# Patient Record
Sex: FEMALE | Race: WHITE | Employment: PART TIME | URBAN - NONMETROPOLITAN AREA
[De-identification: names, ages, dates, MRNs, and addresses within clinical notes are randomized per-mention and may not be internally consistent; named-entity substitution may affect disease eponyms.]

---

## 2017-01-24 DIAGNOSIS — J31.0 CHRONIC RHINITIS: ICD-10-CM

## 2017-01-24 DIAGNOSIS — J45.20 INTERMITTENT ASTHMA, UNCOMPLICATED: Primary | ICD-10-CM

## 2017-01-24 NOTE — TELEPHONE ENCOUNTER
Ventolin Inhaler       Last Written Prescription Date: 8/31/2016  Last Fill Quantity: 3 Inhaler, # refills: 0    Last Office Visit with G, P or [facility] prescribing provider:  10/26/2016   Future Office Visit:       Date of Last Asthma Action Plan Letter:   Asthma Action Plan Q1 Year    Topic Date Due     Asthma Action Plan - yearly  09/03/2016      Asthma Control Test:   ACT Total Scores 11/4/2015   ACT TOTAL SCORE -   ASTHMA ER VISITS -   ASTHMA HOSPITALIZATIONS -   ACT TOTAL SCORE (Goal Greater than or Equal to 20) 20   In the past 12 months, how many times did you visit the emergency room for your asthma without being admitted to the hospital? 0   In the past 12 months, how many times were you hospitalized overnight because of your asthma? 0       Date of Last Spirometry Test:   No results found for this or any previous visit.

## 2017-01-25 RX ORDER — ALBUTEROL SULFATE 90 UG/1
2 AEROSOL, METERED RESPIRATORY (INHALATION) EVERY 6 HOURS PRN
Qty: 3 INHALER | Refills: 3 | Status: SHIPPED | OUTPATIENT
Start: 2017-01-25 | End: 2017-09-13

## 2017-01-25 NOTE — TELEPHONE ENCOUNTER
Routing refill request to provider for review/approval because:  ACT last done on 11/4/2015    Thank you  Fabi DOUGLAS RN

## 2017-02-15 DIAGNOSIS — Z87.39 HISTORY OF GOUT: ICD-10-CM

## 2017-02-15 NOTE — TELEPHONE ENCOUNTER
colchicine (COLCRYS) 0.6 MG tablet       Last Written Prescription Date: 12/20/16  Last Fill Quantity: 30, # refills: 0  Last Office Visit with G, P or [facility] prescribing provider:  10/26/16        Uric Acid   Date Value Ref Range Status   12/29/2015 8.2 (H) 2.6 - 6.0 mg/dL Final   ]  Creatinine   Date Value Ref Range Status   11/04/2015 0.89 0.52 - 1.04 mg/dL Final   ]  Lab Results   Component Value Date    WBC 4.7 06/15/2016     Lab Results   Component Value Date    RBC 4.12 06/15/2016     Lab Results   Component Value Date    HGB 14.6 06/15/2016     Lab Results   Component Value Date    HCT 43.4 06/15/2016     No components found for: MCT  Lab Results   Component Value Date     06/15/2016     Lab Results   Component Value Date    MCH 35.4 06/15/2016     Lab Results   Component Value Date    MCHC 33.6 06/15/2016     Lab Results   Component Value Date    RDW 11.6 06/15/2016     Lab Results   Component Value Date     06/15/2016     Lab Results   Component Value Date    AST 30 12/08/2012     Lab Results   Component Value Date    ALT 36 12/08/2012

## 2017-02-16 DIAGNOSIS — J45.20 INTERMITTENT ASTHMA: Primary | ICD-10-CM

## 2017-02-16 RX ORDER — COLCHICINE 0.6 MG/1
TABLET ORAL
Qty: 30 TABLET | Refills: 0 | Status: SHIPPED
Start: 2017-02-16 | End: 2018-02-16

## 2017-02-16 RX ORDER — FLUTICASONE PROPIONATE 220 UG/1
2 AEROSOL, METERED RESPIRATORY (INHALATION) 2 TIMES DAILY
Qty: 1 INHALER | Refills: 1 | Status: SHIPPED | OUTPATIENT
Start: 2017-02-16 | End: 2018-02-02

## 2017-02-16 NOTE — TELEPHONE ENCOUNTER
Flovent       Last Written Prescription Date: 4/22/2016  Last Fill Quantity: 1, # refills: 1    Last Office Visit with G, [facility] or [facility] prescribing provider:  10/17/2016   Future Office Visit:       Date of Last Asthma Action Plan Letter:   Asthma Action Plan Q1 Year    Topic Date Due     Asthma Action Plan - yearly  09/03/2016      Asthma Control Test:   ACT Total Scores 11/4/2015   ACT TOTAL SCORE -   ASTHMA ER VISITS -   ASTHMA HOSPITALIZATIONS -   ACT TOTAL SCORE (Goal Greater than or Equal to 20) 20   In the past 12 months, how many times did you visit the emergency room for your asthma without being admitted to the hospital? 0   In the past 12 months, how many times were you hospitalized overnight because of your asthma? 0       Date of Last Spirometry Test:   No results found for this or any previous visit.

## 2017-03-24 DIAGNOSIS — J45.20 INTERMITTENT ASTHMA, UNCOMPLICATED: ICD-10-CM

## 2017-03-24 DIAGNOSIS — M1A.09X0 CHRONIC GOUT OF MULTIPLE SITES, UNSPECIFIED CAUSE: ICD-10-CM

## 2017-03-24 DIAGNOSIS — J31.0 CHRONIC RHINITIS: ICD-10-CM

## 2017-03-24 RX ORDER — MONTELUKAST SODIUM 10 MG/1
10 TABLET ORAL AT BEDTIME
Qty: 90 TABLET | Refills: 1 | Status: SHIPPED | OUTPATIENT
Start: 2017-03-24 | End: 2017-09-20

## 2017-03-24 RX ORDER — ALLOPURINOL 100 MG/1
100 TABLET ORAL DAILY
Qty: 90 TABLET | Refills: 0 | Status: SHIPPED | OUTPATIENT
Start: 2017-03-24 | End: 2017-06-02

## 2017-03-24 NOTE — TELEPHONE ENCOUNTER
Allopurinol 100mg  Last Written Prescription Date: 12.20.2016  Last Fill Quantity: 90, # refills: 0  Last Office Visit with [facility] [place], P or [facility] prescribing provider:  10.26.2016        Uric Acid   Date Value Ref Range Status   12/29/2015 8.2 (H) 2.6 - 6.0 mg/dL Final   ]  Creatinine   Date Value Ref Range Status   11/04/2015 0.89 0.52 - 1.04 mg/dL Final   ]  Lab Results   Component Value Date    WBC 4.7 06/15/2016     Lab Results   Component Value Date    RBC 4.12 06/15/2016     Lab Results   Component Value Date    HGB 14.6 06/15/2016     Lab Results   Component Value Date    HCT 43.4 06/15/2016     No components found for: MCT  Lab Results   Component Value Date     06/15/2016     Lab Results   Component Value Date    MCH 35.4 06/15/2016     Lab Results   Component Value Date    MCHC 33.6 06/15/2016     Lab Results   Component Value Date    RDW 11.6 06/15/2016     Lab Results   Component Value Date     06/15/2016     Lab Results   Component Value Date    AST 30 12/08/2012     Lab Results   Component Value Date    ALT 36 12/08/2012

## 2017-03-24 NOTE — TELEPHONE ENCOUNTER
Singulair 10mg  Last Written Prescription Date: 12.20.2016  Last Fill Quantity: 90,  # refills: 0   Last Office Visit with G, P or [facility] prescribing provider: 10.26.2016    Lisa Biggs CSS

## 2017-05-05 ENCOUNTER — OFFICE VISIT (OUTPATIENT)
Dept: FAMILY MEDICINE | Facility: CLINIC | Age: 62
End: 2017-05-05
Payer: COMMERCIAL

## 2017-05-05 VITALS
BODY MASS INDEX: 22.59 KG/M2 | SYSTOLIC BLOOD PRESSURE: 100 MMHG | TEMPERATURE: 98 F | DIASTOLIC BLOOD PRESSURE: 70 MMHG | RESPIRATION RATE: 20 BRPM | WEIGHT: 131.6 LBS | OXYGEN SATURATION: 98 % | HEART RATE: 79 BPM

## 2017-05-05 DIAGNOSIS — Z71.89 CARDIAC RISK COUNSELING: ICD-10-CM

## 2017-05-05 DIAGNOSIS — H69.92 ETD (EUSTACHIAN TUBE DYSFUNCTION), LEFT: Primary | ICD-10-CM

## 2017-05-05 DIAGNOSIS — F43.9 SITUATIONAL STRESS: ICD-10-CM

## 2017-05-05 DIAGNOSIS — Z71.6 ENCOUNTER FOR SMOKING CESSATION COUNSELING: ICD-10-CM

## 2017-05-05 PROCEDURE — 99214 OFFICE O/P EST MOD 30 MIN: CPT | Performed by: NURSE PRACTITIONER

## 2017-05-05 RX ORDER — NICOTINE 21 MG/24HR
1 PATCH, TRANSDERMAL 24 HOURS TRANSDERMAL EVERY 24 HOURS
Qty: 30 PATCH | Refills: 0 | Status: SHIPPED | OUTPATIENT
Start: 2017-05-05 | End: 2020-08-31

## 2017-05-05 RX ORDER — BUPROPION HYDROCHLORIDE 150 MG/1
TABLET, EXTENDED RELEASE ORAL
Qty: 60 TABLET | Refills: 2 | Status: SHIPPED | OUTPATIENT
Start: 2017-05-05 | End: 2017-11-03

## 2017-05-05 ASSESSMENT — PATIENT HEALTH QUESTIONNAIRE - PHQ9: 5. POOR APPETITE OR OVEREATING: NOT AT ALL

## 2017-05-05 ASSESSMENT — ANXIETY QUESTIONNAIRES
5. BEING SO RESTLESS THAT IT IS HARD TO SIT STILL: NOT AT ALL
7. FEELING AFRAID AS IF SOMETHING AWFUL MIGHT HAPPEN: SEVERAL DAYS
6. BECOMING EASILY ANNOYED OR IRRITABLE: NOT AT ALL
GAD7 TOTAL SCORE: 4
2. NOT BEING ABLE TO STOP OR CONTROL WORRYING: SEVERAL DAYS
IF YOU CHECKED OFF ANY PROBLEMS ON THIS QUESTIONNAIRE, HOW DIFFICULT HAVE THESE PROBLEMS MADE IT FOR YOU TO DO YOUR WORK, TAKE CARE OF THINGS AT HOME, OR GET ALONG WITH OTHER PEOPLE: SOMEWHAT DIFFICULT
1. FEELING NERVOUS, ANXIOUS, OR ON EDGE: SEVERAL DAYS
3. WORRYING TOO MUCH ABOUT DIFFERENT THINGS: SEVERAL DAYS

## 2017-05-05 NOTE — PROGRESS NOTES
SUBJECTIVE:                                                    Fabi Rizzo is a 62 year old female who presents to clinic today for the following health issues:      Ear Problem      Duration: Few weeks, worsening last night    Description (location/character/radiation): Left Ear    Intensity:  moderate    Accompanying signs and symptoms: Crackling    History (similar episodes/previous evaluation): yes has had impacted ears before, thinks its the same    Precipitating or alleviating factors: None    Therapies tried and outcome: peroxide        She also questions if she should be doing anything for screening for risk of heart attack  No chest pain  Some mild fatigue and shortness of breath    The 10-year ASCVD risk score (Ann Arborhernandez RODRÍGUEZ Jr, et al., 2013) is: 4.8%    Values used to calculate the score:      Age: 62 years      Sex: Female      Is Non- : No      Diabetic: No      Tobacco smoker: Yes      Systolic Blood Pressure: 100 mmHg      Is BP treated: Yes      HDL Cholesterol: 78 mg/dL      Total Cholesterol: 193 mg/dL    Discussed with her that smoking increase her risk significantly  She is very interested in quitting but not sure how  She has done chantix in the past but she did get severe nightmares from   Has never tried zyban   When mentioned that this also helps depression she states that it may help her mood  She has been struggling as her daughter had a fetal demise at 7 months   She is having a hard time with watching her daughter struggle with this   She denies thoughts of self harm   PHQ-9 SCORE 12/29/2015 1/29/2016 5/5/2017   Total Score - - -   Total Score 6 1 3     LACY-7 SCORE 12/29/2015 1/29/2016 5/5/2017   Total Score - - -   Total Score 5 1 4                 Problem list and histories reviewed & adjusted, as indicated.  Additional history: as documented    Patient Active Problem List   Diagnosis     CARDIOVASCULAR SCREENING; LDL GOAL LESS THAN 130     Benign essential  hypertension     Intermittent asthma     GERD (gastroesophageal reflux disease)     Major depressive disorder, recurrent episode, mild (HCC)     Advanced directives, counseling/discussion     Alcohol use     Chronic low back pain     Spondylolisthesis of lumbar region     Lumbar foraminal stenosis     Chronic rhinitis     Chronic gout of multiple sites, unspecified cause     Tobacco use disorder     Past Surgical History:   Procedure Laterality Date     LAMINECTOMY LUMBAR POSTERIOR MICROSCOPIC ONE LEVEL N/A 11/17/2015    Procedure: LAMINECTOMY LUMBAR POSTERIOR MICROSCOPIC ONE LEVEL;  Surgeon: Stephen Vuong MD;  Location: [facility] OR     SURGICAL HISTORY OF -   09/30/2009    OPEN REDUCTION, INTERNAL FIXATINO OF RIGHT ANKLE (MEDIAL AND LATERL MALLEOLI)       Social History   Substance Use Topics     Smoking status: Current Every Day Smoker     Packs/day: 0.50     Years: 30.00     Types: Cigarettes     Smokeless tobacco: Never Used     Alcohol use Yes     Family History   Problem Relation Age of Onset     C.A.D. Mother      C.A.D. Father      DIABETES Brother      Hypertension Brother      C.A.D. Brother            Reviewed and updated as needed this visit by clinical staff  Tobacco  Allergies  Med Hx  Surg Hx  Fam Hx  Soc Hx      Reviewed and updated as needed this visit by Provider         ROS:  Constitutional, HEENT, cardiovascular, pulmonary, gi and gu systems are negative, except as otherwise noted.    OBJECTIVE:                                                    /70 (BP Location: Right arm, Patient Position: Chair, Cuff Size: Adult Regular)  Pulse 79  Temp 98  F (36.7  C) (Tympanic)  Resp 20  Wt 131 lb 9.6 oz (59.7 kg)  SpO2 98%  BMI 22.59 kg/m2  Body mass index is 22.59 kg/(m^2).  GENERAL APPEARANCE: healthy, alert and no distress  HENT: ear canals and TM's normal and nose and mouth without ulcers or lesions  RESP: lungs clear to auscultation - no rales, rhonchi or wheezes  CV: regular rates  and rhythm, normal S1 S2, no S3 or S4 and no murmur, click or rub  PSYCH: crying    Diagnostic test results:  Diagnostic Test Results:  none      ASSESSMENT/PLAN:                                                          Patient Instructions      try OVER THE COUNTER antihistamine claritin, zyrtec or allegra  flonase spray  If not better or worse ENT     1. ETD (eustachian tube dysfunction), left  - OTOLARYNGOLOGY REFERRAL    2. Encounter for smoking cessation counseling  Will start zyban and nicotine patch     - buPROPion (WELLBUTRIN SR) 150 MG 12 hr tablet; Take 1 tablet once daily and increase to 1 tablet twice daily after 4 to 7 days  Dispense: 60 tablet; Refill: 2  - nicotine (NICODERM CQ) 14 MG/24HR 24 hr patch; Place 1 patch onto the skin every 24 hours  Dispense: 30 patch; Refill: 0    3. Cardiac risk counseling  Reviewed cardiac risk factors  Recommend smoking cessation   Diet and exercise     4. Situational stress  Emotional support provided   Wellbutrin may be helpful   Fluid in the Middle Ear, No Infection (Adult)  Earaches can happen without an infection. This occurs when air and fluid build up behind the eardrum causing a feeling of fullness and discomfort and reduced hearing. This is called otitis media with effusion (OME) or serous otitis media. It means there is fluid in the middle ear. It is not the same as acute otitis media, which is typically from infection.  OME can happen when you have a cold if congestion blocks the passage that drains the middle ear (eustachian tube). It may also occur with nasal allergies or after a bacterial middle ear infection.    The pain/discomfort may come and go. You may hear clicking or popping sounds when you chew or swallow. You may feel that your balance is off. Or you may hear ringing in the ear.  It often takes from several weeks up to 3 months for the fluid to clear on its own. Oral pain relievers and ear drops help if there is pain. Decongestants and  antihistamines sometimes help. Antibiotics don't help since there is no infection. Your doctor may prescribe a nasal spray to help reduce swelling in the nose and eustachian tube. This can allow the ear to drain.  If it doesn't improve after 3 months, surgery may be used to drain the fluid and insert a small tube in the eardrum to allow continued drainage.  Because the middle ear fluid can become infected, it is important to watch for signs of an ear infection which may develop later. These signs include increased ear pain, fever, or drainage from the ear.  Home care  The following guidelines will help you care for yourself at home:    You may use acetaminophen or ibuprofen to control pain, unless another medicine was prescribed. [NOTE: If you have chronic liver or kidney disease or ever had a stomach ulcer or GI bleeding, talk with your doctor before using these medicines.] (Aspirin should never be used in anyone under 18 years of age who is ill with a fever. It may cause severe liver damage.)    While not always helpful, you may use over-the-counter decongestants such as phenylephrine or pseudoephedrine. Don't use nasal spray decongestants more than 3 days. Longer use can make congestion worse. (Prescription nasal sprays from your doctor don't typically have those restrictions.)    Antihistamines may help if you are also having allergy symptoms.    You may use medicines such as guaifenesin to thin mucus and promote drainage.  Follow-up care  Follow up with your doctor or as advised if you are not feeling better after 3 days.  When to seek medical care  Get prompt medical attention if any of the following occur:    Ear pain gets worse or does not start to improve     Fever of 100.4 F (38 C) or higher, or as directed by your health care provider    Fluid or blood draining from the ear    Headache or sinus pain    Stiff neck    Unusual drowsiness or confusion    8111-2711 [employer]. [address], [place] [address]. All rights reserved. This information is not intended as a substitute for professional medical care. Always follow your healthcare professional's instructions.          How to Quit Smoking  Smoking is one of the hardest habits to break. About half of all people who have ever smoked have been able to quit. Most people who still smoke want to quit. Here are some of the best ways to stop smoking.    Keep trying  It takes most smokers about eight tries before they can quit entirely. It s important not to give up.  Go cold turkey  Most former smokers quit cold turkey (all at once). Trying to cut back gradually doesn't seem to work as well, perhaps because it continues the smoking habit. Also, it is possible to inhale more while smoking fewer cigarettes. This results in the same amount of nicotine in your body!  Get support  Support programs can be a big help, especially for heavy smokers. These groups offer lectures, ways to change behavior, and peer support. Here are some ways to find a support program:    Free national quitline: 014-QUIT-NOW (230-553-3231).    [facility] quit-smoking programs.    American Lung Association: (532.769.3739).    American Cancer Society (111-177-6558).  Support at home is important too. Nonsmokers can offer praise and encouragement. If the smoker in your life finds it hard to quit, encourage them to keep trying!  Over-the-counter medicines  Nicotine replacement therapy may make quitting easier. Certain aids, such as the nicotine patch, gum, and lozenges, are available without a prescription. It is best to use these under a doctor s care, though. The skin patch provides a steady supply of nicotine. Nicotine gum and lozenges give temporary bursts of low levels of nicotine. Both methods reduce the craving for cigarettes. Warning: If you have nausea, vomiting, dizziness, weakness, or a fast heartbeat, stop using these products and see your doctor.  Prescription  medicines  After reviewing your smoking patterns and prior attempts to quit, your doctor may offer a prescription medicine such as bupropion, varenicline, a nicotine inhaler, or nasal spray. Each has advantages and side effects. Your doctor can review these with you.  Health benefits of quitting  The benefits of quitting start right away and keep improving the longer you go without smoking. These benefits occur at any age.  So whether you are 17 or 70, quitting is a good decision. Some of the benefits include:    20 minutes: Blood pressure and pulse return to normal.    8 hours: Oxygen levels return to normal.    2 days: Ability to smell and taste begin to improve as damaged nerves regrow.    2 to 3 weeks: Circulation and lung function improve.    1 to 9 months: Coughing, congestion, and shortness of breath decrease; tiredness decreases.    1 year: Risk of heart attack decreases by half.    5 years: Risk of lung cancer decreases by half; risk of stroke becomes the same as a nonsmoker s.  For more on how to quit smoking, try these online resources:     Smokefree.gov http://smokefree.gov/    Clearing the Air  booklet from the National Cancer [place] http://smokefree.gov/sites/default/files/pdf/clearing-the-air-accessible.pdf    3083-6203 [employer]. [address] [place] [address], [place] [id number]. All rights reserved. This information is not intended as a substitute for professional medical care. Always follow your healthcare professional's instructions.            Adrianna Watson NP  [facility]

## 2017-05-05 NOTE — PATIENT INSTRUCTIONS
try OVER THE COUNTER antihistamine claritin, zyrtec or allegra  flonase spray  If not better or worse ENT     1. ETD (eustachian tube dysfunction), left  - OTOLARYNGOLOGY REFERRAL    2. Encounter for smoking cessation counseling  Will start zyban and nicotine patch     - buPROPion (WELLBUTRIN SR) 150 MG 12 hr tablet; Take 1 tablet once daily and increase to 1 tablet twice daily after 4 to 7 days  Dispense: 60 tablet; Refill: 2  - nicotine (NICODERM CQ) 14 MG/24HR 24 hr patch; Place 1 patch onto the skin every 24 hours  Dispense: 30 patch; Refill: 0  Fluid in the Middle Ear, No Infection (Adult)  Earaches can happen without an infection. This occurs when air and fluid build up behind the eardrum causing a feeling of fullness and discomfort and reduced hearing. This is called otitis media with effusion (OME) or serous otitis media. It means there is fluid in the middle ear. It is not the same as acute otitis media, which is typically from infection.  OME can happen when you have a cold if congestion blocks the passage that drains the middle ear (eustachian tube). It may also occur with nasal allergies or after a bacterial middle ear infection.    The pain/discomfort may come and go. You may hear clicking or popping sounds when you chew or swallow. You may feel that your balance is off. Or you may hear ringing in the ear.  It often takes from several weeks up to 3 months for the fluid to clear on its own. Oral pain relievers and ear drops help if there is pain. Decongestants and antihistamines sometimes help. Antibiotics don't help since there is no infection. Your doctor may prescribe a nasal spray to help reduce swelling in the nose and eustachian tube. This can allow the ear to drain.  If it doesn't improve after 3 months, surgery may be used to drain the fluid and insert a small tube in the eardrum to allow continued drainage.  Because the middle ear fluid can become infected, it is important to watch for signs  of an ear infection which may develop later. These signs include increased ear pain, fever, or drainage from the ear.  Home care  The following guidelines will help you care for yourself at home:    You may use acetaminophen or ibuprofen to control pain, unless another medicine was prescribed. [NOTE: If you have chronic liver or kidney disease or ever had a stomach ulcer or GI bleeding, talk with your doctor before using these medicines.] (Aspirin should never be used in anyone under 18 years of age who is ill with a fever. It may cause severe liver damage.)    While not always helpful, you may use over-the-counter decongestants such as phenylephrine or pseudoephedrine. Don't use nasal spray decongestants more than 3 days. Longer use can make congestion worse. (Prescription nasal sprays from your doctor don't typically have those restrictions.)    Antihistamines may help if you are also having allergy symptoms.    You may use medicines such as guaifenesin to thin mucus and promote drainage.  Follow-up care  Follow up with your doctor or as advised if you are not feeling better after 3 days.  When to seek medical care  Get prompt medical attention if any of the following occur:    Ear pain gets worse or does not start to improve     Fever of 100.4 F (38 C) or higher, or as directed by your health care provider    Fluid or blood draining from the ear    Headache or sinus pain    Stiff neck    Unusual drowsiness or confusion    7111-5283 [employer]. [address] [place] [address]. All rights reserved. This information is not intended as a substitute for professional medical care. Always follow your healthcare professional's instructions.          How to Quit Smoking  Smoking is one of the hardest habits to break. About half of all people who have ever smoked have been able to quit. Most people who still smoke want to quit. Here are some of the best ways to stop smoking.    Keep trying  It  takes most smokers about eight tries before they can quit entirely. It s important not to give up.  Go cold turkey  Most former smokers quit cold turkey (all at once). Trying to cut back gradually doesn't seem to work as well, perhaps because it continues the smoking habit. Also, it is possible to inhale more while smoking fewer cigarettes. This results in the same amount of nicotine in your body!  Get support  Support programs can be a big help, especially for heavy smokers. These groups offer lectures, ways to change behavior, and peer support. Here are some ways to find a support program:    Free national quitline: 800-QUIT-NOW (621-211-8955).    Hospital quit-smoking programs.    American Lung Association: (773.964.2054).    American Cancer Society (263-321-0875).  Support at home is important too. Nonsmokers can offer praise and encouragement. If the smoker in your life finds it hard to quit, encourage them to keep trying!  Over-the-counter medicines  Nicotine replacement therapy may make quitting easier. Certain aids, such as the nicotine patch, gum, and lozenges, are available without a prescription. It is best to use these under a doctor s care, though. The skin patch provides a steady supply of nicotine. Nicotine gum and lozenges give temporary bursts of low levels of nicotine. Both methods reduce the craving for cigarettes. Warning: If you have nausea, vomiting, dizziness, weakness, or a fast heartbeat, stop using these products and see your doctor.  Prescription medicines  After reviewing your smoking patterns and prior attempts to quit, your doctor may offer a prescription medicine such as bupropion, varenicline, a nicotine inhaler, or nasal spray. Each has advantages and side effects. Your doctor can review these with you.  Health benefits of quitting  The benefits of quitting start right away and keep improving the longer you go without smoking. These benefits occur at any age.  So whether you are 17  or 70, quitting is a good decision. Some of the benefits include:    20 minutes: Blood pressure and pulse return to normal.    8 hours: Oxygen levels return to normal.    2 days: Ability to smell and taste begin to improve as damaged nerves regrow.    2 to 3 weeks: Circulation and lung function improve.    1 to 9 months: Coughing, congestion, and shortness of breath decrease; tiredness decreases.    1 year: Risk of heart attack decreases by half.    5 years: Risk of lung cancer decreases by half; risk of stroke becomes the same as a nonsmoker s.  For more on how to quit smoking, try these online resources:     Smokefree.gov http://smokefree.gov/    Clearing the Air  booklet from the National Cancer [place] http://smokefree.gov/sites/default/files/pdf/clearing-the-air-accessible.pdf    0663-8943 [employer]. [address] [place] [address], [place] [address]. All rights reserved. This information is not intended as a substitute for professional medical care. Always follow your healthcare professional's instructions.

## 2017-05-05 NOTE — NURSING NOTE
"Chief Complaint   Patient presents with     Ear Problem       Initial /70 (BP Location: Right arm, Patient Position: Chair, Cuff Size: Adult Regular)  Pulse 79  Temp 98  F (36.7  C) (Tympanic)  Resp 20  Wt 131 lb 9.6 oz (59.7 kg)  SpO2 98%  BMI 22.59 kg/m2 Estimated body mass index is 22.59 kg/(m^2) as calculated from the following:    Height as of 10/26/16: 5' 4\" (1.626 m).    Weight as of this encounter: 131 lb 9.6 oz (59.7 kg).  Medication Reconciliation: complete    Health Maintenance that is potentially due pending provider review:  Due for physical    Is aware, will make appointment for later time.      "

## 2017-05-05 NOTE — MR AVS SNAPSHOT
After Visit Summary   5/5/2017    Fabi Rizzo    MRN: [id number]           Patient Information     Date Of Birth          1955        Visit Information        Provider Department      5/5/2017 2:40 PM Adrianna Watson NP [facility]        Today's Diagnoses     ETD (eustachian tube dysfunction), left    -  1    Encounter for smoking cessation counseling          Care Instructions       try OVER THE COUNTER antihistamine claritin, zyrtec or allegra  flonase spray  If not better or worse ENT     1. ETD (eustachian tube dysfunction), left  - OTOLARYNGOLOGY REFERRAL    2. Encounter for smoking cessation counseling  Will start zyban and nicotine patch     - buPROPion (WELLBUTRIN SR) 150 MG 12 hr tablet; Take 1 tablet once daily and increase to 1 tablet twice daily after 4 to 7 days  Dispense: 60 tablet; Refill: 2  - nicotine (NICODERM CQ) 14 MG/24HR 24 hr patch; Place 1 patch onto the skin every 24 hours  Dispense: 30 patch; Refill: 0  Fluid in the Middle Ear, No Infection (Adult)  Earaches can happen without an infection. This occurs when air and fluid build up behind the eardrum causing a feeling of fullness and discomfort and reduced hearing. This is called otitis media with effusion (OME) or serous otitis media. It means there is fluid in the middle ear. It is not the same as acute otitis media, which is typically from infection.  OME can happen when you have a cold if congestion blocks the passage that drains the middle ear (eustachian tube). It may also occur with nasal allergies or after a bacterial middle ear infection.    The pain/discomfort may come and go. You may hear clicking or popping sounds when you chew or swallow. You may feel that your balance is off. Or you may hear ringing in the ear.  It often takes from several weeks up to 3 months for the fluid to clear on its own. Oral pain relievers and ear drops help if there is pain. Decongestants and antihistamines  sometimes help. Antibiotics don't help since there is no infection. Your doctor may prescribe a nasal spray to help reduce swelling in the nose and eustachian tube. This can allow the ear to drain.  If it doesn't improve after 3 months, surgery may be used to drain the fluid and insert a small tube in the eardrum to allow continued drainage.  Because the middle ear fluid can become infected, it is important to watch for signs of an ear infection which may develop later. These signs include increased ear pain, fever, or drainage from the ear.  Home care  The following guidelines will help you care for yourself at home:    You may use acetaminophen or ibuprofen to control pain, unless another medicine was prescribed. [NOTE: If you have chronic liver or kidney disease or ever had a stomach ulcer or GI bleeding, talk with your doctor before using these medicines.] (Aspirin should never be used in anyone under 18 years of age who is ill with a fever. It may cause severe liver damage.)    While not always helpful, you may use over-the-counter decongestants such as phenylephrine or pseudoephedrine. Don't use nasal spray decongestants more than 3 days. Longer use can make congestion worse. (Prescription nasal sprays from your doctor don't typically have those restrictions.)    Antihistamines may help if you are also having allergy symptoms.    You may use medicines such as guaifenesin to thin mucus and promote drainage.  Follow-up care  Follow up with your doctor or as advised if you are not feeling better after 3 days.  When to seek medical care  Get prompt medical attention if any of the following occur:    Ear pain gets worse or does not start to improve     Fever of 100.4 F (38 C) or higher, or as directed by your health care provider    Fluid or blood draining from the ear    Headache or sinus pain    Stiff neck    Unusual drowsiness or confusion    2360-7432 [employer]. [address] [place] [address], [place] [address]. All rights reserved. This information is not intended as a substitute for professional medical care. Always follow your healthcare professional's instructions.          How to Quit Smoking  Smoking is one of the hardest habits to break. About half of all people who have ever smoked have been able to quit. Most people who still smoke want to quit. Here are some of the best ways to stop smoking.    Keep trying  It takes most smokers about eight tries before they can quit entirely. It s important not to give up.  Go cold turkey  Most former smokers quit cold turkey (all at once). Trying to cut back gradually doesn't seem to work as well, perhaps because it continues the smoking habit. Also, it is possible to inhale more while smoking fewer cigarettes. This results in the same amount of nicotine in your body!  Get support  Support programs can be a big help, especially for heavy smokers. These groups offer lectures, ways to change behavior, and peer support. Here are some ways to find a support program:    Free national quitline: 800HealthHiwayQUIT-NOW (699-527-0997).    [facility] quit-smoking programs.    American Lung Association: (359.146.9911).    American Cancer Society (451-815-0862).  Support at home is important too. Nonsmokers can offer praise and encouragement. If the smoker in your life finds it hard to quit, encourage them to keep trying!  Over-the-counter medicines  Nicotine replacement therapy may make quitting easier. Certain aids, such as the nicotine patch, gum, and lozenges, are available without a prescription. It is best to use these under a doctor s care, though. The skin patch provides a steady supply of nicotine. Nicotine gum and lozenges give temporary bursts of low levels of nicotine. Both methods reduce the craving for cigarettes. Warning: If you have nausea, vomiting, dizziness, weakness, or a fast heartbeat, stop using these products and see your doctor.  Prescription medicines  After  reviewing your smoking patterns and prior attempts to quit, your doctor may offer a prescription medicine such as bupropion, varenicline, a nicotine inhaler, or nasal spray. Each has advantages and side effects. Your doctor can review these with you.  Health benefits of quitting  The benefits of quitting start right away and keep improving the longer you go without smoking. These benefits occur at any age.  So whether you are 17 or 70, quitting is a good decision. Some of the benefits include:    20 minutes: Blood pressure and pulse return to normal.    8 hours: Oxygen levels return to normal.    2 days: Ability to smell and taste begin to improve as damaged nerves regrow.    2 to 3 weeks: Circulation and lung function improve.    1 to 9 months: Coughing, congestion, and shortness of breath decrease; tiredness decreases.    1 year: Risk of heart attack decreases by half.    5 years: Risk of lung cancer decreases by half; risk of stroke becomes the same as a nonsmoker s.  For more on how to quit smoking, try these online resources:     Smokefree.gov http://smokefree.gov/    Clearing the Air  booklet from the National Cancer [place] http://smokefree.gov/sites/default/files/pdf/clearing-the-air-accessible.pdf    9441-3959 [employer]. [address] [place] [address], [place] [address]. All rights reserved. This information is not intended as a substitute for professional medical care. Always follow your healthcare professional's instructions.              Follow-ups after your visit        Additional Services     OTOLARYNGOLOGY REFERRAL       Your provider has referred you to: [facility]: [facility] [phone]   [email or web address]    Please be aware that coverage of these services is subject to the terms and limitations of your health insurance plan.  Call member services at your health plan with any benefit or coverage questions.      Please bring the following with you  to your appointment:    (1) Any X-Rays, CTs or MRIs which have been performed.  Contact the facility where they were done to arrange for  prior to your scheduled appointment.   (2) List of current medications  (3) This referral request   (4) Any documents/labs given to you for this referral                  Who to contact     If you have questions or need follow up information about today's clinic visit or your schedule please contact [facility] directly at [phone].  Normal or non-critical lab and imaging results will be communicated to you by [employer], letter or phone within 4 business days after the clinic has received the results. If you do not hear from us within 7 days, please contact the clinic through [employer] or phone. If you have a critical or abnormal lab result, we will notify you by phone as soon as possible.  Submit refill requests through [employer] or call your pharmacy and they will forward the refill request to us. Please allow 3 business days for your refill to be completed.          Additional Information About Your Visit        [employer] Information     [employer] gives you secure access to your electronic health record. If you see a primary care provider, you can also send messages to your care team and make appointments. If you have questions, please call your primary care clinic.  If you do not have a primary care provider, please call [phone] and they will assist you.        Care EveryWhere ID     This is your Care EveryWhere ID. This could be used by other organizations to access your [facility] medical records  [id number]        Your Vitals Were     Pulse Temperature Respirations Pulse Oximetry BMI (Body Mass Index)       79 98  F (36.7  C) (Tympanic) 20 98% 22.59 kg/m2        Blood Pressure from Last 3 Encounters:   05/05/17 100/70   10/26/16 98/64   10/17/16 98/52    Weight from Last 3 Encounters:   05/05/17 131 lb 9.6 oz (59.7 kg)   10/26/16 130 lb (59 kg)    10/17/16 131 lb 3.2 oz (59.5 kg)              We Performed the Following     OTOLARYNGOLOGY REFERRAL          Today's Medication Changes          These changes are accurate as of: 5/5/17  3:14 PM.  If you have any questions, ask your nurse or doctor.               Start taking these medicines.        Dose/Directions    buPROPion 150 MG 12 hr tablet   Commonly known as:  WELLBUTRIN SR   Used for:  Encounter for smoking cessation counseling   Started by:  Adrianna Watson NP        Take 1 tablet once daily and increase to 1 tablet twice daily after 4 to 7 days   Quantity:  60 tablet   Refills:  2       nicotine 14 MG/24HR 24 hr patch   Commonly known as:  NICODERM CQ   Used for:  Encounter for smoking cessation counseling   Started by:  Adrianna Watson NP        Dose:  1 patch   Place 1 patch onto the skin every 24 hours   Quantity:  30 patch   Refills:  0            Where to get your medicines      These medications were sent to [facility] [address]     Phone:  [phone]     buPROPion 150 MG 12 hr tablet    nicotine 14 MG/24HR 24 hr patch                Primary Care Provider Office Phone # Fax #    Vince Vasquez [phone]       [facility] [address] [facility] [address]        Thank you!     Thank you for choosing [facility]  for your care. Our goal is always to provide you with excellent care. Hearing back from our patients is one way we can continue to improve our services. Please take a few minutes to complete the written survey that you may receive in the mail after your visit with us. Thank you!             Your Updated Medication List - Protect others around you: Learn how to safely use, store and throw away your medicines at www.disposemymeds.org.          This list is accurate as of: 5/5/17  3:14 PM.  Always use your most recent med  list.                   Brand Name Dispense Instructions for use    * albuterol (2.5 MG/3ML) 0.083% neb solution     60 vial    Take 3 mLs (2.5 mg) by nebulization every 6 hours as needed for shortness of breath / dyspnea       * albuterol 108 (90 BASE) MCG/ACT Inhaler    PROAIR HFA/PROVENTIL HFA/VENTOLIN HFA    3 Inhaler    Inhale 2 puffs into the lungs every 6 hours as needed for shortness of breath / dyspnea       allopurinol 100 MG tablet    ZYLOPRIM    90 tablet    Take 1 tablet (100 mg) by mouth daily       buPROPion 150 MG 12 hr tablet    WELLBUTRIN SR    60 tablet    Take 1 tablet once daily and increase to 1 tablet twice daily after 4 to 7 days       calcium 600 MG tablet     60 Tab    1 TABLET TWICE DAILY WITH FOOD       colchicine 0.6 MG tablet    COLCRYS    30 tablet    Take 2 tablets at the first sign of flare, take 1 additional tablet one hour later. Then take 1 tablet twice daily until your gout flare is gone       fish oil-omega-3 fatty acids 1000 MG capsule     120 Cap    1 CAPSULE DAILY       fluticasone 220 MCG/ACT Inhaler    FLOVENT HFA    1 Inhaler    Inhale 2 puffs into the lungs 2 times daily       lisinopril 5 MG tablet    PRINIVIL/ZESTRIL    90 tablet    Take 1 tablet (5 mg) by mouth daily ONE DAILY       metoprolol 50 MG tablet    LOPRESSOR    90 tablet    Take 1 tablet (50 mg) by mouth daily       montelukast 10 MG tablet    SINGULAIR    90 tablet    Take 1 tablet (10 mg) by mouth At Bedtime       multivitamin per tablet     100 Tab    ONE DAILY       nicotine 14 MG/24HR 24 hr patch    NICODERM CQ    30 patch    Place 1 patch onto the skin every 24 hours       order for DME     1 Device    Equipment being ordered: Nebulizer       vitamin B complex with vitamin C Tabs tablet      Take 1 tablet by mouth 2 times daily.       vitamin D3 1000 UNITS Caps     30 Cap    1 CAPSULE DAILY       * Notice:  This list has 2 medication(s) that are the same as other medications prescribed for you. Read  the directions carefully, and ask your doctor or other care provider to review them with you.

## 2017-05-06 ASSESSMENT — PATIENT HEALTH QUESTIONNAIRE - PHQ9: SUM OF ALL RESPONSES TO PHQ QUESTIONS 1-9: 3

## 2017-05-06 ASSESSMENT — ASTHMA QUESTIONNAIRES: ACT_TOTALSCORE: 18

## 2017-05-06 ASSESSMENT — ANXIETY QUESTIONNAIRES: GAD7 TOTAL SCORE: 4

## 2017-08-12 ENCOUNTER — TELEPHONE (OUTPATIENT)
Dept: FAMILY MEDICINE | Facility: CLINIC | Age: 62
End: 2017-08-12

## 2017-08-12 NOTE — TELEPHONE ENCOUNTER
PHS (Colon/Mammo) - Declined; Patient is aware of screenings and is wanting to schedule both screenings but is unable to schedule at this time. Patient will call back to schedule screenings.    Outreach ,  Lissette Minaya

## 2017-09-20 DIAGNOSIS — J31.0 CHRONIC RHINITIS: ICD-10-CM

## 2017-09-20 DIAGNOSIS — J45.20 INTERMITTENT ASTHMA, UNCOMPLICATED: ICD-10-CM

## 2017-09-20 RX ORDER — MONTELUKAST SODIUM 10 MG/1
TABLET ORAL
Qty: 90 TABLET | Refills: 1 | Status: SHIPPED | OUTPATIENT
Start: 2017-09-20 | End: 2018-02-16

## 2017-10-17 ENCOUNTER — OFFICE VISIT (OUTPATIENT)
Dept: FAMILY MEDICINE | Facility: CLINIC | Age: 62
End: 2017-10-17
Payer: COMMERCIAL

## 2017-10-17 VITALS
DIASTOLIC BLOOD PRESSURE: 68 MMHG | BODY MASS INDEX: 23.52 KG/M2 | RESPIRATION RATE: 20 BRPM | WEIGHT: 137 LBS | SYSTOLIC BLOOD PRESSURE: 104 MMHG | OXYGEN SATURATION: 98 % | HEART RATE: 72 BPM | TEMPERATURE: 98.1 F

## 2017-10-17 DIAGNOSIS — Z12.11 SPECIAL SCREENING FOR MALIGNANT NEOPLASMS, COLON: ICD-10-CM

## 2017-10-17 DIAGNOSIS — M70.51 BURSITIS OF RIGHT KNEE, UNSPECIFIED BURSA: Primary | ICD-10-CM

## 2017-10-17 DIAGNOSIS — I83.93 VARICOSE VEINS OF BOTH LOWER EXTREMITIES: ICD-10-CM

## 2017-10-17 DIAGNOSIS — I83.891 VARICOSE VEINS OF LEG WITH EDEMA, RIGHT: ICD-10-CM

## 2017-10-17 PROCEDURE — 99214 OFFICE O/P EST MOD 30 MIN: CPT | Performed by: NURSE PRACTITIONER

## 2017-10-17 NOTE — MR AVS SNAPSHOT
After Visit Summary   10/17/2017    Fabi Rizzo    MRN: [id number]           Patient Information     Date Of Birth          1955        Visit Information        Provider Department      10/17/2017 3:00 PM Terri Hill, CNP [facility]        Today's Diagnoses     Bursitis of right knee, unspecified bursa    -  1    Varicose veins of both lower extremities        Varicose veins of leg with edema, right        Special screening for malignant neoplasms, colon          Care Instructions    Schedule Ultrasound in [place]    Follow instructions below for bursitis    Schedule Mammogram    Complete FIT TEST at home and mail in      Bursitis  You have bursitis. This is an inflammation of the bursa. These are small, fluid-filled sacs that surround the larger joints of the body. The bursa help the muscles and tendons move smoothly over the joints.  Bursitis often happens in the shoulder. But it can also affect the elbows, hips, pelvis, knees, toes, and heels. Bursitis can be caused by injury, overuse of the joint, or infection of the bursa. Symptoms include pain and tenderness over a joint. Symptoms get worse with movement.  Bursitis is treated with an anti-inflammatory medicine and by resting the joint. More severe cases require injection of medicine directly into the bursa.    Home care    Rest the painful joint and protect it from movement. This will allow the inflammation to heal faster.    Apply an ice pack over the injured area for no more than 15 to 20 minutes. Do this every 3 to 6 hours for the first 24 to 48 hours. Keep using ice packs 3 to 4 times a day until the pain and swelling improves.     To make an ice pack, put ice cubes in a sealed plastic zip-lock bag. Wrap the bag in a clean, thin towel or cloth. Never put ice or an ice pack directly on the skin. As the ice melts, be careful to avoid getting any wrap or splint wet.    You may take over-the-counter pain  medicine to treat pain and inflammation, unless another medicine was prescribed. Anti-inflammatory pain medicines may be more effective. Talk with your provider beforeusing these medicines if you have chronic liver or kidney disease, or ever had a stomach ulcer or GI (gastrointestinal) bleeding.    As your symptoms improve, slowly begin to move the joint. Do not overuse the joint. This may cause the symptoms to flare up again.  When to seek medical advice  Call your healthcare provider right away if any of these occur:    Redness over the painful area    Increasing pain or swelling at the joint    Fever of 100.4 F (38 C) or above lasting for 24 to 48 hours  Date Last Reviewed: 11/21/2015 2000-2017 [employer]. [address] [place] [address], [place] [address]. All rights reserved. This information is not intended as a substitute for professional medical care. Always follow your healthcare professional's instructions.        Self-Care for Spider and Varicose Veins  Your healthcare provider may suggest that you try self-care. Exercising and maintaining a healthy weight may keep problem veins from getting worse. Wearing elastic stockings and elevating your legs can help improve blood flow. Taking breaks when you sit or stand helps, too.     The top of the elastic stocking should be below the bend in your knee for a proper fit.   Exercising  Exercising is good for your veins because it improves blood flow. Walking, cycling, or swimming are great exercises for vein health. But be sure to check with your healthcare provider before starting any exercise program. Also, keep these hints in mind:    When exercising, start out slowly and try to build up to 30 minutes on most days.    Elevate your legs above heart level after exercise to keep blood from pooling in veins.  Maintaining a healthy weight  Being overweight puts extra pressure on your veins. To maintain a healthy weight, try these tips:    Choose lean meats,  fish, and skinless chicken.    Use low-fat dairy products.    Eat foods high in fiber, such as whole grains, fruits, and vegetables.    Cut down on sugar, salt, and saturated and trans fats.    Exercise regularly.  Wearing elastic stockings  Elastic stockings gently squeeze veins so blood flows upward. If you need elastic stockings, your healthcare provider can prescribe them for you. Follow your healthcare provider s advice about how and when to wear them. Elastic stockings come in several different levels of pressure. Ask your healthcare provider which level of pressure would benefit you the most.   Elevating your legs  Raising your legs above heart level will help relieve swelling and keep blood from pooling in veins. Try to elevate your legs for 15 to 20 minutes at the end of the day, and whenever you re relaxing. To make sure your legs are raised above heart level, prop them up on cushions or large pillows.  When sitting and standing  To keep blood moving when you have to sit or stand for long periods, try these tips:    At work, take walking breaks instead of coffee breaks. Walk during your lunch hour. Or try flexing your feet up and down 10 times each hour.    When standing, raise yourself up and down on your toes, or rock back and forth on your heels.  Date Last Reviewed: 5/1/2016 2000-2017 [employer] [address] [place] [address], [place] [address]. All rights reserved. This information is not intended as a substitute for professional medical care. Always follow your healthcare professional's instructions.                Follow-ups after your visit        Future tests that were ordered for you today     Open Future Orders        Priority Expected Expires Ordered    US Lower Extremity Venous Duplex Bilateral Routine  10/17/2018 10/17/2017    Fecal colorectal cancer screen (FIT) Routine 11/7/2017 1/9/2018 10/17/2017            Who to contact     If you have questions or need follow up information  about today's clinic visit or your schedule please contact [facility] directly at [phone].  Normal or non-critical lab and imaging results will be communicated to you by MyChart, letter or phone within 4 business days after the clinic has received the results. If you do not hear from us within 7 days, please contact the clinic through [employer] or phone. If you have a critical or abnormal lab result, we will notify you by phone as soon as possible.  Submit refill requests through [employer] or call your pharmacy and they will forward the refill request to us. Please allow 3 business days for your refill to be completed.          Additional Information About Your Visit        [employer] Information     [employer] gives you secure access to your electronic health record. If you see a primary care provider, you can also send messages to your care team and make appointments. If you have questions, please call your primary care clinic.  If you do not have a primary care provider, please call [phone] and they will assist you.        Care EveryWhere ID     This is your Care EveryWhere ID. This could be used by other organizations to access your [place] medical records  [id number]        Your Vitals Were     Pulse Temperature Respirations Pulse Oximetry BMI (Body Mass Index)       72 98.1  F (36.7  C) (Tympanic) 20 98% 23.52 kg/m2        Blood Pressure from Last 3 Encounters:   10/17/17 104/68   05/05/17 100/70   10/26/16 98/64    Weight from Last 3 Encounters:   10/17/17 137 lb (62.1 kg)   05/05/17 131 lb 9.6 oz (59.7 kg)   10/26/16 130 lb (59 kg)              We Performed the Following     Asthma Action Plan (AAP)     DEPRESSION ACTION PLAN (DAP)          Today's Medication Changes          These changes are accurate as of: 10/17/17  3:44 PM.  If you have any questions, ask your nurse or doctor.               Start taking these medicines.        Dose/Directions    order for DME   Used for:  Varicose  veins of both lower extremities   Started by:  Terri Hill, CNP        Equipment being ordered: RAH HOSE to knee 10-15 mmHg calf width 14 cm bilaterally   Quantity:  2 Units   Refills:  0            Where to get your medicines      Some of these will need a paper prescription and others can be bought over the counter.  Ask your nurse if you have questions.     Bring a paper prescription for each of these medications     order for DME                Primary Care Provider Office Phone # Fax #    Vince Vasquez [phone]       [address] [facility] [address]        Equal Access to Services     [facility]: Hadii aad ku hadasho Soomaali, waaxda luqadaha, qaybta kaalmada adeegyada, cayetano lau . So [facility] [phone].    ATENCIÓN: Si habla español, tiene a weathers disposición servicios gratuitos de asistencia lingüística. [facility] [phone].    We comply with applicable federal civil rights laws and [place] laws. We do not discriminate on the basis of race, color, national origin, age, disability, sex, sexual orientation, or gender identity.            Thank you!     Thank you for choosing [facility]  for your care. Our goal is always to provide you with excellent care. Hearing back from our patients is one way we can continue to improve our services. Please take a few minutes to complete the written survey that you may receive in the mail after your visit with us. Thank you!             Your Updated Medication List - Protect others around you: Learn how to safely use, store and throw away your medicines at www.disposemymeds.org.          This list is accurate as of: 10/17/17  3:44 PM.  Always use your most recent med list.                   Brand Name Dispense Instructions for use Diagnosis    * albuterol (2.5 MG/3ML) 0.083% neb solution     60 vial    Take 3 mLs (2.5 mg) by nebulization every 6 hours as needed for  shortness of breath / dyspnea    Chest wall pain, Cough       * VENTOLIN  (90 BASE) MCG/ACT Inhaler   Generic drug:  albuterol     25.5 g    Inhale 2 puffs into the lungs every 6 hours as needed for shortness of breath / dyspnea    Intermittent asthma, uncomplicated, Chronic rhinitis       allopurinol 100 MG tablet    ZYLOPRIM    90 tablet    TAKE 1 tablet (100 MG) by MOUTH daily **NEED TO BE SEEN FOR FURTHER REFILLS**    Chronic gout of multiple sites, unspecified cause       buPROPion 150 MG 12 hr tablet    WELLBUTRIN SR    60 tablet    Take 1 tablet once daily and increase to 1 tablet twice daily after 4 to 7 days    Encounter for smoking cessation counseling       calcium 600 MG tablet     60 Tab    1 TABLET TWICE DAILY WITH FOOD        colchicine 0.6 MG tablet    COLCRYS    30 tablet    Take 2 tablets at the first sign of flare, take 1 additional tablet one hour later. Then take 1 tablet twice daily until your gout flare is gone    History of gout       fish oil-omega-3 fatty acids 1000 MG capsule     120 Cap    1 CAPSULE DAILY        fluticasone 220 MCG/ACT Inhaler    FLOVENT HFA    1 Inhaler    Inhale 2 puffs into the lungs 2 times daily    Intermittent asthma       lisinopril 5 MG tablet    PRINIVIL/ZESTRIL    90 tablet    Take 1 tablet (5 mg) by mouth daily ONE DAILY    Benign essential hypertension       metoprolol 50 MG tablet    LOPRESSOR    90 tablet    Take 1 tablet (50 mg) by mouth daily    Benign essential hypertension       montelukast 10 MG tablet    SINGULAIR    90 tablet    Take 1 tablet (10 mg) by mouth At Bedtime    Intermittent asthma, uncomplicated, Chronic rhinitis       multivitamin per tablet     100 Tab    ONE DAILY        nicotine 14 MG/24HR 24 hr patch    NICODERM CQ    30 patch    Place 1 patch onto the skin every 24 hours    Encounter for smoking cessation counseling       order for DME     1 Device    Equipment being ordered: Nebulizer    Chest wall pain, Cough       order for  DME     2 Units    Equipment being ordered: RAH HOSE to knee 10-15 mmHg calf width 14 cm bilaterally    Varicose veins of both lower extremities       vitamin B complex with vitamin C Tabs tablet      Take 1 tablet by mouth 2 times daily.        vitamin D3 1000 UNITS Caps     30 Cap    1 CAPSULE DAILY        * Notice:  This list has 2 medication(s) that are the same as other medications prescribed for you. Read the directions carefully, and ask your doctor or other care provider to review them with you.

## 2017-10-17 NOTE — LETTER
My Asthma Action Plan  Name: Fabi Rizzo   YOB: 1955  Date: 10/17/2017   My doctor: Terri Hill CNP   My clinic: [facility]        My Control Medicine: Fluticasone propionate (Flovent) -   mcg 2 puffs 2 times daily   And Singulair 10 mg daily    My Rescue Medicine: Albuterol (Proair/Ventolin/Proventil) inhaler 2 puffs every 6 hours as needed   My Asthma Severity: intermittent  Avoid your asthma triggers: Patient is unaware of triggers               GREEN ZONE   Good Control    I feel good    No cough or wheeze    Can work, sleep and play without asthma symptoms       Take your asthma control medicine every day.     1. If exercise triggers your asthma, take your rescue medication    15 minutes before exercise or sports, and    During exercise if you have asthma symptoms  2. Spacer to use with inhaler: If you have a spacer, make sure to use it with your inhaler             YELLOW ZONE Getting Worse  I have ANY of these:    I do not feel good    Cough or wheeze    Chest feels tight    Wake up at night   1. Keep taking your Green Zone medications  2. Start taking your rescue medicine:    every 20 minutes for up to 1 hour. Then every 4 hours for 24-48 hours.  3. If you stay in the Yellow Zone for more than 12-24 hours, contact your doctor.  4. If you do not return to the Green Zone in 12-24 hours or you get worse, start taking your oral steroid medicine if prescribed by your provider.           RED ZONE Medical Alert - Get Help  I have ANY of these:    I feel awful    Medicine is not helping    Breathing getting harder    Trouble walking or talking    Nose opens wide to breathe       1. Take your rescue medicine NOW  2. If your provider has prescribed an oral steroid medicine, start taking it NOW  3. Call your doctor NOW  4. If you are still in the Red Zone after 20 minutes and you have not reached your doctor:    Take your rescue medicine again and    Call 911 or go  to the emergency room right away    See your regular doctor within 2 weeks of an Emergency Room or Urgent Care visit for follow-up treatment.        Electronically signed by: Luz Maria Cyr, October 17, 2017    Annual Reminders:  Meet with Asthma Educator,  Flu Shot in the Fall, consider Pneumonia Vaccination for patients with asthma (aged 19 and older).    Pharmacy:    [id number] - [place], [place] - [address] [facility]  [facility] - [place], [place] - [address] [facility] - [place], [place] - [address] [facility]                    Asthma Triggers  How To Control Things That Make Your Asthma Worse    Triggers are things that make your asthma worse.  Look at the list below to help you find your triggers and what you can do about them.  You can help prevent asthma flare-ups by staying away from your triggers.      Trigger                                                          What you can do   Cigarette Smoke  Tobacco smoke can make asthma worse. Do not allow smoking in your home, car or around you.  Be sure no one smokes at a child s day care or school.  If you smoke, ask your health care provider for ways to help you quit.  Ask family members to quit too.  Ask your health care provider for a referral to Quit Plan to help you quit smoking, or call 7-509-699-PLAN.     Colds, Flu, Bronchitis  These are common triggers of asthma. Wash your hands often.  Don t touch your eyes, nose or mouth.  Get a flu shot every year.     Dust Mites  These are tiny bugs that live in cloth or carpet. They are too small to see. Wash sheets and blankets in hot water every week.   Encase pillows and mattress in dust mite proof covers.  Avoid having carpet if you can. If you have carpet, vacuum weekly.   Use a dust mask and HEPA vacuum.   Pollen and Outdoor Mold  Some people are allergic to trees, grass, or weed pollen, or molds. Try to keep your windows closed.  Limit time out doors when pollen count is  high.   Ask you health care provider about taking medicine during allergy season.     Animal Dander  Some people are allergic to skin flakes, urine or saliva from pets with fur or feathers. Keep pets with fur or feathers out of your home.    If you can t keep the pet outdoors, then keep the pet out of your bedroom.  Keep the bedroom door closed.  Keep pets off cloth furniture and away from stuffed toys.     Mice, Rats, and Cockroaches  Some people are allergic to the waste from these pests.   Cover food and garbage.  Clean up spills and food crumbs.  Store grease in the refrigerator.   Keep food out of the bedroom.   Indoor Mold  This can be a trigger if your home has high moisture. Fix leaking faucets, pipes, or other sources of water.   Clean moldy surfaces.  Dehumidify basement if it is damp and smelly.   Smoke, Strong Odors, and Sprays  These can reduce air quality. Stay away from strong odors and sprays, such as perfume, powder, hair spray, paints, smoke incense, paint, cleaning products, candles and new carpet.   Exercise or Sports  Some people with asthma have this trigger. Be active!  Ask your doctor about taking medicine before sports or exercise to prevent symptoms.    Warm up for 5-10 minutes before and after sports or exercise.     Other Triggers of Asthma  Cold air:  Cover your nose and mouth with a scarf.  Sometimes laughing or crying can be a trigger.  Some medicines and food can trigger asthma.

## 2017-10-17 NOTE — PROGRESS NOTES
SUBJECTIVE:   Fabi Rizzo is a 62 year old female who presents to clinic today for the following health issues:      Rt lower leg swelling      Duration: 4 days     Description (location/character/radiation): Rt knee to ankle swelling after a fall    Intensity:  Moderate at night, none at this time     Accompanying signs and symptoms:     History (similar episodes/previous evaluation): History of Rt leg fracture with hardware placement  10 years ago     Precipitating or alleviating factors: None    Therapies tried and outcome: IBU as needed        HPI:   PCP:  Vince Vasquez [phone]    Patient Active Problem List   Diagnosis     CARDIOVASCULAR SCREENING; LDL GOAL LESS THAN 130     Benign essential hypertension     Intermittent asthma     GERD (gastroesophageal reflux disease)     Major depressive disorder, recurrent episode, mild (HCC)     Advanced directives, counseling/discussion     Alcohol use     Chronic low back pain     Spondylolisthesis of lumbar region     Lumbar foraminal stenosis     Chronic rhinitis     Chronic gout of multiple sites, unspecified cause     Tobacco use disorder     Varicose veins of both lower extremities     Current Outpatient Prescriptions   Medication     order for DME     montelukast (SINGULAIR) 10 MG tablet     VENTOLIN  (90 BASE) MCG/ACT Inhaler     metoprolol (LOPRESSOR) 50 MG tablet     allopurinol (ZYLOPRIM) 100 MG tablet     lisinopril (PRINIVIL/ZESTRIL) 5 MG tablet     buPROPion (WELLBUTRIN SR) 150 MG 12 hr tablet     fluticasone (FLOVENT HFA) 220 MCG/ACT Inhaler     albuterol (2.5 MG/3ML) 0.083% nebulizer solution     vitamin  B complex with vitamin C (VITAMIN  B COMPLEX) TABS     MULTIVITAMINS PO TABS     CALCIUM 600 MG PO TABS     VITAMIN D3 1000 UNIT PO CAPS     FISH OIL 1000 MG PO CAPS     nicotine (NICODERM CQ) 14 MG/24HR 24 hr patch     colchicine (COLCRYS) 0.6 MG tablet     ORDER FOR DME     No current facility-administered medications  for this visit.        Health Maintenance Due   Topic Date Due     HEPATITIS C SCREENING  02/15/1974     MAMMO SCREEN Q2 YR (SYSTEM ASSIGNED)  11/21/2010     COLON CANCER SCREEN (SYSTEM ASSIGNED)  09/01/2016     ASTHMA ACTION PLAN Q1 YR  09/03/2016     DEPRESSION ACTION PLAN Q1 YR  09/03/2016     INFLUENZA VACCINE (SYSTEM ASSIGNED)  09/01/2017     ADVANCE DIRECTIVE PLANNING Q5 YRS  09/21/2017     ASTHMA CONTROL TEST Q6 MOS  11/05/2017     PHQ-9 Q6 MONTHS  11/05/2017       Reviewed and updated:  Tobacco  Allergies  Meds  Med Hx  Surg Hx  Fam Hx  Soc Hx     ROS:  Constitutional, HEENT, cardiovascular, pulmonary, gi and gu systems are negative, except as otherwise noted.      PHYSICAL EXAM:   /68 (BP Location: Right arm, Patient Position: Sitting, Cuff Size: Adult Regular)  Pulse 72  Temp 98.1  F (36.7  C) (Tympanic)  Resp 20  Wt 137 lb (62.1 kg)  SpO2 98%  BMI 23.52 kg/m2  Body mass index is 23.52 kg/(m^2).  GENERAL APPEARANCE: healthy, alert and no distress  RESP: lungs clear to auscultation - no rales, rhonchi or wheezes  CV: regular rates and rhythm, normal S1 S2, no S3 or S4 and no murmur, click or rub  MS: extremities normal- no gross deformities noted, varicose veins bilalteral, peripheral pulses normal, and normal range of motion   Bilateral calf width- 14 cm  Knee Exam: right  Inspection: AP/lateral alignment normal, No effusion, No quad atrophy  Tender: popliteal region  Non-tender: remainder  Active Range of Motion: all normal  Strength: quad  5/5, Hamstrings  5/5, Gastroc  5/5, Tibialis anterior  5/5 and Peroneals  5/5  Special tests: normal Valgus stress test, normal Varus, negative Lachman's test, negative pivot shift, negative posterior drawer, Pain with anterior drawer  Also examined: hip full range of motion     SKIN: no suspicious lesions or rashes  PSYCH: mentation appears normal and affect normal/bright    ASSESSMENT & PLAN:     (M70.51) Bursitis of right knee, unspecified bursa   (primary encounter diagnosis)  Comment: acute  Plan: US Lower Extremity Venous Duplex Bilateral        See plan    (I83.93) Varicose veins of both lower extremities  Comment: chronic, stable  Plan: order for DME            (I83.891) Varicose veins of leg with edema, right  Comment: chronic, stable  Plan: US Lower Extremity Venous Duplex Bilateral            (Z12.11) Special screening for malignant neoplasms, colon  Comment: screen  Plan: Fecal colorectal cancer screen (FIT)              Patient Instructions     Schedule Ultrasound in [place]    Follow instructions below for bursitis    Schedule Mammogram    Complete FIT TEST at home and mail in      Bursitis  You have bursitis. This is an inflammation of the bursa. These are small, fluid-filled sacs that surround the larger joints of the body. The bursa help the muscles and tendons move smoothly over the joints.  Bursitis often happens in the shoulder. But it can also affect the elbows, hips, pelvis, knees, toes, and heels. Bursitis can be caused by injury, overuse of the joint, or infection of the bursa. Symptoms include pain and tenderness over a joint. Symptoms get worse with movement.  Bursitis is treated with an anti-inflammatory medicine and by resting the joint. More severe cases require injection of medicine directly into the bursa.    Home care    Rest the painful joint and protect it from movement. This will allow the inflammation to heal faster.    Apply an ice pack over the injured area for no more than 15 to 20 minutes. Do this every 3 to 6 hours for the first 24 to 48 hours. Keep using ice packs 3 to 4 times a day until the pain and swelling improves.     To make an ice pack, put ice cubes in a sealed plastic zip-lock bag. Wrap the bag in a clean, thin towel or cloth. Never put ice or an ice pack directly on the skin. As the ice melts, be careful to avoid getting any wrap or splint wet.    You may take over-the-counter pain medicine to treat pain and  inflammation, unless another medicine was prescribed. Anti-inflammatory pain medicines may be more effective. Talk with your provider beforeusing these medicines if you have chronic liver or kidney disease, or ever had a stomach ulcer or GI (gastrointestinal) bleeding.    As your symptoms improve, slowly begin to move the joint. Do not overuse the joint. This may cause the symptoms to flare up again.  When to seek medical advice  Call your healthcare provider right away if any of these occur:    Redness over the painful area    Increasing pain or swelling at the joint    Fever of 100.4 F (38 C) or above lasting for 24 to 48 hours  Date Last Reviewed: 11/21/2015 2000-2017 [employer]. [address] [place] [address], [place] [address]. All rights reserved. This information is not intended as a substitute for professional medical care. Always follow your healthcare professional's instructions.        Self-Care for Spider and Varicose Veins  Your healthcare provider may suggest that you try self-care. Exercising and maintaining a healthy weight may keep problem veins from getting worse. Wearing elastic stockings and elevating your legs can help improve blood flow. Taking breaks when you sit or stand helps, too.     The top of the elastic stocking should be below the bend in your knee for a proper fit.   Exercising  Exercising is good for your veins because it improves blood flow. Walking, cycling, or swimming are great exercises for vein health. But be sure to check with your healthcare provider before starting any exercise program. Also, keep these hints in mind:    When exercising, start out slowly and try to build up to 30 minutes on most days.    Elevate your legs above heart level after exercise to keep blood from pooling in veins.  Maintaining a healthy weight  Being overweight puts extra pressure on your veins. To maintain a healthy weight, try these tips:    Choose lean meats, fish, and skinless  chicken.    Use low-fat dairy products.    Eat foods high in fiber, such as whole grains, fruits, and vegetables.    Cut down on sugar, salt, and saturated and trans fats.    Exercise regularly.  Wearing elastic stockings  Elastic stockings gently squeeze veins so blood flows upward. If you need elastic stockings, your healthcare provider can prescribe them for you. Follow your healthcare provider s advice about how and when to wear them. Elastic stockings come in several different levels of pressure. Ask your healthcare provider which level of pressure would benefit you the most.   Elevating your legs  Raising your legs above heart level will help relieve swelling and keep blood from pooling in veins. Try to elevate your legs for 15 to 20 minutes at the end of the day, and whenever you re relaxing. To make sure your legs are raised above heart level, prop them up on cushions or large pillows.  When sitting and standing  To keep blood moving when you have to sit or stand for long periods, try these tips:    At work, take walking breaks instead of coffee breaks. Walk during your lunch hour. Or try flexing your feet up and down 10 times each hour.    When standing, raise yourself up and down on your toes, or rock back and forth on your heels.  Date Last Reviewed: 5/1/2016 2000-2017 [employer]. [address] [place] [address], [place] [address]. All rights reserved. This information is not intended as a substitute for professional medical care. Always follow your healthcare professional's instructions.          Risks, benefits, side effects and rationale for treatment plan fully discussed with the patient and understanding expressed.    Terri Hill, [facility]

## 2017-10-17 NOTE — NURSING NOTE
"Chief Complaint   Patient presents with     Leg Swelling       Initial /68 (BP Location: Right arm, Patient Position: Sitting, Cuff Size: Adult Regular)  Pulse 72  Temp 98.1  F (36.7  C) (Tympanic)  Resp 20  Wt 137 lb (62.1 kg)  SpO2 98%  BMI 23.52 kg/m2 Estimated body mass index is 23.52 kg/(m^2) as calculated from the following:    Height as of 10/26/16: 5' 4\" (1.626 m).    Weight as of this encounter: 137 lb (62.1 kg).  Medication Reconciliation: complete    Health Maintenance that is potentially due pending provider review:  Mammogram and Colonoscopy/FIT    Pt will schedule appt's. In the future     Is there anyone who you would like to be able to receive your results? No  If yes have patient fill out WILLIAM    "

## 2017-10-17 NOTE — LETTER
My Depression Action Plan  Name: Fabi Rizzo   Date of Birth 1955  Date: 10/17/2017    My doctor: Vince Vasquez   My clinic: [address] [address]  [phone]          GREEN    ZONE   Good Control    What it looks like:     Things are going generally well. You have normal up s and down s. You may even feel depressed from time to time, but bad moods usually last less than a day.   What you need to do:  1. Continue to care for yourself (see self care plan)  2. Check your depression survival kit and update it as needed  3. Follow your physician s recommendations including any medication.  4. Do not stop taking medication unless you consult with your physician first.           YELLOW         ZONE Getting Worse    What it looks like:     Depression is starting to interfere with your life.     It may be hard to get out of bed; you may be starting to isolate yourself from others.    Symptoms of depression are starting to last most all day and this has happened for several days.     You may have suicidal thoughts but they are not constant.   What you need to do:     1. Call your care team, your response to treatment will improve if you keep your care team informed of your progress. Yellow periods are signs an adjustment may need to be made.     2. Continue your self-care, even if you have to fake it!    3. Talk to someone in your support network    4. Open up your depression survival kit           RED    ZONE Medical Alert - Get Help    What it looks like:     Depression is seriously interfering with your life.     You may experience these or other symptoms: You can t get out of bed most days, can t work or engage in other necessary activities, you have trouble taking care of basic hygiene, or basic responsibilities, thoughts of suicide or death that will not go away, self-injurious behavior.     What you need to do:  1. Call your  care team and request a same-day appointment. If they are not available (weekends or after hours) call your local crisis line, emergency room or 911.      Electronically signed by: Luz Maria Cyr, October 17, 2017    Depression Self Care Plan / Survival Kit    Self-Care for Depression  Here s the deal. Your body and mind are really not as separate as most people think.  What you do and think affects how you feel and how you feel influences what you do and think. This means if you do things that people who feel good do, it will help you feel better.  Sometimes this is all it takes.  There is also a place for medication and therapy depending on how severe your depression is, so be sure to consult with your medical provider and/ or Behavioral Health Consultant if your symptoms are worsening or not improving.     In order to better manage my stress, I will:    Exercise  Get some form of exercise, every day. This will help reduce pain and release endorphins, the  feel good  chemicals in your brain. This is almost as good as taking antidepressants!  This is not the same as joining a gym and then never going! (they count on that by the way ) It can be as simple as just going for a walk or doing some gardening, anything that will get you moving.      Hygiene   Maintain good hygiene (Get out of bed in the morning, Make your bed, Brush your teeth, Take a shower, and Get dressed like you were going to work, even if you are unemployed).  If your clothes don't fit try to get ones that do.    Diet  I will strive to eat foods that are good for me, drink plenty of water, and avoid excessive sugar, caffeine, alcohol, and other mood-altering substances.  Some foods that are helpful in depression are: complex carbohydrates, B vitamins, flaxseed, fish or fish oil, fresh fruits and vegetables.    Psychotherapy  I agree to participate in Individual Therapy (if recommended).    Medication  If prescribed medications, I agree to take them.   Missing doses can result in serious side effects.  I understand that drinking alcohol, or other illicit drug use, may cause potential side effects.  I will not stop my medication abruptly without first discussing it with my provider.    Staying Connected With Others  I will stay in touch with my friends, family members, and my primary care provider/team.    Use your imagination  Be creative.  We all have a creative side; it doesn t matter if it s oil painting, sand castles, or mud pies! This will also kick up the endorphins.    Witness Beauty  (AKA stop and smell the roses) Take a look outside, even in mid-winter. Notice colors, textures. Watch the squirrels and birds.     Service to others  Be of service to others.  There is always someone else in need.  By helping others we can  get out of ourselves  and remember the really important things.  This also provides opportunities for practicing all the other parts of the program.    Humor  Laugh and be silly!  Adjust your TV habits for less news and crime-drama and more comedy.    Control your stress  Try breathing deep, massage therapy, biofeedback, and meditation. Find time to relax each day.     My support system    Clinic Contact:  Phone number:    Contact 1:  Phone number:    Contact 2:  Phone number:    [employer]/:  Phone number:    Therapist:  Phone number:    Local crisis center:    Phone number:    Other community support:  Phone number:

## 2017-10-17 NOTE — PATIENT INSTRUCTIONS
Schedule Ultrasound in [place]    Follow instructions below for bursitis    Schedule Mammogram    Complete FIT TEST at home and mail in      Bursitis  You have bursitis. This is an inflammation of the bursa. These are small, fluid-filled sacs that surround the larger joints of the body. The bursa help the muscles and tendons move smoothly over the joints.  Bursitis often happens in the shoulder. But it can also affect the elbows, hips, pelvis, knees, toes, and heels. Bursitis can be caused by injury, overuse of the joint, or infection of the bursa. Symptoms include pain and tenderness over a joint. Symptoms get worse with movement.  Bursitis is treated with an anti-inflammatory medicine and by resting the joint. More severe cases require injection of medicine directly into the bursa.    Home care    Rest the painful joint and protect it from movement. This will allow the inflammation to heal faster.    Apply an ice pack over the injured area for no more than 15 to 20 minutes. Do this every 3 to 6 hours for the first 24 to 48 hours. Keep using ice packs 3 to 4 times a day until the pain and swelling improves.     To make an ice pack, put ice cubes in a sealed plastic zip-lock bag. Wrap the bag in a clean, thin towel or cloth. Never put ice or an ice pack directly on the skin. As the ice melts, be careful to avoid getting any wrap or splint wet.    You may take over-the-counter pain medicine to treat pain and inflammation, unless another medicine was prescribed. Anti-inflammatory pain medicines may be more effective. Talk with your provider beforeusing these medicines if you have chronic liver or kidney disease, or ever had a stomach ulcer or GI (gastrointestinal) bleeding.    As your symptoms improve, slowly begin to move the joint. Do not overuse the joint. This may cause the symptoms to flare up again.  When to seek medical advice  Call your healthcare provider right away if any of these occur:    Redness over  the painful area    Increasing pain or swelling at the joint    Fever of 100.4 F (38 C) or above lasting for 24 to 48 hours  Date Last Reviewed: 11/21/2015 2000-2017 [employer]. [address] [place] [address], [place] [address]. All rights reserved. This information is not intended as a substitute for professional medical care. Always follow your healthcare professional's instructions.        Self-Care for Spider and Varicose Veins  Your healthcare provider may suggest that you try self-care. Exercising and maintaining a healthy weight may keep problem veins from getting worse. Wearing elastic stockings and elevating your legs can help improve blood flow. Taking breaks when you sit or stand helps, too.     The top of the elastic stocking should be below the bend in your knee for a proper fit.   Exercising  Exercising is good for your veins because it improves blood flow. Walking, cycling, or swimming are great exercises for vein health. But be sure to check with your healthcare provider before starting any exercise program. Also, keep these hints in mind:    When exercising, start out slowly and try to build up to 30 minutes on most days.    Elevate your legs above heart level after exercise to keep blood from pooling in veins.  Maintaining a healthy weight  Being overweight puts extra pressure on your veins. To maintain a healthy weight, try these tips:    Choose lean meats, fish, and skinless chicken.    Use low-fat dairy products.    Eat foods high in fiber, such as whole grains, fruits, and vegetables.    Cut down on sugar, salt, and saturated and trans fats.    Exercise regularly.  Wearing elastic stockings  Elastic stockings gently squeeze veins so blood flows upward. If you need elastic stockings, your healthcare provider can prescribe them for you. Follow your healthcare provider s advice about how and when to wear them. Elastic stockings come in several different levels of pressure. Ask your  healthcare provider which level of pressure would benefit you the most.   Elevating your legs  Raising your legs above heart level will help relieve swelling and keep blood from pooling in veins. Try to elevate your legs for 15 to 20 minutes at the end of the day, and whenever you re relaxing. To make sure your legs are raised above heart level, prop them up on cushions or large pillows.  When sitting and standing  To keep blood moving when you have to sit or stand for long periods, try these tips:    At work, take walking breaks instead of coffee breaks. Walk during your lunch hour. Or try flexing your feet up and down 10 times each hour.    When standing, raise yourself up and down on your toes, or rock back and forth on your heels.  Date Last Reviewed: 5/1/2016 2000-2017 [employer]. [address] [place] [address], [place] [address]. All rights reserved. This information is not intended as a substitute for professional medical care. Always follow your healthcare professional's instructions.

## 2017-10-20 ENCOUNTER — NURSE TRIAGE (OUTPATIENT)
Dept: NURSING | Facility: CLINIC | Age: 62
End: 2017-10-20

## 2017-10-20 ENCOUNTER — TELEPHONE (OUTPATIENT)
Dept: FAMILY MEDICINE | Facility: CLINIC | Age: 62
End: 2017-10-20

## 2017-10-20 NOTE — TELEPHONE ENCOUNTER
Patient was in this week for swelling in her calf. Was supposed to have an ultrasound and can't get in until Monday. Can you do a d-dimer test on her today? Daughter is really upset about the delay. May leave a detailed message.  Lisa Garcia RN-[facility]

## 2017-10-20 NOTE — TELEPHONE ENCOUNTER
Patient can't get in for ultrasound until Monday. Seen for pain in calf. Requesting d-dimer be drawn today. High priority endcounter sent to clinic for them to call her.  Lisa Garcia RN-[facility]

## 2017-10-20 NOTE — TELEPHONE ENCOUNTER
Discussed with the pt, if any worsening calf pain over the weekend she can utilize the ED for evaluation, treatment and imaging, pt does agree to this plan, and did discuss a d dimer, pt will have US initially.  Margaret Lozano RN

## 2017-11-03 DIAGNOSIS — Z71.6 ENCOUNTER FOR SMOKING CESSATION COUNSELING: ICD-10-CM

## 2017-11-03 RX ORDER — BUPROPION HYDROCHLORIDE 150 MG/1
TABLET, EXTENDED RELEASE ORAL
Qty: 60 TABLET | Refills: 0 | Status: SHIPPED | OUTPATIENT
Start: 2017-11-03 | End: 2018-01-03

## 2017-11-03 NOTE — TELEPHONE ENCOUNTER
Bupropion  HCL SR 150mg  Last Written Prescription Date: 05.05.2017  Last Fill Quantity: 60; # refills: 2  Last Office Visit with [facility], P or [facility] prescribing provider:  10.17.2017        Last PHQ-9 score on record=   PHQ-9 SCORE 5/5/2017   Total Score -   Total Score 3       Lab Results   Component Value Date    AST 30 12/08/2012     Lab Results   Component Value Date    ALT 36 12/08/2012     Lisa Biggs CSS

## 2017-12-14 DIAGNOSIS — M1A.09X0 CHRONIC GOUT OF MULTIPLE SITES, UNSPECIFIED CAUSE: ICD-10-CM

## 2017-12-15 RX ORDER — ALLOPURINOL 100 MG/1
TABLET ORAL
Qty: 30 TABLET | Refills: 0 | Status: SHIPPED | OUTPATIENT
Start: 2017-12-15 | End: 2018-01-10

## 2017-12-20 DIAGNOSIS — I10 BENIGN ESSENTIAL HYPERTENSION: ICD-10-CM

## 2017-12-21 RX ORDER — LISINOPRIL 5 MG/1
TABLET ORAL
Qty: 90 TABLET | Refills: 0 | Status: SHIPPED | OUTPATIENT
Start: 2017-12-21 | End: 2018-02-16

## 2017-12-21 RX ORDER — METOPROLOL TARTRATE 50 MG
TABLET ORAL
Qty: 90 TABLET | Refills: 0 | Status: SHIPPED | OUTPATIENT
Start: 2017-12-21 | End: 2018-02-16

## 2018-01-03 DIAGNOSIS — Z71.6 ENCOUNTER FOR SMOKING CESSATION COUNSELING: ICD-10-CM

## 2018-01-03 RX ORDER — BUPROPION HYDROCHLORIDE 150 MG/1
TABLET, EXTENDED RELEASE ORAL
Qty: 60 TABLET | Refills: 1 | Status: SHIPPED | OUTPATIENT
Start: 2018-01-03 | End: 2018-02-16

## 2018-01-10 DIAGNOSIS — M1A.09X0 CHRONIC GOUT OF MULTIPLE SITES, UNSPECIFIED CAUSE: ICD-10-CM

## 2018-01-10 RX ORDER — ALLOPURINOL 100 MG/1
TABLET ORAL
Qty: 30 TABLET | Refills: 0 | Status: SHIPPED | OUTPATIENT
Start: 2018-01-10 | End: 2018-02-16

## 2018-01-10 NOTE — TELEPHONE ENCOUNTER
"Requested Prescriptions   Pending Prescriptions Disp Refills     allopurinol (ZYLOPRIM) 100 MG tablet [Pharmacy Med Name: ALLOPURINOL 100 MG TABLET] 30 tablet      Sig: TAKE 1 tablet (100 MG) by MOUTH daily **NEED TO BE SEEN FOR FURTHER REFILLS**    Gout Agents Protocol Failed    1/10/2018  2:02 PM       Failed - CBC on file in past 12 months    Recent Labs   Lab Test  06/15/16   1030   WBC  4.7   RBC  4.12   HGB  14.6   HCT  43.4   PLT  236            Failed - ALT on file in past 12 months    Recent Labs   Lab Test  12/08/12   2045   ALT  36            Failed - Uric acid greater than or equal to 6 on file in past 12 months    Recent Labs   Lab Test  12/29/15   1115   URIC  8.2*     If level is 6mg/dL or greater, ok to refill one time and refer to provider.          Failed - Normal serum creatinine on file in the past 12 months    Recent Labs   Lab Test  11/04/15   1620   CR  0.89            Passed - Recent or future visit with authorizing provider's specialty    Patient had office visit in the last year or has a visit in the next 30 days with authorizing provider.  See \"Patient Info\" tab in inbasket, or \"Choose Columns\" in Meds & Orders section of the refill encounter.              Passed - Patient is age 18 or older       Passed - No active pregnancy on record       Passed - No positive pregnancy test in past year        Last Written Prescription Date:  12/15/17  Last Fill Quantity: 30,  # refills: 0   Last Office Visit with [facility], [facility] or [facility] prescribing provider:  10/17/17   Future Office Visit:       "

## 2018-01-10 NOTE — LETTER
[facility]  [address] [facility] [address]  [phone]        May 16, 2018    Fabi Rizzo  [address] [facility] [address]              Dear Fabi Rizzo    This is to remind you that you have multiple labs due.    You may call our office at [phone] to schedule an appointment.    Please disregard this notice if you have already had your labs drawn or made an appointment.        Sincerely,        Adrianna Watson CNP

## 2018-02-01 DIAGNOSIS — J45.20 INTERMITTENT ASTHMA, UNCOMPLICATED: ICD-10-CM

## 2018-02-01 DIAGNOSIS — J31.0 CHRONIC RHINITIS: ICD-10-CM

## 2018-02-01 RX ORDER — ALBUTEROL SULFATE 90 UG/1
2 AEROSOL, METERED RESPIRATORY (INHALATION) EVERY 6 HOURS PRN
Qty: 54 G | Refills: 0 | Status: SHIPPED | OUTPATIENT
Start: 2018-02-01 | End: 2018-06-29

## 2018-02-01 NOTE — TELEPHONE ENCOUNTER
"Requested Prescriptions   Pending Prescriptions Disp Refills     VENTOLIN  (90 BASE) MCG/ACT Inhaler [Pharmacy Med Name: VENTOLIN HFA 90 MCG HFA AER AD] 54 g      Sig: Inhale 2 puffs into the lungs every 6 hours as needed for shortness of breath / dyspnea    Asthma Maintenance Inhalers - Anticholinergics Failed    2/1/2018 10:16 AM       Failed - Asthma control test score is 20 or greater in last 6 months    Please review ACT score.     ACT Total Scores 11/4/2015 5/5/2017 5/5/2017   ACT TOTAL SCORE - - -   ASTHMA ER VISITS - - -   ASTHMA HOSPITALIZATIONS - - -   ACT TOTAL SCORE (Goal Greater than or Equal to 20) 20 18 18   In the past 12 months, how many times did you visit the emergency room for your asthma without being admitted to the hospital? 0 0 0   In the past 12 months, how many times were you hospitalized overnight because of your asthma? 0 0 0              Passed - Patient is age 12 years or older       Passed - Recent (6 mo) or future visit with authorizing provider's specialty    Patient had office visit in the last 6 months or has a visit in the next 30 days with authorizing provider.  See \"Patient Info\" tab in inbasket, or \"Choose Columns\" in Meds & Orders section of the refill encounter.      Last Written Prescription Date:  9/13/17  Last Fill Quantity: 25.5g,  # refills: 0   Last Office Visit with [facility] provider:  10/17/17   Future Office Visit:                 "

## 2018-02-02 DIAGNOSIS — J45.20 INTERMITTENT ASTHMA: ICD-10-CM

## 2018-02-02 RX ORDER — FLUTICASONE PROPIONATE 220 UG/1
2 AEROSOL, METERED RESPIRATORY (INHALATION) 2 TIMES DAILY
Qty: 12 G | Refills: 0 | Status: SHIPPED | OUTPATIENT
Start: 2018-02-02 | End: 2018-02-16

## 2018-02-02 NOTE — TELEPHONE ENCOUNTER
Requested Prescriptions   Pending Prescriptions Disp Refills     FLOVENT  MCG/ACT Inhaler [Pharmacy Med Name: FLOVENT  MCG AER W/ADAP] 12 g      Sig: Inhale 2 puffs into the lungs 2 times daily    There is no refill protocol information for this order        fluticasone (FLOVENT HFA) 220 MCG/ACT Inhaler  Last Written Prescription Date:  02/16/2017  Last Fill Quantity: 1 inhaler,  # refills: 1   Last Office Visit with [facility] provider:  10/17/2017   Future Office Visit:       ACT Total Scores 11/4/2015 5/5/2017 5/5/2017   ACT TOTAL SCORE - - -   ASTHMA ER VISITS - - -   ASTHMA HOSPITALIZATIONS - - -   ACT TOTAL SCORE (Goal Greater than or Equal to 20) 20 18 18   In the past 12 months, how many times did you visit the emergency room for your asthma without being admitted to the hospital? 0 0 0   In the past 12 months, how many times were you hospitalized overnight because of your asthma? 0 0 0     Sepideh ALEXANDER (R) (M)

## 2018-02-02 NOTE — TELEPHONE ENCOUNTER
ACT Total Scores 11/4/2015 5/5/2017 5/5/2017   ACT TOTAL SCORE - - -   ASTHMA ER VISITS - - -   ASTHMA HOSPITALIZATIONS - - -   ACT TOTAL SCORE (Goal Greater than or Equal to 20) 20 18 18   In the past 12 months, how many times did you visit the emergency room for your asthma without being admitted to the hospital? 0 0 0   In the past 12 months, how many times were you hospitalized overnight because of your asthma? 0 0 0     Refill given with attached appt reminder message.  FRANCHESCA Hicks RN

## 2018-02-16 ENCOUNTER — OFFICE VISIT (OUTPATIENT)
Dept: FAMILY MEDICINE | Facility: CLINIC | Age: 63
End: 2018-02-16
Payer: COMMERCIAL

## 2018-02-16 VITALS
WEIGHT: 139.4 LBS | RESPIRATION RATE: 24 BRPM | DIASTOLIC BLOOD PRESSURE: 60 MMHG | OXYGEN SATURATION: 99 % | TEMPERATURE: 96.3 F | BODY MASS INDEX: 23.93 KG/M2 | SYSTOLIC BLOOD PRESSURE: 110 MMHG | HEART RATE: 74 BPM

## 2018-02-16 DIAGNOSIS — M1A.09X0 CHRONIC GOUT OF MULTIPLE SITES, UNSPECIFIED CAUSE: ICD-10-CM

## 2018-02-16 DIAGNOSIS — I10 BENIGN ESSENTIAL HYPERTENSION: ICD-10-CM

## 2018-02-16 DIAGNOSIS — Z12.31 ENCOUNTER FOR SCREENING MAMMOGRAM FOR BREAST CANCER: Primary | ICD-10-CM

## 2018-02-16 DIAGNOSIS — Z71.6 ENCOUNTER FOR SMOKING CESSATION COUNSELING: ICD-10-CM

## 2018-02-16 DIAGNOSIS — J30.89 CHRONIC NON-SEASONAL ALLERGIC RHINITIS, UNSPECIFIED TRIGGER: ICD-10-CM

## 2018-02-16 DIAGNOSIS — J45.21 MILD INTERMITTENT ASTHMA WITH EXACERBATION: ICD-10-CM

## 2018-02-16 PROCEDURE — 99214 OFFICE O/P EST MOD 30 MIN: CPT | Performed by: NURSE PRACTITIONER

## 2018-02-16 RX ORDER — ALLOPURINOL 100 MG/1
100 TABLET ORAL DAILY
Qty: 90 TABLET | Refills: 3 | Status: SHIPPED | OUTPATIENT
Start: 2018-02-16 | End: 2019-01-29

## 2018-02-16 RX ORDER — PREDNISONE 20 MG/1
40 TABLET ORAL DAILY
Qty: 10 TABLET | Refills: 0 | Status: SHIPPED | OUTPATIENT
Start: 2018-02-16 | End: 2018-02-21

## 2018-02-16 RX ORDER — BUPROPION HYDROCHLORIDE 150 MG/1
150 TABLET, EXTENDED RELEASE ORAL 2 TIMES DAILY
Qty: 180 TABLET | Refills: 3 | Status: SHIPPED | OUTPATIENT
Start: 2018-02-16 | End: 2019-03-07

## 2018-02-16 RX ORDER — ALBUTEROL SULFATE 0.83 MG/ML
1 SOLUTION RESPIRATORY (INHALATION) EVERY 6 HOURS PRN
Status: CANCELLED | OUTPATIENT
Start: 2018-02-16

## 2018-02-16 RX ORDER — LISINOPRIL 2.5 MG/1
2.5 TABLET ORAL DAILY
Qty: 90 TABLET | Refills: 0 | Status: SHIPPED | OUTPATIENT
Start: 2018-02-16 | End: 2018-08-03

## 2018-02-16 RX ORDER — NICOTINE 21 MG/24HR
1 PATCH, TRANSDERMAL 24 HOURS TRANSDERMAL EVERY 24 HOURS
Qty: 30 PATCH | Refills: 11 | Status: CANCELLED | OUTPATIENT
Start: 2018-02-16

## 2018-02-16 RX ORDER — ALBUTEROL SULFATE 90 UG/1
AEROSOL, METERED RESPIRATORY (INHALATION)
Qty: 25.5 G | Refills: 3 | Status: SHIPPED | OUTPATIENT
Start: 2018-02-16 | End: 2019-01-02

## 2018-02-16 RX ORDER — MONTELUKAST SODIUM 10 MG/1
TABLET ORAL
Qty: 90 TABLET | Refills: 3 | Status: SHIPPED | OUTPATIENT
Start: 2018-02-16 | End: 2019-03-26

## 2018-02-16 RX ORDER — METOPROLOL TARTRATE 50 MG
TABLET ORAL
Qty: 90 TABLET | Refills: 3 | Status: SHIPPED | OUTPATIENT
Start: 2018-02-16 | End: 2019-03-18

## 2018-02-16 RX ORDER — FLUTICASONE PROPIONATE 220 UG/1
2 AEROSOL, METERED RESPIRATORY (INHALATION) 2 TIMES DAILY
Qty: 12 G | Refills: 0 | Status: SHIPPED | OUTPATIENT
Start: 2018-02-16 | End: 2018-06-29

## 2018-02-16 RX ORDER — LISINOPRIL 5 MG/1
5 TABLET ORAL DAILY
Qty: 90 TABLET | Refills: 3 | Status: CANCELLED | OUTPATIENT
Start: 2018-02-16

## 2018-02-16 RX ORDER — COLCHICINE 0.6 MG/1
TABLET ORAL
Qty: 30 TABLET | Refills: 3 | Status: SHIPPED | OUTPATIENT
Start: 2018-02-16 | End: 2020-08-31

## 2018-02-16 RX ORDER — ALBUTEROL SULFATE 90 UG/1
2 AEROSOL, METERED RESPIRATORY (INHALATION) EVERY 6 HOURS PRN
Qty: 54 G | Refills: 0 | Status: CANCELLED | OUTPATIENT
Start: 2018-02-16

## 2018-02-16 ASSESSMENT — ANXIETY QUESTIONNAIRES
7. FEELING AFRAID AS IF SOMETHING AWFUL MIGHT HAPPEN: SEVERAL DAYS
3. WORRYING TOO MUCH ABOUT DIFFERENT THINGS: NOT AT ALL
5. BEING SO RESTLESS THAT IT IS HARD TO SIT STILL: NOT AT ALL
6. BECOMING EASILY ANNOYED OR IRRITABLE: NOT AT ALL
2. NOT BEING ABLE TO STOP OR CONTROL WORRYING: NOT AT ALL
1. FEELING NERVOUS, ANXIOUS, OR ON EDGE: SEVERAL DAYS
GAD7 TOTAL SCORE: 2

## 2018-02-16 ASSESSMENT — PATIENT HEALTH QUESTIONNAIRE - PHQ9: 5. POOR APPETITE OR OVEREATING: NOT AT ALL

## 2018-02-16 NOTE — MR AVS SNAPSHOT
After Visit Summary   2/16/2018    Fabi Rizzo    MRN: [id number]           Patient Information     Date Of Birth          1955        Visit Information        Provider Department      2/16/2018 8:20 AM Adrianna Watson NP [facility]        Today's Diagnoses     Encounter for screening mammogram for breast cancer    -  1    Intermittent asthma without complication, unspecified asthma severity        Chronic non-seasonal allergic rhinitis, unspecified trigger        Chronic gout of multiple sites, unspecified cause        Encounter for smoking cessation counseling        Benign essential hypertension        History of gout        Chest wall pain        Cough        Mild intermittent asthma with exacerbation          Care Instructions    Complete and mail FIT (stool) test for colon cancer screening    Start prednisone 40 mg daily in AM for 5 days     Recheck in 1-2 weeks   Day of mammo would work well       Mammogram scheduled for         ___________________________    Cut back on lisinopril to half a tab or 2.5 mg   Refilled medication- will be help on file               Follow-ups after your visit        Future tests that were ordered for you today     Open Future Orders        Priority Expected Expires Ordered    *MA Screening Digital Bilateral Routine  2/16/2019 2/16/2018            Who to contact     If you have questions or need follow up information about today's clinic visit or your schedule please contact [facility] directly at [phone].  Normal or non-critical lab and imaging results will be communicated to you by MyChart, letter or phone within 4 business days after the clinic has received the results. If you do not hear from us within 7 days, please contact the clinic through MyChart or phone. If you have a critical or abnormal lab result, we will notify you by phone as soon as possible.  Submit refill requests through [employer] or call your  pharmacy and they will forward the refill request to us. Please allow 3 business days for your refill to be completed.          Additional Information About Your Visit        [employer] Information     [employer] gives you secure access to your electronic health record. If you see a primary care provider, you can also send messages to your care team and make appointments. If you have questions, please call your primary care clinic.  If you do not have a primary care provider, please call [phone] and they will assist you.        Care EveryWhere ID     This is your Care EveryWhere ID. This could be used by other organizations to access your [place] medical records  [id number]        Your Vitals Were     Pulse Temperature Respirations Pulse Oximetry Breastfeeding? BMI (Body Mass Index)    74 96.3  F (35.7  C) (Tympanic) 24 99% No 23.93 kg/m2       Blood Pressure from Last 3 Encounters:   02/16/18 110/60   10/17/17 104/68   05/05/17 100/70    Weight from Last 3 Encounters:   02/16/18 139 lb 6.4 oz (63.2 kg)   10/17/17 137 lb (62.1 kg)   05/05/17 131 lb 9.6 oz (59.7 kg)                 Today's Medication Changes          These changes are accurate as of 2/16/18  9:01 AM.  If you have any questions, ask your nurse or doctor.               Start taking these medicines.        Dose/Directions    predniSONE 20 MG tablet   Commonly known as:  DELTASONE   Used for:  Mild intermittent asthma with exacerbation   Started by:  Adrianna Watson NP        Dose:  40 mg   Take 2 tablets (40 mg) by mouth daily for 5 days   Quantity:  10 tablet   Refills:  0         These medicines have changed or have updated prescriptions.        Dose/Directions    * albuterol 108 (90 BASE) MCG/ACT Inhaler   Commonly known as:  VENTOLIN HFA   This may have changed:    - Another medication with the same name was changed. Make sure you understand how and when to take each.  - Another medication with the same name was removed. Continue taking this  medication, and follow the directions you see here.   Used for:  Intermittent asthma, uncomplicated, Chronic rhinitis   Changed by:  Adrianna Watson NP        Dose:  2 puff   Inhale 2 puffs into the lungs every 6 hours as needed for shortness of breath / dyspnea or wheezing NEEDS APPT PRIOR TO FURTHER REFILL   Quantity:  54 g   Refills:  0       * albuterol 108 (90 BASE) MCG/ACT Inhaler   Commonly known as:  VENTOLIN HFA   This may have changed:  See the new instructions.   Used for:  Chronic non-seasonal allergic rhinitis, unspecified trigger   Changed by:  Adrianna Watson NP        Inhale 2 puffs into the lungs every 6 hours as needed for shortness of breath / dyspnea   Quantity:  25.5 g   Refills:  3       allopurinol 100 MG tablet   Commonly known as:  ZYLOPRIM   This may have changed:  See the new instructions.   Used for:  Chronic gout of multiple sites, unspecified cause   Changed by:  Adrianna Watson NP        Dose:  100 mg   Take 1 tablet (100 mg) by mouth daily   Quantity:  90 tablet   Refills:  3       buPROPion 150 MG 12 hr tablet   Commonly known as:  WELLBUTRIN SR   This may have changed:  See the new instructions.   Used for:  Encounter for smoking cessation counseling   Changed by:  Adrianna Watson NP        Dose:  150 mg   Take 1 tablet (150 mg) by mouth 2 times daily   Quantity:  180 tablet   Refills:  3       lisinopril 2.5 MG tablet   Commonly known as:  PRINIVIL/Zestril   This may have changed:  See the new instructions.   Used for:  Benign essential hypertension   Changed by:  Adrianna Watson NP        Dose:  2.5 mg   Take 1 tablet (2.5 mg) by mouth daily   Quantity:  90 tablet   Refills:  0       metoprolol tartrate 50 MG tablet   Commonly known as:  LOPRESSOR   This may have changed:  See the new instructions.   Used for:  Benign essential hypertension   Changed by:  Adrianna Watson NP        Take 1 tablet (50 mg) by mouth daily   Quantity:  90 tablet   Refills:  3        montelukast 10 MG tablet   Commonly known as:  SINGULAIR   This may have changed:  See the new instructions.   Used for:  Chronic non-seasonal allergic rhinitis, unspecified trigger   Changed by:  Adrianna Watson NP        Take 1 tablet (10 mg) by mouth At Bedtime   Quantity:  90 tablet   Refills:  3       * Notice:  This list has 2 medication(s) that are the same as other medications prescribed for you. Read the directions carefully, and ask your doctor or other care provider to review them with you.      Stop taking these medicines if you haven't already. Please contact your care team if you have questions.     vitamin B complex with vitamin C Tabs tablet   Stopped by:  Adrianna Watson NP                Where to get your medicines      These medications were sent to [facility]-[address] [address]     Phone:  [phone]     albuterol 108 (90 BASE) MCG/ACT Inhaler    allopurinol 100 MG tablet    buPROPion 150 MG 12 hr tablet    fluticasone 220 MCG/ACT Inhaler    lisinopril 2.5 MG tablet    metoprolol tartrate 50 MG tablet    montelukast 10 MG tablet    predniSONE 20 MG tablet         Some of these will need a paper prescription and others can be bought over the counter.  Ask your nurse if you have questions.     Bring a paper prescription for each of these medications     colchicine 0.6 MG tablet                Primary Care Provider Office Phone # Fax #    Vince Vasquez [phone]       [address] [facility] [address]        Equal Access to Services     JOSE J HELMS : Elias edwardso Solukas, waaxda luqadaha, qaybta kaalmada dallin, cayetano chapa. So [facility] [phone].    ATENCIÓN: Si habla jihanañol, tiene a weathers disposición servicios gratuitos de asistencia lingüística. Natalia al [phone].    We comply with applicable federal civil rights laws and  [place] laws. We do not discriminate on the basis of race, color, national origin, age, disability, sex, sexual orientation, or gender identity.            Thank you!     Thank you for choosing [facility]  for your care. Our goal is always to provide you with excellent care. Hearing back from our patients is one way we can continue to improve our services. Please take a few minutes to complete the written survey that you may receive in the mail after your visit with us. Thank you!             Your Updated Medication List - Protect others around you: Learn how to safely use, store and throw away your medicines at www.disposemymeds.org.          This list is accurate as of 2/16/18  9:01 AM.  Always use your most recent med list.                   Brand Name Dispense Instructions for use Diagnosis    * albuterol 108 (90 BASE) MCG/ACT Inhaler    VENTOLIN HFA    54 g    Inhale 2 puffs into the lungs every 6 hours as needed for shortness of breath / dyspnea or wheezing NEEDS APPT PRIOR TO FURTHER REFILL    Intermittent asthma, uncomplicated, Chronic rhinitis       * albuterol 108 (90 BASE) MCG/ACT Inhaler    VENTOLIN HFA    25.5 g    Inhale 2 puffs into the lungs every 6 hours as needed for shortness of breath / dyspnea    Chronic non-seasonal allergic rhinitis, unspecified trigger       allopurinol 100 MG tablet    ZYLOPRIM    90 tablet    Take 1 tablet (100 mg) by mouth daily    Chronic gout of multiple sites, unspecified cause       buPROPion 150 MG 12 hr tablet    WELLBUTRIN SR    180 tablet    Take 1 tablet (150 mg) by mouth 2 times daily    Encounter for smoking cessation counseling       calcium 600 MG tablet     60 Tab    1 TABLET TWICE DAILY WITH FOOD        colchicine 0.6 MG tablet    COLCRYS    30 tablet    Take 2 tablets at the first sign of flare, take 1 additional tablet one hour later. Then take 1 tablet twice daily until your gout flare is gone    History of gout       fish oil-omega-3  fatty acids 1000 MG capsule     120 Cap    1 CAPSULE DAILY        fluticasone 220 MCG/ACT Inhaler    FLOVENT HFA    12 g    Inhale 2 puffs into the lungs 2 times daily DUE FOR APPT PRIOR TO FURTHER REFILL    Intermittent asthma without complication, unspecified asthma severity       lisinopril 2.5 MG tablet    PRINIVIL/Zestril    90 tablet    Take 1 tablet (2.5 mg) by mouth daily    Benign essential hypertension       metoprolol tartrate 50 MG tablet    LOPRESSOR    90 tablet    Take 1 tablet (50 mg) by mouth daily    Benign essential hypertension       montelukast 10 MG tablet    SINGULAIR    90 tablet    Take 1 tablet (10 mg) by mouth At Bedtime    Chronic non-seasonal allergic rhinitis, unspecified trigger       multivitamin per tablet     100 Tab    ONE DAILY        nicotine 14 MG/24HR 24 hr patch    NICODERM CQ    30 patch    Place 1 patch onto the skin every 24 hours    Encounter for smoking cessation counseling       order for DME     1 Device    Equipment being ordered: Nebulizer    Chest wall pain, Cough       order for DME     2 Units    Equipment being ordered: RAH HOSE to knee 10-15 mmHg calf width 14 cm bilaterally    Varicose veins of both lower extremities       predniSONE 20 MG tablet    DELTASONE    10 tablet    Take 2 tablets (40 mg) by mouth daily for 5 days    Mild intermittent asthma with exacerbation       vitamin D3 1000 UNITS Caps     30 Cap    1 CAPSULE DAILY        * Notice:  This list has 2 medication(s) that are the same as other medications prescribed for you. Read the directions carefully, and ask your doctor or other care provider to review them with you.

## 2018-02-16 NOTE — PATIENT INSTRUCTIONS
Complete and mail FIT (stool) test for colon cancer screening    Start prednisone 40 mg daily in AM for 5 days     Recheck in 1-2 weeks   Day of mammo would work well       Mammogram scheduled for         ___________________________    Cut back on lisinopril to half a tab or 2.5 mg   Refilled medication- will be help on file

## 2018-02-16 NOTE — PROGRESS NOTES
SUBJECTIVE:   Fabi Rizzo is a 63 year old female who presents to clinic today for the following health issues:      Hypertension Follow-up      Outpatient blood pressures are not being checked.    Low Salt Diet: not monitoring salt    BP Readings from Last 6 Encounters:   02/16/18 110/60   10/17/17 104/68   05/05/17 100/70   10/26/16 98/64   10/17/16 98/52   10/07/16 126/88         Asthma Follow-Up    Was ACT completed today?    Yes    ACT Total Scores 2/16/2018   ACT TOTAL SCORE -   ASTHMA ER VISITS -   ASTHMA HOSPITALIZATIONS -   ACT TOTAL SCORE (Goal Greater than or Equal to 20) 15   In the past 12 months, how many times did you visit the emergency room for your asthma without being admitted to the hospital? 0   In the past 12 months, how many times were you hospitalized overnight because of your asthma? 0       Recent asthma triggers that patient is dealing with: upper respiratory infections        Amount of exercise or physical activity: None    Problems taking medications regularly: No    Medication side effects: none    Diet: regular (no restrictions)      Had a URI 1 month ago   Now reports increased shortness of breath some coughing  restarted flovent   This has been going on for 2 weeks   Will get short of breath with going 1 flight of stairs    No fever and otherwise feels very well   Using nebs 2-3 a day without relief        Smoking   4-5 cigarettes- has cut down significantly  Stress is more triggered       Refill on wellbutrin     PHQ-9 SCORE 1/29/2016 5/5/2017 2/16/2018   Total Score - - -   Total Score 1 3 2     LACY-7 SCORE 1/29/2016 5/5/2017 2/16/2018   Total Score - - -   Total Score 1 4 2   mood have been stable         Refill on allopurinol   No recent gout flair         Problem list and histories reviewed & adjusted, as indicated.  Additional history: as documented    Patient Active Problem List   Diagnosis     CARDIOVASCULAR SCREENING; LDL GOAL LESS THAN 130     Benign essential  hypertension     Intermittent asthma     GERD (gastroesophageal reflux disease)     Major depressive disorder, recurrent episode, mild (HCC)     Advanced directives, counseling/discussion     Alcohol use     Chronic low back pain     Spondylolisthesis of lumbar region     Lumbar foraminal stenosis     Chronic rhinitis     Chronic gout of multiple sites, unspecified cause     Tobacco use disorder     Varicose veins of both lower extremities     Past Surgical History:   Procedure Laterality Date     LAMINECTOMY LUMBAR POSTERIOR MICROSCOPIC ONE LEVEL N/A 11/17/2015    Procedure: LAMINECTOMY LUMBAR POSTERIOR MICROSCOPIC ONE LEVEL;  Surgeon: Stephen Vuong MD;  Location: [facility]     SURGICAL HISTORY OF -   09/30/2009    OPEN REDUCTION, INTERNAL FIXATINO OF RIGHT ANKLE (MEDIAL AND LATERL MALLEOLI)       Social History   Substance Use Topics     Smoking status: Current Every Day Smoker     Packs/day: 0.25     Years: 30.00     Types: Cigarettes     Smokeless tobacco: Never Used     Alcohol use Yes     Family History   Problem Relation Age of Onset     C.A.D. Mother      C.A.D. Father      DIABETES Brother      Hypertension Brother      C.A.D. Brother            Reviewed and updated as needed this visit by clinical staff  Tobacco  Allergies  Meds  Soc Hx      Reviewed and updated as needed this visit by Provider         ROS:  Constitutional, HEENT, cardiovascular, pulmonary, gi and gu systems are negative, except as otherwise noted.    OBJECTIVE:                                                    /60 (BP Location: Right arm, Patient Position: Chair, Cuff Size: Adult Regular)  Pulse 74  Temp 96.3  F (35.7  C) (Tympanic)  Resp 24  Wt 139 lb 6.4 oz (63.2 kg)  SpO2 99%  Breastfeeding? No  BMI 23.93 kg/m2  Body mass index is 23.93 kg/(m^2).  GENERAL APPEARANCE: healthy, alert and no distress  HENT: ear canals and TM's normal and nose and mouth without ulcers or lesions  RESP: lungs clear to auscultation - no  rales, rhonchi or wheezes and expiratory wheezes R upper anterior, R upper posterior and L upper posterior  CV: regular rates and rhythm, normal S1 S2, no S3 or S4 and no murmur, click or rub  ABDOMEN: soft, nontender, without hepatosplenomegaly or masses and bowel sounds normal  SKIN: no suspicious lesions or rashes  PSYCH: mentation appears normal and affect normal/bright    Diagnostic test results:  Diagnostic Test Results:  none      ASSESSMENT/PLAN:                                                    1. Mild intermittent asthma with exacerbation  Refilled flovent   She will continue albuterol   Start prednisone 40 mg daily for 5 days   She will see me back in 1-2 weeks for a recheck   - predniSONE (DELTASONE) 20 MG tablet; Take 2 tablets (40 mg) by mouth daily for 5 days  Dispense: 10 tablet; Refill: 0    2. Encounter for smoking cessation counseling  Doing well on cutting down   Discussed emotional triggers   - buPROPion (WELLBUTRIN SR) 150 MG 12 hr tablet; Take 1 tablet (150 mg) by mouth 2 times daily  Dispense: 180 tablet; Refill: 3    3. Chronic non-seasonal allergic rhinitis, unspecified trigger  Stable   Refilled   - montelukast (SINGULAIR) 10 MG tablet; Take 1 tablet (10 mg) by mouth At Bedtime  Dispense: 90 tablet; Refill: 3  - albuterol (VENTOLIN HFA) 108 (90 BASE) MCG/ACT Inhaler; Inhale 2 puffs into the lungs every 6 hours as needed for shortness of breath / dyspnea  Dispense: 25.5 g; Refill: 3    4. Chronic gout of multiple sites, unspecified cause  Stable   refilled  - allopurinol (ZYLOPRIM) 100 MG tablet; Take 1 tablet (100 mg) by mouth daily  Dispense: 90 tablet; Refill: 3    5. Benign essential hypertension  Soft blood pressure   Reduce lisinopril to 2.5 mg   Recheck in 2 weeks   Consider stopping- no other indication for use  - metoprolol tartrate (LOPRESSOR) 50 MG tablet; Take 1 tablet (50 mg) by mouth daily  Dispense: 90 tablet; Refill: 3  - lisinopril (PRINIVIL/ZESTRIL) 2.5 MG tablet; Take 1  tablet (2.5 mg) by mouth daily  Dispense: 90 tablet; Refill: 0    6. Encounter for screening mammogram for breast cancer  Scheduled today before she left the clinic   - *MA Screening Digital Bilateral; Future    Patient Instructions   Complete and mail FIT (stool) test for colon cancer screening    Start prednisone 40 mg daily in AM for 5 days     Recheck in 1-2 weeks   Day of mammo would work well       Mammogram scheduled for         ___________________________    Cut back on lisinopril to half a tab or 2.5 mg   Refilled medication- will be help on file           Adrianna Watson NP  [facility]

## 2018-02-17 ASSESSMENT — ANXIETY QUESTIONNAIRES: GAD7 TOTAL SCORE: 2

## 2018-02-17 ASSESSMENT — ASTHMA QUESTIONNAIRES: ACT_TOTALSCORE: 15

## 2018-02-17 ASSESSMENT — PATIENT HEALTH QUESTIONNAIRE - PHQ9: SUM OF ALL RESPONSES TO PHQ QUESTIONS 1-9: 2

## 2018-02-26 ENCOUNTER — OFFICE VISIT (OUTPATIENT)
Dept: FAMILY MEDICINE | Facility: CLINIC | Age: 63
End: 2018-02-26
Payer: COMMERCIAL

## 2018-02-26 ENCOUNTER — RADIANT APPOINTMENT (OUTPATIENT)
Dept: MAMMOGRAPHY | Facility: CLINIC | Age: 63
End: 2018-02-26
Attending: NURSE PRACTITIONER
Payer: COMMERCIAL

## 2018-02-26 VITALS
BODY MASS INDEX: 23.73 KG/M2 | WEIGHT: 139 LBS | TEMPERATURE: 97.6 F | DIASTOLIC BLOOD PRESSURE: 80 MMHG | HEART RATE: 74 BPM | SYSTOLIC BLOOD PRESSURE: 130 MMHG | HEIGHT: 64 IN | RESPIRATION RATE: 16 BRPM | OXYGEN SATURATION: 99 %

## 2018-02-26 DIAGNOSIS — Z12.31 ENCOUNTER FOR SCREENING MAMMOGRAM FOR BREAST CANCER: ICD-10-CM

## 2018-02-26 DIAGNOSIS — J45.20 MILD INTERMITTENT ASTHMA WITHOUT COMPLICATION: Primary | ICD-10-CM

## 2018-02-26 DIAGNOSIS — I10 BENIGN ESSENTIAL HYPERTENSION: ICD-10-CM

## 2018-02-26 PROCEDURE — 99213 OFFICE O/P EST LOW 20 MIN: CPT | Performed by: NURSE PRACTITIONER

## 2018-02-26 PROCEDURE — 77067 SCR MAMMO BI INCL CAD: CPT | Mod: TC

## 2018-02-26 NOTE — PROGRESS NOTES
SUBJECTIVE:   Fabi Rizzo is a 63 year old female who presents to clinic today for the following health issues:      * Follow up from 2/16/18.  Blood pressure medication was decreased- Lisinopril.    * Recheck breathing.  Feels she is doing better.  Still using albuterol 1-2 times a day  Just had her mammogram         Problem list and histories reviewed & adjusted, as indicated.  Additional history: as documented    Patient Active Problem List   Diagnosis     CARDIOVASCULAR SCREENING; LDL GOAL LESS THAN 130     Benign essential hypertension     Intermittent asthma     GERD (gastroesophageal reflux disease)     Major depressive disorder, recurrent episode, mild (HCC)     Advanced directives, counseling/discussion     Alcohol use     Chronic low back pain     Spondylolisthesis of lumbar region     Lumbar foraminal stenosis     Chronic rhinitis     Chronic gout of multiple sites, unspecified cause     Tobacco use disorder     Varicose veins of both lower extremities     Past Surgical History:   Procedure Laterality Date     LAMINECTOMY LUMBAR POSTERIOR MICROSCOPIC ONE LEVEL N/A 11/17/2015    Procedure: LAMINECTOMY LUMBAR POSTERIOR MICROSCOPIC ONE LEVEL;  Surgeon: Stephen Vuong MD;  Location: [facility]     SURGICAL HISTORY OF -   09/30/2009    OPEN REDUCTION, INTERNAL FIXATINO OF RIGHT ANKLE (MEDIAL AND LATERL MALLEOLI)       Social History   Substance Use Topics     Smoking status: Current Every Day Smoker     Packs/day: 0.25     Years: 30.00     Types: Cigarettes     Smokeless tobacco: Never Used     Alcohol use Yes     Family History   Problem Relation Age of Onset     C.A.D. Mother      C.A.D. Father      DIABETES Brother      Hypertension Brother      C.A.D. Brother            Reviewed and updated as needed this visit by clinical staff  Tobacco  Allergies  Med Hx  Surg Hx  Fam Hx  Soc Hx      Reviewed and updated as needed this visit by Provider  Tobacco         ROS:  Constitutional, HEENT,  "cardiovascular, pulmonary, gi and gu systems are negative, except as otherwise noted.    OBJECTIVE:                                                    /80  Pulse 74  Temp 97.6  F (36.4  C) (Tympanic)  Resp 16  Ht 5' 4\" (1.626 m)  Wt 139 lb (63 kg)  SpO2 99%  BMI 23.86 kg/m2  Body mass index is 23.86 kg/(m^2).  GENERAL APPEARANCE: healthy, alert and no distress  RESP: lungs clear to auscultation - no rales, rhonchi or wheezes  CV: regular rates and rhythm, normal S1 S2, no S3 or S4 and no murmur, click or rub    Diagnostic test results:  Diagnostic Test Results:  none      ASSESSMENT/PLAN:                                                    1. Mild intermittent asthma without complication  Exacerbation resolved  Much improved     2. Benign essential hypertension  Stable     Patient Instructions   Blood pressure stable   Continue the 2.5 mg of lisinopril     Lungs much improved       Adrianna Watson NP  [facility]      "

## 2018-02-26 NOTE — MR AVS SNAPSHOT
After Visit Summary   2/26/2018    Fabi Rizzo    MRN: [id number]           Patient Information     Date Of Birth          1955        Visit Information        Provider Department      2/26/2018 11:20 AM Adrianna Watson NP [facility]        Care Instructions    Blood pressure stable   Continue the 2.5 mg of lisinopril     Lungs much improved           Follow-ups after your visit        Your next 10 appointments already scheduled     Feb 26, 2018 11:45 AM CST   (Arrive by 11:30 AM)   MA SCREENING DIGITAL BILATERAL with PIMA1   [facility]    [address] [facility] [address]   [phone]           Do not use any powder, lotion or deodorant under your arms or on your breast. If you do, we will ask you to remove it before your exam.  Wear comfortable, two-piece clothing.  If you have any allergies, tell your care team.  Bring any previous mammograms from other facilities or have them mailed to the breast center.              Who to contact     If you have questions or need follow up information about today's clinic visit or your schedule please contact [facility] directly at [phone].  Normal or non-critical lab and imaging results will be communicated to you by MyChart, letter or phone within 4 business days after the clinic has received the results. If you do not hear from us within 7 days, please contact the clinic through [employer] or phone. If you have a critical or abnormal lab result, we will notify you by phone as soon as possible.  Submit refill requests through [employer] or call your pharmacy and they will forward the refill request to us. Please allow 3 business days for your refill to be completed.          Additional Information About Your Visit        MyChart Information     [employer] gives you secure access to your electronic health record. If you see a primary care provider, you can also  "send messages to your care team and make appointments. If you have questions, please call your primary care clinic.  If you do not have a primary care provider, please call [phone] and they will assist you.        Care EveryWhere ID     This is your Care EveryWhere ID. This could be used by other organizations to access your [place] medical records  [id number]        Your Vitals Were     Pulse Temperature Respirations Height Pulse Oximetry BMI (Body Mass Index)    74 97.6  F (36.4  C) (Tympanic) 16 5' 4\" (1.626 m) 99% 23.86 kg/m2       Blood Pressure from Last 3 Encounters:   02/26/18 130/80   02/16/18 110/60   10/17/17 104/68    Weight from Last 3 Encounters:   02/26/18 139 lb (63 kg)   02/16/18 139 lb 6.4 oz (63.2 kg)   10/17/17 137 lb (62.1 kg)              Today, you had the following     No orders found for display       Primary Care Provider Office Phone # Fax #    Vince Vasquez [phone]       [address] [address]        Equal Access to Services     [facility] ANALIA : Hadii aad ku hadasho Soomaali, waaxda luqadaha, qaybta kaalmada adeegyada, cayetano sanders haypatsyn kady lau ah. So [facility] [phone].    ATENCIÓN: Si habla español, tiene a weathers disposición servicios gratuitos de asistencia lingüística. Natalia al [phone].    We comply with applicable federal civil rights laws and [place] laws. We do not discriminate on the basis of race, color, national origin, age, disability, sex, sexual orientation, or gender identity.            Thank you!     Thank you for choosing [facility]  for your care. Our goal is always to provide you with excellent care. Hearing back from our patients is one way we can continue to improve our services. Please take a few minutes to complete the written survey that you may receive in the mail after your visit with us. Thank you!             Your Updated Medication List - Protect others around you: " Learn how to safely use, store and throw away your medicines at www.disposemymeds.org.          This list is accurate as of 2/26/18 11:43 AM.  Always use your most recent med list.                   Brand Name Dispense Instructions for use Diagnosis    * albuterol 108 (90 BASE) MCG/ACT Inhaler    VENTOLIN HFA    54 g    Inhale 2 puffs into the lungs every 6 hours as needed for shortness of breath / dyspnea or wheezing NEEDS APPT PRIOR TO FURTHER REFILL    Intermittent asthma, uncomplicated, Chronic rhinitis       * albuterol 108 (90 BASE) MCG/ACT Inhaler    VENTOLIN HFA    25.5 g    Inhale 2 puffs into the lungs every 6 hours as needed for shortness of breath / dyspnea    Chronic non-seasonal allergic rhinitis, unspecified trigger       allopurinol 100 MG tablet    ZYLOPRIM    90 tablet    Take 1 tablet (100 mg) by mouth daily    Chronic gout of multiple sites, unspecified cause       buPROPion 150 MG 12 hr tablet    WELLBUTRIN SR    180 tablet    Take 1 tablet (150 mg) by mouth 2 times daily    Encounter for smoking cessation counseling       calcium 600 MG tablet     60 Tab    1 TABLET TWICE DAILY WITH FOOD        colchicine 0.6 MG tablet    COLCRYS    30 tablet    Take 2 tablets at the first sign of flare, take 1 additional tablet one hour later. Then take 1 tablet twice daily until your gout flare is gone        fish oil-omega-3 fatty acids 1000 MG capsule     120 Cap    1 CAPSULE DAILY        fluticasone 220 MCG/ACT Inhaler    FLOVENT HFA    12 g    Inhale 2 puffs into the lungs 2 times daily DUE FOR APPT PRIOR TO FURTHER REFILL        lisinopril 2.5 MG tablet    PRINIVIL/Zestril    90 tablet    Take 1 tablet (2.5 mg) by mouth daily    Benign essential hypertension       metoprolol tartrate 50 MG tablet    LOPRESSOR    90 tablet    Take 1 tablet (50 mg) by mouth daily    Benign essential hypertension       montelukast 10 MG tablet    SINGULAIR    90 tablet    Take 1 tablet (10 mg) by mouth At Bedtime    Chronic  non-seasonal allergic rhinitis, unspecified trigger       multivitamin per tablet     100 Tab    ONE DAILY        nicotine 14 MG/24HR 24 hr patch    NICODERM CQ    30 patch    Place 1 patch onto the skin every 24 hours    Encounter for smoking cessation counseling       order for DME     1 Device    Equipment being ordered: Nebulizer    Chest wall pain, Cough       order for DME     2 Units    Equipment being ordered: RAH HOSE to knee 10-15 mmHg calf width 14 cm bilaterally    Varicose veins of both lower extremities       vitamin D3 1000 UNITS Caps     30 Cap    1 CAPSULE DAILY        * Notice:  This list has 2 medication(s) that are the same as other medications prescribed for you. Read the directions carefully, and ask your doctor or other care provider to review them with you.

## 2018-02-26 NOTE — NURSING NOTE
"Chief Complaint   Patient presents with     Hypertension     Asthma       Initial BP (!) 130/94 (BP Location: Right arm, Patient Position: Chair, Cuff Size: Adult Regular)  Pulse 74  Temp 97.6  F (36.4  C) (Tympanic)  Resp 16  Ht 5' 4\" (1.626 m)  Wt 139 lb (63 kg)  SpO2 99%  BMI 23.86 kg/m2 Estimated body mass index is 23.86 kg/(m^2) as calculated from the following:    Height as of this encounter: 5' 4\" (1.626 m).    Weight as of this encounter: 139 lb (63 kg).      Health Maintenance that is potentially due pending provider review:  NONE        Is there anyone who you would like to be able to receive your results? No  If yes have patient fill out WILLIAM      "

## 2018-06-21 ENCOUNTER — TELEPHONE (OUTPATIENT)
Dept: FAMILY MEDICINE | Facility: CLINIC | Age: 63
End: 2018-06-21

## 2018-06-21 DIAGNOSIS — Z12.12 SCREENING FOR COLORECTAL CANCER: Primary | ICD-10-CM

## 2018-06-21 DIAGNOSIS — Z12.11 SCREENING FOR COLORECTAL CANCER: Primary | ICD-10-CM

## 2018-06-21 NOTE — TELEPHONE ENCOUNTER
Spoke with pt who states will follow with [facility]. CMP ordered under [facility]. Lab only appt scheduled for tomorrow.  Will also  new FIT kit- new order per [facility].  FRANCHESCA Hicks RN

## 2018-06-21 NOTE — TELEPHONE ENCOUNTER
Patient was told to return for uric acid and other labs, reminder letter was sent to patient on 05/16/2018, patient has still not scheduled an appointment.

## 2018-06-22 DIAGNOSIS — M1A.09X0 CHRONIC GOUT OF MULTIPLE SITES, UNSPECIFIED CAUSE: ICD-10-CM

## 2018-06-22 LAB
ALBUMIN SERPL-MCNC: 3.8 G/DL (ref 3.4–5)
ALP SERPL-CCNC: 88 U/L (ref 40–150)
ALT SERPL W P-5'-P-CCNC: 60 U/L (ref 0–50)
ANION GAP SERPL CALCULATED.3IONS-SCNC: 7 MMOL/L (ref 3–14)
AST SERPL W P-5'-P-CCNC: 87 U/L (ref 0–45)
BILIRUB SERPL-MCNC: 0.3 MG/DL (ref 0.2–1.3)
BUN SERPL-MCNC: 18 MG/DL (ref 7–30)
CALCIUM SERPL-MCNC: 10 MG/DL (ref 8.5–10.1)
CHLORIDE SERPL-SCNC: 104 MMOL/L (ref 94–109)
CO2 SERPL-SCNC: 27 MMOL/L (ref 20–32)
CREAT SERPL-MCNC: 1.02 MG/DL (ref 0.52–1.04)
GFR SERPL CREATININE-BSD FRML MDRD: 55 ML/MIN/1.7M2
GLUCOSE SERPL-MCNC: 88 MG/DL (ref 70–99)
POTASSIUM SERPL-SCNC: 4 MMOL/L (ref 3.4–5.3)
PROT SERPL-MCNC: 7.8 G/DL (ref 6.8–8.8)
SODIUM SERPL-SCNC: 138 MMOL/L (ref 133–144)
URATE SERPL-MCNC: 5.9 MG/DL (ref 2.6–6)

## 2018-06-22 PROCEDURE — 36415 COLL VENOUS BLD VENIPUNCTURE: CPT | Performed by: NURSE PRACTITIONER

## 2018-06-22 PROCEDURE — 84550 ASSAY OF BLOOD/URIC ACID: CPT | Performed by: NURSE PRACTITIONER

## 2018-06-22 PROCEDURE — 80053 COMPREHEN METABOLIC PANEL: CPT | Performed by: NURSE PRACTITIONER

## 2018-06-29 ENCOUNTER — OFFICE VISIT (OUTPATIENT)
Dept: FAMILY MEDICINE | Facility: CLINIC | Age: 63
End: 2018-06-29
Payer: COMMERCIAL

## 2018-06-29 VITALS
BODY MASS INDEX: 23.56 KG/M2 | SYSTOLIC BLOOD PRESSURE: 132 MMHG | TEMPERATURE: 98.1 F | HEIGHT: 64 IN | WEIGHT: 138 LBS | HEART RATE: 80 BPM | DIASTOLIC BLOOD PRESSURE: 84 MMHG | RESPIRATION RATE: 20 BRPM

## 2018-06-29 DIAGNOSIS — J45.20 MILD INTERMITTENT ASTHMA WITHOUT COMPLICATION: Primary | Chronic | ICD-10-CM

## 2018-06-29 DIAGNOSIS — Z87.891 PERSONAL HISTORY OF TOBACCO USE: ICD-10-CM

## 2018-06-29 DIAGNOSIS — J31.0 OTHER CHRONIC RHINITIS: ICD-10-CM

## 2018-06-29 DIAGNOSIS — J45.20 MILD INTERMITTENT ASTHMA WITHOUT COMPLICATION: Chronic | ICD-10-CM

## 2018-06-29 PROCEDURE — 99214 OFFICE O/P EST MOD 30 MIN: CPT | Performed by: NURSE PRACTITIONER

## 2018-06-29 PROCEDURE — G0296 VISIT TO DETERM LDCT ELIG: HCPCS | Performed by: NURSE PRACTITIONER

## 2018-06-29 RX ORDER — FLUTICASONE PROPIONATE AND SALMETEROL XINAFOATE 115; 21 UG/1; UG/1
AEROSOL, METERED RESPIRATORY (INHALATION)
Qty: 36 G | OUTPATIENT
Start: 2018-06-29

## 2018-06-29 RX ORDER — FLUTICASONE PROPIONATE AND SALMETEROL XINAFOATE 115; 21 UG/1; UG/1
2 AEROSOL, METERED RESPIRATORY (INHALATION) 2 TIMES DAILY
Qty: 36 G | Refills: 3 | Status: SHIPPED | OUTPATIENT
Start: 2018-06-29 | End: 2018-06-29

## 2018-06-29 RX ORDER — FLUTICASONE PROPIONATE AND SALMETEROL 113; 14 UG/1; UG/1
1 POWDER, METERED RESPIRATORY (INHALATION) EVERY 12 HOURS
Qty: 3 EACH | Refills: 3 | Status: SHIPPED | OUTPATIENT
Start: 2018-06-29 | End: 2019-08-01

## 2018-06-29 RX ORDER — LORATADINE 10 MG/1
10 TABLET ORAL DAILY
Qty: 90 TABLET | Refills: 1 | Status: SHIPPED | OUTPATIENT
Start: 2018-06-29 | End: 2019-11-01

## 2018-06-29 NOTE — PROGRESS NOTES
SUBJECTIVE:   Fabi Rizzo is a 63 year old female who presents to clinic today for the following health issues:  Unable to afford Flovent Inhaler    Asthma   Was ACT completed today?  No      Respiratory symptoms: 2 days    Cough: Yes-  productive   Wheezing: Yes   Shortness of breath: Yes    Use of short- acting(rescue) inhaler: yes    Taking controlled (daily) meds as prescribed: NA    ER/UC visits or hospital admissions since last visit: none     Recent asthma triggers that patient is dealing with: pollens and humidity   She used to be on Advair, then stopped for some reason  Humidity is really affecting her  No PFT in chart  Recently started on Wellbutrin to help with smoking cessation, down to 5 cigs/day  40+ year history of smoking  She has had a lot of rhinorrhea. Singulair taken, doesn't help much with that... Recommended trial of antihistamine    Lung Cancer Screening Shared Decision Making Visit     Fabi Rizzo is eligible for lung cancer screening on the basis of the information provided in my signed lung cancer screening order.     I have not discussed with patient the risks and benefits of screening for lung cancer with low-dose CT.     The risks include:   radiation exposure: one low dose chest CT has as much ionizing radiation as  about 15 chest x-rays or 6 months of background radiation living in [place]     false positives: 96% of positive findings/nodules are NOT cancer, but some  might still require additional diagnostic evaluation, including biopsy   over-diagnosis: some slow growing cancers that might never have been clinically  significant will be detected and treated unnecessarily     The benefit of early detection of lung cancer is contingent upon adherence to annual screening or more frequent follow up if indicated.     Furthermore, reaping the benefits of screening requires Fabi Rizzo to be willing and physically able to undergo diagnostic procedures, if indicated.  Although no specific guide is available for determining severity of comorbidities, it is reasonable to withhold screening in patients who have greater mortality risk from other diseases.     We did discuss that the only way to prevent lung cancer is to not smoke. Smoking cessation assistance was not offered.    I did not offer risk estimation using a calculator such as this one:    ShouldIScreen    HPI:   PCP:  Vince Vasquez [phone]    Patient Active Problem List   Diagnosis     CARDIOVASCULAR SCREENING; LDL GOAL LESS THAN 130     Benign essential hypertension     Intermittent asthma     GERD (gastroesophageal reflux disease)     Major depressive disorder, recurrent episode, mild (HCC)     Advanced directives, counseling/discussion     Alcohol use     Chronic low back pain     Spondylolisthesis of lumbar region     Lumbar foraminal stenosis     Chronic rhinitis     Chronic gout of multiple sites, unspecified cause     Tobacco use disorder     Varicose veins of both lower extremities     Current Outpatient Prescriptions   Medication     albuterol (VENTOLIN HFA) 108 (90 BASE) MCG/ACT Inhaler     allopurinol (ZYLOPRIM) 100 MG tablet     buPROPion (WELLBUTRIN SR) 150 MG 12 hr tablet     CALCIUM 600 MG PO TABS     fluticasone-salmeterol (ADVAIR-HFA) 115-21 MCG/ACT Inhaler     lisinopril (PRINIVIL/ZESTRIL) 2.5 MG tablet     loratadine (CLARITIN) 10 MG tablet     metoprolol tartrate (LOPRESSOR) 50 MG tablet     montelukast (SINGULAIR) 10 MG tablet     MULTIVITAMINS PO TABS     ORDER FOR DME     colchicine (COLCRYS) 0.6 MG tablet     nicotine (NICODERM CQ) 14 MG/24HR 24 hr patch     order for DME     VITAMIN D3 1000 UNIT PO CAPS     [DISCONTINUED] albuterol (VENTOLIN HFA) 108 (90 BASE) MCG/ACT Inhaler     No current facility-administered medications for this visit.        Health Maintenance Due   Topic Date Due     HIV SCREEN (SYSTEM ASSIGNED)  02/15/1974     HEPATITIS C SCREENING  02/15/1974     COLON  "CANCER SCREEN (SYSTEM ASSIGNED)  09/01/2016     ADVANCE DIRECTIVE PLANNING Q5 YRS  09/21/2017       Reviewed and updated:  Tobacco  Allergies  Meds  Med Hx  Surg Hx  Fam Hx  Soc Hx     ROS:  Constitutional, HEENT, cardiovascular, pulmonary, gi and gu systems are negative, except as otherwise noted.    PHYSICAL EXAM:   /84 (BP Location: Right arm, Patient Position: Sitting, Cuff Size: Adult Regular)  Pulse 80  Temp 98.1  F (36.7  C) (Tympanic)  Resp 20  Ht 5' 4\" (1.626 m)  Wt 138 lb (62.6 kg)  BMI 23.69 kg/m2  Body mass index is 23.69 kg/(m^2).  GENERAL APPEARANCE: healthy, alert and no distress  HENT: ear canals and TM's normal, nose and mouth without ulcers or lesions and clear rhinorrhea posterior orpharynx  RESP: lungs clear to auscultation - no rales, rhonchi or wheezes and productive cough  CV: regular rates and rhythm, normal S1 S2, no S3 or S4 and no murmur, click or rub  PSYCH: mentation appears normal and affect normal/bright    ASSESSMENT & PLAN:   1. Mild intermittent asthma without complication  Chronic, worsened  - fluticasone-salmeterol (ADVAIR-HFA) 115-21 MCG/ACT Inhaler; Inhale 2 puffs into the lungs 2 times daily  Dispense: 36 g; Refill: 3  - D/C Flovent  - General PFT Lab (Please always keep checked); Future- they should call you to schedule  - Pulmonary Function Test; Future    D/C Advair HFA d/t high cost. Spoke with Pharmacist, AirDuo is covered. Sent new Rx for:   Fluticasone-Salmeterol (AIRDUO RESPICLICK) 113-14 MCG/ACT AEPB inhaler 3 each 3 6/29/2018  --      Sig - Route: Inhale 1 puff into the lungs every 12 hours - Inhalation         2. Personal history of tobacco use  Chronic, improving  - Prof fee: Shared Decisionmaking for Lung Cancer Screening  - CT Chest Lung Cancer Scrn Low Dose wo; Future  - Okay for Smoking Cessation Study (PLUTO) to Contact Patient  - General PFT Lab (Please always keep checked); Future  - Pulmonary Function Test; Future    3. Other chronic " rhinitis  Chronic, uncontrolled  - loratadine (CLARITIN) 10 MG tablet; Take 1 tablet (10 mg) by mouth daily  Dispense: 90 tablet; Refill: 1      Lung Cancer Screening   Frequently Asked Questions  If you are at high-risk for lung cancer, getting screened with low-dose computed tomography (LDCT) every year can help save your life. This handout offers answers to some of the most common questions about lung cancer screening. If you have other questions, please call [phone] ([phone]).     What is it?  Lung cancer screening uses special X-ray technology to create an image of your lung tissue. The exam is quick and easy and takes less than 10 seconds. We don t give you any medicine or use any needles. You can eat before and after the exam. You don t need to change your clothes as long as the clothing on your chest doesn t contain metal. But, you do need to be able to hold your breath for at least 6 seconds during the exam.    What is the goal of lung cancer screening?  The goal of lung cancer screening is to save lives. Many times, lung cancer is not found until a person starts having physical symptoms. Lung cancer screening can help detect lung cancer in the earliest stages when it may be easier to treat.    Who should be screened for lung cancer?  We suggest lung cancer screening for anyone who is at high-risk for lung cancer. You are in the high-risk group if you:      are between the ages of 55 and 79, and    have smoked at least 1 pack of cigarettes a day for 30 or more years, and    still smoke or have quit within the past 15 years.    However, if you have a new cough or shortness of breath, you should talk to your doctor before being screened.    Some national lung health advocacy groups also recommend screening for people ages 50 to 79 who have smoked an average of 1 pack of cigarettes a day for 20 years. They must also have at least 1 other risk factor for lung cancer, not including exposure to  secondhand smoke. Other risk factors are having had cancer in the past, emphysema, pulmonary fibrosis, COPD, a family history of lung cancer, or exposure to certain materials such as arsenic, asbestos, beryllium, cadmium, chromium, diesel fumes, nickel, radon or silica. Your care team can help you know if you have one of these risk factors.     Why does it matter if I have symptoms?  Certain symptoms can be a sign that you have a condition in your lungs that should be checked and treated by your doctor. These symptoms include fever, chest pain, a new or changing cough, shortness of breath that you have never felt before, coughing up blood or unexplained weight loss. Having any of these symptoms can greatly affect the results of lung cancer screening.       Should all smokers get an LDCT lung cancer screening exam?  It depends. Lung cancer screening is for a very specific group of men and women who have a history of heavy smoking over a long period of time (see  Who should be screened for lung cancer  above).  I am in the high-risk group, but have been diagnosed with cancer in the past. Is LDCT lung cancer screening right for me?  In some cases, you should not have LDCT lung screening, such as when your doctor is already following your cancer with CT scan studies. Your doctor will help you decide if LDCT lung screening is right for you.  Do I need to have a screening exam every year?  Yes. If you are in the high-risk group described earlier, you should get an LDCT lung cancer screening exam every year until you are 79, or are no longer willing or able to undergo screening and possible procedures to diagnose and treat lung cancer.  How effective is LDCT at preventing death from lung cancer?  Studies have shown that LDCT lung cancer screening can lower the risk of death from lung cancer by 20 percent in people who are at high-risk.  What are the risks?  There are some risks and limitations of LDCT lung cancer  screening. We want to make sure you understand the risks and benefits, so please let us know if you have any questions. Your doctor may want to talk with you more about these risks.    Radiation exposure: As with any exam that uses radiation, there is a very small increased risk of cancer. The amount of radiation in LDCT is small--about the same amount a person would get from a mammogram. Your doctor orders the exam when he or she feels the potential benefits outweigh the risks.    False negatives: No test is perfect, including LDCT. It is possible that you may have a medical condition, including lung cancer, that is not found during your exam. This is called a false negative result.    False positives and more testing: LDCT very often finds something in the lung that could be cancer, but in fact is not. This is called a false positive result. False positive tests often cause anxiety. To make sure these findings are not cancer, you may need to have more tests. These tests will be done only if you give us permission. Sometimes patients need a treatment that can have side effects, such as a biopsy. For more information on false positives, see  What can I expect from the results?     Findings not related to lung cancer: Your LDCT exam also takes pictures of areas of your body next to your lungs. In a very small number of cases, the CT scan will show an abnormal finding in one of these areas, such as your kidneys, adrenal glands, liver or thyroid. This finding may not be serious, but you may need more tests. Your doctor can help you decide what other tests you may need, if any.  What can I expect from the results?  About 1 out of 4 LDCT exams will find something that may need more tests. Most of the time, these findings are lung nodules. Lung nodules are very small collections of tissue in the lung. These nodules are very common, and the vast majority--more than 97 percent--are not cancer (benign). Most are normal lymph  nodes or small areas of scarring from past infections.  But, if a small lung nodule is found to be cancer, the cancer can be cured more than 90 percent of the time. To know if the nodule is cancer, we may need to get more images before your next yearly screening exam. If the nodule has suspicious features (for example, it is large, has an odd shape or grows over time), we will refer you to a specialist for further testing.  Will my doctor also get the results?  Yes. Your doctor will get a copy of your results.  Is it okay to keep smoking now that there s a cancer screening exam?  No. Tobacco is one of the strongest cancer-causing agents. It causes not only lung cancer, but other cancers and cardiovascular (heart) diseases as well. The damage caused by smoking builds over time. This means that the longer you smoke, the higher your risk of disease. While it is never too late to quit, the sooner you quit, the better.  Where can I find help to quit smoking?  The best way to prevent lung cancer is to stop smoking. If you have already quit smoking, congratulations and keep it up! For help on quitting smoking, please call [employer] at 9-336-205-TWDH ([phone]) or the American [employer] Society at 1-553.269.1183 to find local resources near you.  One-on-one health coaching:  If you d prefer to work individually with a health care provider on tobacco cessation, we offer:      Medication Therapy Management:  Our specially trained pharmacists work closely with you and your doctor to help you quit smoking.  Call [phone] or [phone] (toll free).     Can Do: Health coaching offered by [facility].  [email or web address]    Pulmonary Function Tests  Pulmonary function tests (also called lung function tests) help measure how well your lungs are working. The tests measure the amount of air you breathe out (exhale) and how long it takes for you to exhale completely. These tests are done to diagnose lung conditions  such as asthma and COPD (chronic obstructive pulmonary disease). They may be done before and after you take certain medicines. They may also be used to find out if your shortness of breath gets worse with exercise. Over time, pulmonary function tests can help you and your healthcare providers see how well your treatment is working.     Spirometry measures how much air you can take into your lungs and how fast you can blow the air out.   Your experience  A complete pulmonary function test has 3 parts. You may be given the entire test or only certain parts. The entire test is painless and can last 45 to 90 minutes. If you get tired, you can take a break between test sections.  Before your test  Follow any instructions you are given to prepare for the test. Otherwise, your test may be canceled.    Stop smoking for at least 1 hour before the test, or as directed.    Don't drink alcohol for at least 4 hours before the test.    Ask your healthcare provider if you should stop taking your breathing medicine 4 to 24 hours before the test.    Don't have caffeine and eat only a light meal. Also limit the amount of fluids you drink.    Wear loose clothes that don t restrict your breathing.  Tell the healthcare provider if you have any of these symptoms during the test:    Sore mouth    Chest, arm, or jaw pain    Tiredness (fatigue) or dizziness    Severe shortness of breath  During your test  The healthcare provider will  you during your test. Depending on how many parts of the test you have, you may sit in a chair and breathe through a mouthpiece. Or you may sit in a clear plastic box that looks like a phone gamez. You will wear nose clips so you only breathe through your mouth.    During spirometry, you hold your breath and blow it out fast. Spirometry is repeated at least 3 times to measure your best effort.    During diffusion, you hold your breath for 10 seconds. The test measures how well your lungs move air into  your blood.    During lung volume, you breathe in different mixtures of air. How much air you inhale and exhale is measured. The amount of air that stays in your lungs is also measured.  After your test  After the test, you can return to your normal diet, activity, and medicines. If you were asked to skip medicines before the test, ask if you should take them now. Your healthcare provider will discuss the test results with you at your next visit.  Words you may hear  Pulmonary function tests measure how much air you can exhale, and how quickly. There are several types of pulmonary function graphs that show data from the tests. Some of the things that tests measure include:    FVC (forced vital capacity). This is the total amount of air you can exhale in a single, prolonged breath.    FEV1 (forced expiratory volume in one second). This is the amount of air you exhale in the first second. FEV1 is often expressed as a percentage of FVC.    FEV1/FVC. This is the amount of air exhaled in the first second compared with the total amount of air exhaled. It s given as a fraction (ratio) or a percentage. In general, the higher the FEV1/FVC, the better.    PEF (peak expiratory flow). This is a measure of how fast you can exhale. It can be tested with spirometry or a peak flow meter.   Date Last Reviewed: 12/1/2016 2000-2017 [employer]. [address] [place] [id number]. All rights reserved. This information is not intended as a substitute for professional medical care. Always follow your healthcare professional's instructions.          Risks, benefits, side effects and rationale for treatment plan fully discussed with the patient and understanding expressed.    Terri Hill, FNCHELI-BC  [facility]

## 2018-06-29 NOTE — NURSING NOTE
"Chief Complaint   Patient presents with     Asthma       Initial /84 (BP Location: Right arm, Patient Position: Sitting, Cuff Size: Adult Regular)  Pulse 80  Temp 98.1  F (36.7  C) (Tympanic)  Resp 20  Ht 5' 4\" (1.626 m)  Wt 138 lb (62.6 kg)  BMI 23.69 kg/m2 Estimated body mass index is 23.69 kg/(m^2) as calculated from the following:    Height as of this encounter: 5' 4\" (1.626 m).    Weight as of this encounter: 138 lb (62.6 kg).      Health Maintenance that is potentially due pending provider review:  Colonoscopy/FIT    Has FIT at home     Is there anyone who you would like to be able to receive your results? No  If yes have patient fill out WILLIAM    "

## 2018-06-29 NOTE — PATIENT INSTRUCTIONS
1. Mild intermittent asthma without complication  Chronic, worsened  - fluticasone-salmeterol (ADVAIR-HFA) 115-21 MCG/ACT Inhaler; Inhale 2 puffs into the lungs 2 times daily  Dispense: 36 g; Refill: 3  - D/C Flovent  - General PFT Lab (Please always keep checked); Future- they should call you to schedule  - Pulmonary Function Test; Future    2. Personal history of tobacco use  Chronic, improving  - Prof fee: Shared Decisionmaking for Lung Cancer Screening  - CT Chest Lung Cancer Scrn Low Dose wo; Future  - Okay for Smoking Cessation Study (PLUTO) to Contact Patient  - General PFT Lab (Please always keep checked); Future  - Pulmonary Function Test; Future    3. Other chronic rhinitis  Chronic, uncontrolled  - loratadine (CLARITIN) 10 MG tablet; Take 1 tablet (10 mg) by mouth daily  Dispense: 90 tablet; Refill: 1      Lung Cancer Screening   Frequently Asked Questions  If you are at high-risk for lung cancer, getting screened with low-dose computed tomography (LDCT) every year can help save your life. This handout offers answers to some of the most common questions about lung cancer screening. If you have other questions, please call [phone] ([phone]).     What is it?  Lung cancer screening uses special X-ray technology to create an image of your lung tissue. The exam is quick and easy and takes less than 10 seconds. We don t give you any medicine or use any needles. You can eat before and after the exam. You don t need to change your clothes as long as the clothing on your chest doesn t contain metal. But, you do need to be able to hold your breath for at least 6 seconds during the exam.    What is the goal of lung cancer screening?  The goal of lung cancer screening is to save lives. Many times, lung cancer is not found until a person starts having physical symptoms. Lung cancer screening can help detect lung cancer in the earliest stages when it may be easier to treat.    Who should be screened  for lung cancer?  We suggest lung cancer screening for anyone who is at high-risk for lung cancer. You are in the high-risk group if you:      are between the ages of 55 and 79, and    have smoked at least 1 pack of cigarettes a day for 30 or more years, and    still smoke or have quit within the past 15 years.    However, if you have a new cough or shortness of breath, you should talk to your doctor before being screened.    Some national lung health advocacy groups also recommend screening for people ages 50 to 79 who have smoked an average of 1 pack of cigarettes a day for 20 years. They must also have at least 1 other risk factor for lung cancer, not including exposure to secondhand smoke. Other risk factors are having had cancer in the past, emphysema, pulmonary fibrosis, COPD, a family history of lung cancer, or exposure to certain materials such as arsenic, asbestos, beryllium, cadmium, chromium, diesel fumes, nickel, radon or silica. Your care team can help you know if you have one of these risk factors.     Why does it matter if I have symptoms?  Certain symptoms can be a sign that you have a condition in your lungs that should be checked and treated by your doctor. These symptoms include fever, chest pain, a new or changing cough, shortness of breath that you have never felt before, coughing up blood or unexplained weight loss. Having any of these symptoms can greatly affect the results of lung cancer screening.       Should all smokers get an LDCT lung cancer screening exam?  It depends. Lung cancer screening is for a very specific group of men and women who have a history of heavy smoking over a long period of time (see  Who should be screened for lung cancer  above).  I am in the high-risk group, but have been diagnosed with cancer in the past. Is LDCT lung cancer screening right for me?  In some cases, you should not have LDCT lung screening, such as when your doctor is already following your cancer  with CT scan studies. Your doctor will help you decide if LDCT lung screening is right for you.  Do I need to have a screening exam every year?  Yes. If you are in the high-risk group described earlier, you should get an LDCT lung cancer screening exam every year until you are 79, or are no longer willing or able to undergo screening and possible procedures to diagnose and treat lung cancer.  How effective is LDCT at preventing death from lung cancer?  Studies have shown that LDCT lung cancer screening can lower the risk of death from lung cancer by 20 percent in people who are at high-risk.  What are the risks?  There are some risks and limitations of LDCT lung cancer screening. We want to make sure you understand the risks and benefits, so please let us know if you have any questions. Your doctor may want to talk with you more about these risks.    Radiation exposure: As with any exam that uses radiation, there is a very small increased risk of cancer. The amount of radiation in LDCT is small--about the same amount a person would get from a mammogram. Your doctor orders the exam when he or she feels the potential benefits outweigh the risks.    False negatives: No test is perfect, including LDCT. It is possible that you may have a medical condition, including lung cancer, that is not found during your exam. This is called a false negative result.    False positives and more testing: LDCT very often finds something in the lung that could be cancer, but in fact is not. This is called a false positive result. False positive tests often cause anxiety. To make sure these findings are not cancer, you may need to have more tests. These tests will be done only if you give us permission. Sometimes patients need a treatment that can have side effects, such as a biopsy. For more information on false positives, see  What can I expect from the results?     Findings not related to lung cancer: Your LDCT exam also takes pictures  of areas of your body next to your lungs. In a very small number of cases, the CT scan will show an abnormal finding in one of these areas, such as your kidneys, adrenal glands, liver or thyroid. This finding may not be serious, but you may need more tests. Your doctor can help you decide what other tests you may need, if any.  What can I expect from the results?  About 1 out of 4 LDCT exams will find something that may need more tests. Most of the time, these findings are lung nodules. Lung nodules are very small collections of tissue in the lung. These nodules are very common, and the vast majority--more than 97 percent--are not cancer (benign). Most are normal lymph nodes or small areas of scarring from past infections.  But, if a small lung nodule is found to be cancer, the cancer can be cured more than 90 percent of the time. To know if the nodule is cancer, we may need to get more images before your next yearly screening exam. If the nodule has suspicious features (for example, it is large, has an odd shape or grows over time), we will refer you to a specialist for further testing.  Will my doctor also get the results?  Yes. Your doctor will get a copy of your results.  Is it okay to keep smoking now that there s a cancer screening exam?  No. Tobacco is one of the strongest cancer-causing agents. It causes not only lung cancer, but other cancers and cardiovascular (heart) diseases as well. The damage caused by smoking builds over time. This means that the longer you smoke, the higher your risk of disease. While it is never too late to quit, the sooner you quit, the better.  Where can I find help to quit smoking?  The best way to prevent lung cancer is to stop smoking. If you have already quit smoking, congratulations and keep it up! For help on quitting smoking, please call [facility] at 9-019-548-BXYA ([phone] or the [employer] at 1-309.584.3867 to find local resources near you.  One-on-one health  coaching:  If you d prefer to work individually with a health care provider on tobacco cessation, we offer:      Medication Therapy Management:  Our specially trained pharmacists work closely with you and your doctor to help you quit smoking.  Call [phone] or [phone] (toll free).     Can Do: Health coaching offered by [facility].  [email or web address]    Pulmonary Function Tests  Pulmonary function tests (also called lung function tests) help measure how well your lungs are working. The tests measure the amount of air you breathe out (exhale) and how long it takes for you to exhale completely. These tests are done to diagnose lung conditions such as asthma and COPD (chronic obstructive pulmonary disease). They may be done before and after you take certain medicines. They may also be used to find out if your shortness of breath gets worse with exercise. Over time, pulmonary function tests can help you and your healthcare providers see how well your treatment is working.     Spirometry measures how much air you can take into your lungs and how fast you can blow the air out.   Your experience  A complete pulmonary function test has 3 parts. You may be given the entire test or only certain parts. The entire test is painless and can last 45 to 90 minutes. If you get tired, you can take a break between test sections.  Before your test  Follow any instructions you are given to prepare for the test. Otherwise, your test may be canceled.    Stop smoking for at least 1 hour before the test, or as directed.    Don't drink alcohol for at least 4 hours before the test.    Ask your healthcare provider if you should stop taking your breathing medicine 4 to 24 hours before the test.    Don't have caffeine and eat only a light meal. Also limit the amount of fluids you drink.    Wear loose clothes that don t restrict your breathing.  Tell the healthcare provider if you have any of these symptoms during  the test:    Sore mouth    Chest, arm, or jaw pain    Tiredness (fatigue) or dizziness    Severe shortness of breath  During your test  The healthcare provider will  you during your test. Depending on how many parts of the test you have, you may sit in a chair and breathe through a mouthpiece. Or you may sit in a clear plastic box that looks like a phone gamez. You will wear nose clips so you only breathe through your mouth.    During spirometry, you hold your breath and blow it out fast. Spirometry is repeated at least 3 times to measure your best effort.    During diffusion, you hold your breath for 10 seconds. The test measures how well your lungs move air into your blood.    During lung volume, you breathe in different mixtures of air. How much air you inhale and exhale is measured. The amount of air that stays in your lungs is also measured.  After your test  After the test, you can return to your normal diet, activity, and medicines. If you were asked to skip medicines before the test, ask if you should take them now. Your healthcare provider will discuss the test results with you at your next visit.  Words you may hear  Pulmonary function tests measure how much air you can exhale, and how quickly. There are several types of pulmonary function graphs that show data from the tests. Some of the things that tests measure include:    FVC (forced vital capacity). This is the total amount of air you can exhale in a single, prolonged breath.    FEV1 (forced expiratory volume in one second). This is the amount of air you exhale in the first second. FEV1 is often expressed as a percentage of FVC.    FEV1/FVC. This is the amount of air exhaled in the first second compared with the total amount of air exhaled. It s given as a fraction (ratio) or a percentage. In general, the higher the FEV1/FVC, the better.    PEF (peak expiratory flow). This is a measure of how fast you can exhale. It can be tested with spirometry  or a peak flow meter.   Date Last Reviewed: 12/1/2016 2000-2017 [employer]. [address] [place] [address], [place] [address]. All rights reserved. This information is not intended as a substitute for professional medical care. Always follow your healthcare professional's instructions.

## 2018-06-29 NOTE — MR AVS SNAPSHOT
After Visit Summary   6/29/2018    Fabi Rizzo    MRN: [id number]           Patient Information     Date Of Birth          1955        Visit Information        Provider Department      6/29/2018 1:40 PM Terri Hill, CNP [facility]        Today's Diagnoses     Mild intermittent asthma without complication    -  1    Personal history of tobacco use        Other chronic rhinitis          Care Instructions    1. Mild intermittent asthma without complication  Chronic, worsened  - fluticasone-salmeterol (ADVAIR-HFA) 115-21 MCG/ACT Inhaler; Inhale 2 puffs into the lungs 2 times daily  Dispense: 36 g; Refill: 3  - D/C Flovent  - General PFT Lab (Please always keep checked); Future- they should call you to schedule  - Pulmonary Function Test; Future    2. Personal history of tobacco use  Chronic, improving  - Prof fee: Shared Decisionmaking for Lung Cancer Screening  - CT Chest Lung Cancer Scrn Low Dose wo; Future  - Okay for Smoking Cessation Study (PLUTO) to Contact Patient  - General PFT Lab (Please always keep checked); Future  - Pulmonary Function Test; Future    3. Other chronic rhinitis  Chronic, uncontrolled  - loratadine (CLARITIN) 10 MG tablet; Take 1 tablet (10 mg) by mouth daily  Dispense: 90 tablet; Refill: 1      Lung Cancer Screening   Frequently Asked Questions  If you are at high-risk for lung cancer, getting screened with low-dose computed tomography (LDCT) every year can help save your life. This handout offers answers to some of the most common questions about lung cancer screening. If you have other questions, please call [phone] ([phone]).     What is it?  Lung cancer screening uses special X-ray technology to create an image of your lung tissue. The exam is quick and easy and takes less than 10 seconds. We don t give you any medicine or use any needles. You can eat before and after the exam. You don t need to change your clothes as  long as the clothing on your chest doesn t contain metal. But, you do need to be able to hold your breath for at least 6 seconds during the exam.    What is the goal of lung cancer screening?  The goal of lung cancer screening is to save lives. Many times, lung cancer is not found until a person starts having physical symptoms. Lung cancer screening can help detect lung cancer in the earliest stages when it may be easier to treat.    Who should be screened for lung cancer?  We suggest lung cancer screening for anyone who is at high-risk for lung cancer. You are in the high-risk group if you:      are between the ages of 55 and 79, and    have smoked at least 1 pack of cigarettes a day for 30 or more years, and    still smoke or have quit within the past 15 years.    However, if you have a new cough or shortness of breath, you should talk to your doctor before being screened.    Some national lung health advocacy groups also recommend screening for people ages 50 to 79 who have smoked an average of 1 pack of cigarettes a day for 20 years. They must also have at least 1 other risk factor for lung cancer, not including exposure to secondhand smoke. Other risk factors are having had cancer in the past, emphysema, pulmonary fibrosis, COPD, a family history of lung cancer, or exposure to certain materials such as arsenic, asbestos, beryllium, cadmium, chromium, diesel fumes, nickel, radon or silica. Your care team can help you know if you have one of these risk factors.     Why does it matter if I have symptoms?  Certain symptoms can be a sign that you have a condition in your lungs that should be checked and treated by your doctor. These symptoms include fever, chest pain, a new or changing cough, shortness of breath that you have never felt before, coughing up blood or unexplained weight loss. Having any of these symptoms can greatly affect the results of lung cancer screening.       Should all smokers get an LDCT lung  cancer screening exam?  It depends. Lung cancer screening is for a very specific group of men and women who have a history of heavy smoking over a long period of time (see  Who should be screened for lung cancer  above).  I am in the high-risk group, but have been diagnosed with cancer in the past. Is LDCT lung cancer screening right for me?  In some cases, you should not have LDCT lung screening, such as when your doctor is already following your cancer with CT scan studies. Your doctor will help you decide if LDCT lung screening is right for you.  Do I need to have a screening exam every year?  Yes. If you are in the high-risk group described earlier, you should get an LDCT lung cancer screening exam every year until you are 79, or are no longer willing or able to undergo screening and possible procedures to diagnose and treat lung cancer.  How effective is LDCT at preventing death from lung cancer?  Studies have shown that LDCT lung cancer screening can lower the risk of death from lung cancer by 20 percent in people who are at high-risk.  What are the risks?  There are some risks and limitations of LDCT lung cancer screening. We want to make sure you understand the risks and benefits, so please let us know if you have any questions. Your doctor may want to talk with you more about these risks.    Radiation exposure: As with any exam that uses radiation, there is a very small increased risk of cancer. The amount of radiation in LDCT is small--about the same amount a person would get from a mammogram. Your doctor orders the exam when he or she feels the potential benefits outweigh the risks.    False negatives: No test is perfect, including LDCT. It is possible that you may have a medical condition, including lung cancer, that is not found during your exam. This is called a false negative result.    False positives and more testing: LDCT very often finds something in the lung that could be cancer, but in fact is  not. This is called a false positive result. False positive tests often cause anxiety. To make sure these findings are not cancer, you may need to have more tests. These tests will be done only if you give us permission. Sometimes patients need a treatment that can have side effects, such as a biopsy. For more information on false positives, see  What can I expect from the results?     Findings not related to lung cancer: Your LDCT exam also takes pictures of areas of your body next to your lungs. In a very small number of cases, the CT scan will show an abnormal finding in one of these areas, such as your kidneys, adrenal glands, liver or thyroid. This finding may not be serious, but you may need more tests. Your doctor can help you decide what other tests you may need, if any.  What can I expect from the results?  About 1 out of 4 LDCT exams will find something that may need more tests. Most of the time, these findings are lung nodules. Lung nodules are very small collections of tissue in the lung. These nodules are very common, and the vast majority--more than 97 percent--are not cancer (benign). Most are normal lymph nodes or small areas of scarring from past infections.  But, if a small lung nodule is found to be cancer, the cancer can be cured more than 90 percent of the time. To know if the nodule is cancer, we may need to get more images before your next yearly screening exam. If the nodule has suspicious features (for example, it is large, has an odd shape or grows over time), we will refer you to a specialist for further testing.  Will my doctor also get the results?  Yes. Your doctor will get a copy of your results.  Is it okay to keep smoking now that there s a cancer screening exam?  No. Tobacco is one of the strongest cancer-causing agents. It causes not only lung cancer, but other cancers and cardiovascular (heart) diseases as well. The damage caused by smoking builds over time. This means that the  longer you smoke, the higher your risk of disease. While it is never too late to quit, the sooner you quit, the better.  Where can I find help to quit smoking?  The best way to prevent lung cancer is to stop smoking. If you have already quit smoking, congratulations and keep it up! For help on quitting smoking, please call [facility] at 6-323-536-ZKEB ([phone]) or the American Cancer Society at 1-248.965.9532 to find local resources near you.  One-on-one health coaching:  If you d prefer to work individually with a health care provider on tobacco cessation, we offer:      Medication Therapy Management:  Our specially trained pharmacists work closely with you and your doctor to help you quit smoking.  Call [phone] or [phone] (toll free).     Can Do: Health coaching offered by [facility].  [email or web address]    Pulmonary Function Tests  Pulmonary function tests (also called lung function tests) help measure how well your lungs are working. The tests measure the amount of air you breathe out (exhale) and how long it takes for you to exhale completely. These tests are done to diagnose lung conditions such as asthma and COPD (chronic obstructive pulmonary disease). They may be done before and after you take certain medicines. They may also be used to find out if your shortness of breath gets worse with exercise. Over time, pulmonary function tests can help you and your healthcare providers see how well your treatment is working.     Spirometry measures how much air you can take into your lungs and how fast you can blow the air out.   Your experience  A complete pulmonary function test has 3 parts. You may be given the entire test or only certain parts. The entire test is painless and can last 45 to 90 minutes. If you get tired, you can take a break between test sections.  Before your test  Follow any instructions you are given to prepare for the test. Otherwise, your test may be  canceled.    Stop smoking for at least 1 hour before the test, or as directed.    Don't drink alcohol for at least 4 hours before the test.    Ask your healthcare provider if you should stop taking your breathing medicine 4 to 24 hours before the test.    Don't have caffeine and eat only a light meal. Also limit the amount of fluids you drink.    Wear loose clothes that don t restrict your breathing.  Tell the healthcare provider if you have any of these symptoms during the test:    Sore mouth    Chest, arm, or jaw pain    Tiredness (fatigue) or dizziness    Severe shortness of breath  During your test  The healthcare provider will  you during your test. Depending on how many parts of the test you have, you may sit in a chair and breathe through a mouthpiece. Or you may sit in a clear plastic box that looks like a phone gamez. You will wear nose clips so you only breathe through your mouth.    During spirometry, you hold your breath and blow it out fast. Spirometry is repeated at least 3 times to measure your best effort.    During diffusion, you hold your breath for 10 seconds. The test measures how well your lungs move air into your blood.    During lung volume, you breathe in different mixtures of air. How much air you inhale and exhale is measured. The amount of air that stays in your lungs is also measured.  After your test  After the test, you can return to your normal diet, activity, and medicines. If you were asked to skip medicines before the test, ask if you should take them now. Your healthcare provider will discuss the test results with you at your next visit.  Words you may hear  Pulmonary function tests measure how much air you can exhale, and how quickly. There are several types of pulmonary function graphs that show data from the tests. Some of the things that tests measure include:    FVC (forced vital capacity). This is the total amount of air you can exhale in a single, prolonged  breath.    FEV1 (forced expiratory volume in one second). This is the amount of air you exhale in the first second. FEV1 is often expressed as a percentage of FVC.    FEV1/FVC. This is the amount of air exhaled in the first second compared with the total amount of air exhaled. It s given as a fraction (ratio) or a percentage. In general, the higher the FEV1/FVC, the better.    PEF (peak expiratory flow). This is a measure of how fast you can exhale. It can be tested with spirometry or a peak flow meter.   Date Last Reviewed: 12/1/2016 2000-2017 [employer]. [address] [place] [address]. All rights reserved. This information is not intended as a substitute for professional medical care. Always follow your healthcare professional's instructions.                Follow-ups after your visit        Future tests that were ordered for you today     Open Future Orders        Priority Expected Expires Ordered    General PFT Lab (Please always keep checked) Routine  6/29/2019 6/29/2018    Pulmonary Function Test Routine  6/29/2019 6/29/2018    CT Chest Lung Cancer Scrn Low Dose wo Routine  6/29/2019 6/29/2018            Who to contact     If you have questions or need follow up information about today's clinic visit or your schedule please contact [facility] directly at [phone].  Normal or non-critical lab and imaging results will be communicated to you by MyChart, letter or phone within 4 business days after the clinic has received the results. If you do not hear from us within 7 days, please contact the clinic through [employer] or phone. If you have a critical or abnormal lab result, we will notify you by phone as soon as possible.  Submit refill requests through [employer] or call your pharmacy and they will forward the refill request to us. Please allow 3 business days for your refill to be completed.          Additional Information About Your Visit        [employer] Information   "   [employer] gives you secure access to your electronic health record. If you see a primary care provider, you can also send messages to your care team and make appointments. If you have questions, please call your primary care clinic.  If you do not have a primary care provider, please call [phone] and they will assist you.        Care EveryWhere ID     This is your Care EveryWhere ID. This could be used by other organizations to access your [facility] medical records  [id number]        Your Vitals Were     Pulse Temperature Respirations Height BMI (Body Mass Index)       80 98.1  F (36.7  C) (Tympanic) 20 5' 4\" (1.626 m) 23.69 kg/m2        Blood Pressure from Last 3 Encounters:   06/29/18 132/84   02/26/18 130/80   02/16/18 110/60    Weight from Last 3 Encounters:   06/29/18 138 lb (62.6 kg)   02/26/18 139 lb (63 kg)   02/16/18 139 lb 6.4 oz (63.2 kg)              We Performed the Following     Okay for Smoking Cessation Study (PLUTO) to Contact Patient     Prof fee: Shared Decisionmaking for Lung Cancer Screening          Today's Medication Changes          These changes are accurate as of 6/29/18  2:20 PM.  If you have any questions, ask your nurse or doctor.               Start taking these medicines.        Dose/Directions    fluticasone-salmeterol 115-21 MCG/ACT Inhaler   Commonly known as:  ADVAIR-HFA   Used for:  Mild intermittent asthma without complication   Started by:  Terri Hill CNP        Dose:  2 puff   Inhale 2 puffs into the lungs 2 times daily   Quantity:  36 g   Refills:  3       loratadine 10 MG tablet   Commonly known as:  CLARITIN   Used for:  Other chronic rhinitis   Started by:  Terri Hill CNP        Dose:  10 mg   Take 1 tablet (10 mg) by mouth daily   Quantity:  90 tablet   Refills:  1         Stop taking these medicines if you haven't already. Please contact your care team if you have questions.     fluticasone 220 MCG/ACT Inhaler   Commonly known as:  FLOVENT " HFA   Stopped by:  Terri Hill CNP                Where to get your medicines      These medications were sent to [facility] - [address] [address]     Phone:  [phone]     fluticasone-salmeterol 115-21 MCG/ACT Inhaler    loratadine 10 MG tablet                Primary Care Provider Office Phone # Fax #    Vince Vasquez [phone]       [facility] [address]        Equal Access to Services     [facility]: Hadii aad ku hadasho Soomaali, waaxda luqadaha, qaybta kaalmada adedustinyahector, cayetano lau . So [facility] [phone].    ATENCIÓN: Si habla español, tiene a weathers disposición servicios gratuitos de asistencia lingüística. [facility] [phone].    We comply with applicable federal civil rights laws and [place] laws. We do not discriminate on the basis of race, color, national origin, age, disability, sex, sexual orientation, or gender identity.            Thank you!     Thank you for choosing [facility]  for your care. Our goal is always to provide you with excellent care. Hearing back from our patients is one way we can continue to improve our services. Please take a few minutes to complete the written survey that you may receive in the mail after your visit with us. Thank you!             Your Updated Medication List - Protect others around you: Learn how to safely use, store and throw away your medicines at www.disposemymeds.org.          This list is accurate as of 6/29/18  2:20 PM.  Always use your most recent med list.                   Brand Name Dispense Instructions for use Diagnosis    albuterol 108 (90 Base) MCG/ACT Inhaler    VENTOLIN HFA    25.5 g    Inhale 2 puffs into the lungs every 6 hours as needed for shortness of breath / dyspnea    Chronic non-seasonal allergic rhinitis, unspecified trigger       allopurinol  100 MG tablet    ZYLOPRIM    90 tablet    Take 1 tablet (100 mg) by mouth daily    Chronic gout of multiple sites, unspecified cause       buPROPion 150 MG 12 hr tablet    WELLBUTRIN SR    180 tablet    Take 1 tablet (150 mg) by mouth 2 times daily    Encounter for smoking cessation counseling       calcium 600 MG tablet     60 Tab    1 TABLET TWICE DAILY WITH FOOD        colchicine 0.6 MG tablet    COLCRYS    30 tablet    Take 2 tablets at the first sign of flare, take 1 additional tablet one hour later. Then take 1 tablet twice daily until your gout flare is gone        fluticasone-salmeterol 115-21 MCG/ACT Inhaler    ADVAIR-HFA    36 g    Inhale 2 puffs into the lungs 2 times daily    Mild intermittent asthma without complication       lisinopril 2.5 MG tablet    PRINIVIL/Zestril    90 tablet    Take 1 tablet (2.5 mg) by mouth daily    Benign essential hypertension       loratadine 10 MG tablet    CLARITIN    90 tablet    Take 1 tablet (10 mg) by mouth daily    Other chronic rhinitis       metoprolol tartrate 50 MG tablet    LOPRESSOR    90 tablet    Take 1 tablet (50 mg) by mouth daily    Benign essential hypertension       montelukast 10 MG tablet    SINGULAIR    90 tablet    Take 1 tablet (10 mg) by mouth At Bedtime    Chronic non-seasonal allergic rhinitis, unspecified trigger       multivitamin per tablet     100 Tab    ONE DAILY        nicotine 14 MG/24HR 24 hr patch    NICODERM CQ    30 patch    Place 1 patch onto the skin every 24 hours    Encounter for smoking cessation counseling       order for DME     1 Device    Equipment being ordered: Nebulizer    Chest wall pain, Cough       order for DME     2 Units    Equipment being ordered: RAH HOSE to knee 10-15 mmHg calf width 14 cm bilaterally    Varicose veins of both lower extremities       vitamin D3 1000 units Caps     30 Cap    1 CAPSULE DAILY

## 2018-06-29 NOTE — TELEPHONE ENCOUNTER
"Requested Prescriptions   Pending Prescriptions Disp Refills     ADVAIR -21 MCG/ACT Inhaler [Pharmacy Med Name: ADVAIR -21MCG HFA AER AD] 36 g      Sig: Inhale 2 puffs into the lungs 2 times daily    Inhaled Steroids Protocol Failed    6/29/2018  3:03 PM       Failed - Asthma control assessment score within normal limits in last 6 months    Please review ACT score.          Passed - Patient is age 12 or older       Passed - Recent (6 mo) or future (30 days) visit within the authorizing provider's specialty    Patient had office visit in the last 6 months or has a visit in the next 30 days with authorizing provider or within the authorizing provider's specialty.  See \"Patient Info\" tab in inbasket, or \"Choose Columns\" in Meds & Orders section of the refill encounter.          fluticasone-salmeterol (ADVAIR-HFA) 115-21 MCG/ACT Inhaler  Last Written Prescription Date:  06/29/2018  Last Fill Quantity: 36g,  # refills: 3   Last office visit: No previous visit found with prescribing provider:  06/29/2018 CLARISSE Hill   Future Office Visit:        ACT Total Scores 5/5/2017 5/5/2017 2/16/2018   ACT TOTAL SCORE - - -   ASTHMA ER VISITS - - -   ASTHMA HOSPITALIZATIONS - - -   ACT TOTAL SCORE (Goal Greater than or Equal to 20) 18 18 15   In the past 12 months, how many times did you visit the emergency room for your asthma without being admitted to the hospital? 0 0 0   In the past 12 months, how many times were you hospitalized overnight because of your asthma? 0 0 0     Sepideh ALEXANDER (SYDNEE) (M)      "

## 2018-11-21 ENCOUNTER — OFFICE VISIT (OUTPATIENT)
Dept: FAMILY MEDICINE | Facility: CLINIC | Age: 63
End: 2018-11-21
Payer: COMMERCIAL

## 2018-11-21 VITALS
DIASTOLIC BLOOD PRESSURE: 88 MMHG | BODY MASS INDEX: 24.75 KG/M2 | SYSTOLIC BLOOD PRESSURE: 132 MMHG | HEIGHT: 64 IN | OXYGEN SATURATION: 95 % | WEIGHT: 145 LBS | TEMPERATURE: 98.1 F | HEART RATE: 74 BPM

## 2018-11-21 DIAGNOSIS — Z23 NEED FOR PROPHYLACTIC VACCINATION AND INOCULATION AGAINST INFLUENZA: ICD-10-CM

## 2018-11-21 DIAGNOSIS — Z23 INFLUENZA VACCINATION ADMINISTERED AT CURRENT VISIT: ICD-10-CM

## 2018-11-21 DIAGNOSIS — H25.9 AGE-RELATED CATARACT OF BOTH EYES, UNSPECIFIED AGE-RELATED CATARACT TYPE: ICD-10-CM

## 2018-11-21 DIAGNOSIS — Z01.818 PREOP GENERAL PHYSICAL EXAM: Primary | ICD-10-CM

## 2018-11-21 DIAGNOSIS — J45.20 MILD INTERMITTENT ASTHMA WITHOUT COMPLICATION: Chronic | ICD-10-CM

## 2018-11-21 DIAGNOSIS — N39.0 URINARY TRACT INFECTION WITHOUT HEMATURIA, SITE UNSPECIFIED: ICD-10-CM

## 2018-11-21 DIAGNOSIS — R82.90 NONSPECIFIC FINDING ON EXAMINATION OF URINE: ICD-10-CM

## 2018-11-21 LAB
ALBUMIN UR-MCNC: 30 MG/DL
APPEARANCE UR: ABNORMAL
BACTERIA #/AREA URNS HPF: ABNORMAL /HPF
BILIRUB UR QL STRIP: NEGATIVE
COLOR UR AUTO: YELLOW
GLUCOSE UR STRIP-MCNC: NEGATIVE MG/DL
HGB UR QL STRIP: ABNORMAL
KETONES UR STRIP-MCNC: NEGATIVE MG/DL
LEUKOCYTE ESTERASE UR QL STRIP: ABNORMAL
NITRATE UR QL: NEGATIVE
NON-SQ EPI CELLS #/AREA URNS LPF: ABNORMAL /LPF
PH UR STRIP: 7 PH (ref 5–7)
RBC #/AREA URNS AUTO: ABNORMAL /HPF
SOURCE: ABNORMAL
SP GR UR STRIP: 1.02 (ref 1–1.03)
UROBILINOGEN UR STRIP-ACNC: 0.2 EU/DL (ref 0.2–1)
WBC #/AREA URNS AUTO: >100 /HPF

## 2018-11-21 PROCEDURE — 81001 URINALYSIS AUTO W/SCOPE: CPT | Performed by: NURSE PRACTITIONER

## 2018-11-21 PROCEDURE — 90682 RIV4 VACC RECOMBINANT DNA IM: CPT | Performed by: NURSE PRACTITIONER

## 2018-11-21 PROCEDURE — 87086 URINE CULTURE/COLONY COUNT: CPT | Performed by: NURSE PRACTITIONER

## 2018-11-21 PROCEDURE — 99214 OFFICE O/P EST MOD 30 MIN: CPT | Mod: 25 | Performed by: NURSE PRACTITIONER

## 2018-11-21 PROCEDURE — 90471 IMMUNIZATION ADMIN: CPT | Performed by: NURSE PRACTITIONER

## 2018-11-21 RX ORDER — CIPROFLOXACIN 250 MG/1
250 TABLET, FILM COATED ORAL 2 TIMES DAILY
Qty: 20 TABLET | Refills: 0 | Status: SHIPPED | OUTPATIENT
Start: 2018-11-21 | End: 2018-12-01

## 2018-11-21 NOTE — PROGRESS NOTES

## 2018-11-21 NOTE — PROGRESS NOTES
[address] [address]  [phone]  Dept: [phone]    PRE-OP EVALUATION:  Today's date: 2018    Fabi Rizzo (: 1955) presents for pre-operative evaluation assessment as requested by Dr. Love.  She requires evaluation and anesthesia risk assessment prior to undergoing surgery/procedure for treatment of cataract surgery .    Proposed Surgery/ Procedure:   Date of Surgery/ Procedure: 2018 and 2018  Time of Surgery/ Procedure: 9:00  am  Hospital/Surgical Facility:   Fax number for surgical facility: [phone]  Primary Physician: Vince Vasquez  Type of Anesthesia Anticipated: General    Patient has a Health Care Directive or Living Will:  NO    1. NO - Do you have a history of heart attack, stroke, stent, bypass or surgery on an artery in the head, neck, heart or legs?  2. NO - Do you ever have any pain or discomfort in your chest?  3. NO - Do you have a history of  Heart Failure?  4. YES, asthma - Are you troubled by shortness of breath when: walking on the level, up a slight hill or at night?  5. NO - Do you currently have a cold, bronchitis or other respiratory infection?  6. NO - Do you have a cough, shortness of breath or wheezing?  7. NO - Do you sometimes get pains in the calves of your legs when you walk?  8. YES, daughter  - Do you or anyone in your family have previous history of blood clots?  9. NO - Do you or does anyone in your family have a serious bleeding problem such as prolonged bleeding following surgeries or cuts?  10. NO - Have you ever had problems with anemia or been told to take iron pills?  11. NO - Have you had any abnormal blood loss such as black, tarry or bloody stools, or abnormal vaginal bleeding?  12. NO - Have you ever had a blood transfusion?  13. NO - Have you or any of your relatives ever had problems with anesthesia?  14. NO - Do you have sleep apnea, excessive snoring or  daytime drowsiness?  15. NO - Do you have any prosthetic heart valves?  16. NO - Do you have prosthetic joints?  17. NO - Is there any chance that you may be pregnant?    URINARY TRACT SYMPTOMS      Duration: 4 days     Description  frequency, urgency and odor    Intensity:  moderate    Accompanying signs and symptoms:  Fever/chills: no   Flank pain YES  Nausea and vomiting: no   Vaginal symptoms: odor and itching  Abdominal/Pelvic Pain: no     History  History of frequent UTI's: YES- when she was a kid   History of kidney stones: no   Sexually Active: no   Possibility of pregnancy: No    Precipitating or alleviating factors: None    Therapies tried and outcome: increase fluid intake   Outcome: does not help        HPI:     HPI related to upcoming procedure: one year of worsening       See problem list for active medical problems.  Problems all longstanding and stable, except as noted/documented.  See ROS for pertinent symptoms related to these conditions.                                                                                                                                                          .    MEDICAL HISTORY:     Patient Active Problem List    Diagnosis Date Noted     Tobacco use disorder 06/15/2016     Priority: High     Intermittent asthma 01/13/2011     Priority: High     Benign essential hypertension 12/21/2010     Priority: High     Varicose veins of both lower extremities 10/17/2017     Priority: Medium     Chronic rhinitis 06/15/2016     Priority: Medium     Chronic gout of multiple sites, unspecified cause 06/15/2016     Priority: Medium     Spondylolisthesis of lumbar region 09/03/2015     Priority: Medium     Lumbar foraminal stenosis 09/03/2015     Priority: Medium     Alcohol use 11/16/2012     Priority: Medium     Chronic low back pain 11/16/2012     Priority: Medium     Pt referred to pain clinic.   Has been asking for pain meds for pain at different sites over the last year. Needs  to see pain specialist.  Also, of note, drinks alcohol almost on a daily basis.   Poor compliance with appointments.       GERD (gastroesophageal reflux disease) 04/20/2011     Priority: Medium     Major depressive disorder, recurrent episode, mild (HCC) 04/20/2011     Priority: Medium     CARDIOVASCULAR SCREENING; LDL GOAL LESS THAN 130 10/31/2010     Priority: Medium     Advanced directives, counseling/discussion 09/21/2012     Priority: Low      History reviewed. No pertinent past medical history.  Past Surgical History:   Procedure Laterality Date     LAMINECTOMY LUMBAR POSTERIOR MICROSCOPIC ONE LEVEL N/A 11/17/2015    Procedure: LAMINECTOMY LUMBAR POSTERIOR MICROSCOPIC ONE LEVEL;  Surgeon: Stephen Vuong MD;  Location: [facility]     SURGICAL HISTORY OF -   09/30/2009    OPEN REDUCTION, INTERNAL FIXATINO OF RIGHT ANKLE (MEDIAL AND LATERL MALLEOLI)     Current Outpatient Prescriptions   Medication Sig Dispense Refill     albuterol (VENTOLIN HFA) 108 (90 BASE) MCG/ACT Inhaler Inhale 2 puffs into the lungs every 6 hours as needed for shortness of breath / dyspnea 25.5 g 3     allopurinol (ZYLOPRIM) 100 MG tablet Take 1 tablet (100 mg) by mouth daily 90 tablet 3     buPROPion (WELLBUTRIN SR) 150 MG 12 hr tablet Take 1 tablet (150 mg) by mouth 2 times daily 180 tablet 3     CALCIUM 600 MG PO TABS 1 TABLET TWICE DAILY WITH FOOD 60 Tab 3     colchicine (COLCRYS) 0.6 MG tablet Take 2 tablets at the first sign of flare, take 1 additional tablet one hour later. Then take 1 tablet twice daily until your gout flare is gone 30 tablet 3     Fluticasone-Salmeterol (AIRDUO RESPICLICK) 113-14 MCG/ACT AEPB inhaler Inhale 1 puff into the lungs every 12 hours 3 each 3     lisinopril (PRINIVIL/ZESTRIL) 2.5 MG tablet Take 1 tablet (2.5 mg) by mouth daily 90 tablet 1     loratadine (CLARITIN) 10 MG tablet Take 1 tablet (10 mg) by mouth daily 90 tablet 1     metoprolol tartrate (LOPRESSOR) 50 MG tablet Take 1 tablet (50 mg) by mouth  "daily 90 tablet 3     montelukast (SINGULAIR) 10 MG tablet Take 1 tablet (10 mg) by mouth At Bedtime 90 tablet 3     MULTIVITAMINS PO TABS ONE DAILY 100 Tab 3     nicotine (NICODERM CQ) 14 MG/24HR 24 hr patch Place 1 patch onto the skin every 24 hours 30 patch 0     order for DME Equipment being ordered: RAH HOSE to knee 10-15 mmHg calf width 14 cm bilaterally 2 Units 0     ORDER FOR DME Equipment being ordered: Nebulizer 1 Device 0     VITAMIN D3 1000 UNIT PO CAPS 1 CAPSULE DAILY 30 Cap      OTC products: None, except as noted above    No Known Allergies   Latex Allergy: NO    Social History   Substance Use Topics     Smoking status: Current Every Day Smoker     Packs/day: 0.25     Years: 30.00     Types: Cigarettes     Smokeless tobacco: Never Used     Alcohol use Yes     History   Drug Use No       REVIEW OF SYSTEMS:   CONSTITUTIONAL: NEGATIVE for fever, chills, change in weight  ENT/MOUTH: NEGATIVE for ear, mouth and throat problems  RESP: NEGATIVE for significant cough or SOB  CV: NEGATIVE for chest pain, palpitations or peripheral edema    EXAM:   /88  Pulse 74  Temp 98.1  F (36.7  C)  Ht 5' 4\" (1.626 m)  Wt 145 lb (65.8 kg)  SpO2 95%  BMI 24.89 kg/m2  GENERAL APPEARANCE: healthy, alert and no distress  HENT: ear canals and TM's normal and nose and mouth without ulcers or lesions  RESP: lungs clear to auscultation - no rales, rhonchi or wheezes  CV: regular rate and rhythm, normal S1 S2, no S3 or S4 and no murmur, click or rub   ABDOMEN: soft, nontender, no HSM or masses and bowel sounds normal  NEURO: Normal strength and tone, sensory exam grossly normal, mentation intact and speech normal    DIAGNOSTICS:   No labs or EKG required for low risk surgery (cataract, skin procedure, breast biopsy, etc)    Recent Labs   Lab Test  06/22/18   0845  06/15/16   1030  11/04/15   1620   HGB   --   14.6  11.6*   PLT   --   236  200   NA  138   --   139   POTASSIUM  4.0   --   3.8   CR  1.02   --   0.89    "     IMPRESSION:   Reason for surgery/procedure: cataracts bilateral  Diagnosis/reason for consult: surgical risk    The proposed surgical procedure is considered LOW risk.    REVISED CARDIAC RISK INDEX  The patient has the following serious cardiovascular risks for perioperative complications such as (MI, PE, VFib and 3  AV Block):  No serious cardiac risks  INTERPRETATION: 0 risks: Class I (very low risk - 0.4% complication rate)    The patient has the following additional risks for perioperative complications:  No identified additional risks    Results for orders placed or performed in visit on 11/21/18   UA reflex to Microscopic and Culture   Result Value Ref Range    Color Urine Yellow     Appearance Urine Slightly Cloudy     Glucose Urine Negative NEG^Negative mg/dL    Bilirubin Urine Negative NEG^Negative    Ketones Urine Negative NEG^Negative mg/dL    Specific Gravity Urine 1.020 1.003 - 1.035    Blood Urine Small (A) NEG^Negative    pH Urine 7.0 5.0 - 7.0 pH    Protein Albumin Urine 30 (A) NEG^Negative mg/dL    Urobilinogen Urine 0.2 0.2 - 1.0 EU/dL    Nitrite Urine Negative NEG^Negative    Leukocyte Esterase Urine Large (A) NEG^Negative    Source Midstream Urine    Urine Microscopic   Result Value Ref Range    WBC Urine >100 (A) OTO5^0 - 5 /HPF    RBC Urine 5-10 (A) OTO2^O - 2 /HPF    Squamous Epithelial /LPF Urine Few FEW^Few /LPF    Bacteria Urine Moderate (A) NEG^Negative /HPF         ICD-10-CM    1. Preop general physical exam Z01.818 UA reflex to Microscopic and Culture     Urine Microscopic   2. Age-related cataract of both eyes, unspecified age-related cataract type H25.9    3. Nonspecific finding on examination of urine R82.90 Urine Culture Aerobic Bacterial   4. Influenza vaccination administered at current visit Z23        RECOMMENDATIONS:      1. Preop general physical exam  - UA reflex to Microscopic and Culture  - Urine Microscopic    2. Age-related cataract of both eyes, unspecified  age-related cataract type  Chronic, stable    3. Nonspecific finding on examination of urine  Acute  - Urine Culture Aerobic Bacterial    4. Urinary tract infection without hematuria, site unspecified  Acute  - ciprofloxacin (CIPRO) 250 MG tablet; Take 1 tablet (250 mg) by mouth 2 times daily for 10 days  Dispense: 20 tablet; Refill: 0  Don't take Calcium at the same time as Ciprofloxacin  Culture pending    5. Influenza vaccination administered at current visit  Immunization    6. Mild intermittent asthma without complication  Chronic, stable  You are WAY overdue for pulmonary function test. Please schedule in [place] before the end of December.    Before Your Surgery      Call your surgeon if there is any change in your health. This includes signs of a cold or flu (such as a sore throat, runny nose, cough, rash or fever).    Do not smoke, drink alcohol or take over the counter medicine (unless your surgeon or primary care doctor tells you to) for the 24 hours before and after surgery.    If you take prescribed drugs: Follow your doctor s orders about which medicines to take and which to stop until after surgery.    Eating and drinking prior to surgery: follow the instructions from your surgeon    Take a shower or bath the night before surgery. Use the soap your surgeon gave you to gently clean your skin. If you do not have soap from your surgeon, use your regular soap. Do not shave or scrub the surgery site.  Wear clean pajamas and have clean sheets on your bed.           --Patient is to take all scheduled medications on the day of surgery EXCEPT for modifications listed below.    Anticoagulant or Antiplatelet Medication Use  ASPIRIN: Discontinue ASA 7-10 days prior to procedure to reduce bleeding risk.  It should be resumed post-operatively.  NSAIDS: Ibuprofen (Motrin):         Stop one day prior to surgery  Naproxen (Naprosyn):   Stop 2-3 days prior to surgery        APPROVAL GIVEN to proceed with proposed  procedure, without further diagnostic evaluation       Signed Electronically by: Terri Hill CNP    Copy of this evaluation report is provided to requesting physician.    [place] Preop Guidelines    Revised Cardiac Risk Index

## 2018-11-21 NOTE — PATIENT INSTRUCTIONS
1. Preop general physical exam  - UA reflex to Microscopic and Culture  - Urine Microscopic    2. Age-related cataract of both eyes, unspecified age-related cataract type  Chronic, stable    3. Nonspecific finding on examination of urine  Acute  - Urine Culture Aerobic Bacterial    4. Urinary tract infection without hematuria, site unspecified  Acute  - ciprofloxacin (CIPRO) 250 MG tablet; Take 1 tablet (250 mg) by mouth 2 times daily for 10 days  Dispense: 20 tablet; Refill: 0  Don't take Calcium at the same time as Ciprofloxacin  Culture pending    5. Influenza vaccination administered at current visit  Immunization    6. Mild intermittent asthma without complication  Chronic, stable  You are WAY overdue for pulmonary function test. Please schedule in [place] before the end of December.    Before Your Surgery      Call your surgeon if there is any change in your health. This includes signs of a cold or flu (such as a sore throat, runny nose, cough, rash or fever).    Do not smoke, drink alcohol or take over the counter medicine (unless your surgeon or primary care doctor tells you to) for the 24 hours before and after surgery.    If you take prescribed drugs: Follow your doctor s orders about which medicines to take and which to stop until after surgery.    Eating and drinking prior to surgery: follow the instructions from your surgeon    Take a shower or bath the night before surgery. Use the soap your surgeon gave you to gently clean your skin. If you do not have soap from your surgeon, use your regular soap. Do not shave or scrub the surgery site.  Wear clean pajamas and have clean sheets on your bed.

## 2018-11-21 NOTE — MR AVS SNAPSHOT
After Visit Summary   11/21/2018    Fabi Rizzo    MRN: [id number]           Patient Information     Date Of Birth          1955        Visit Information        Provider Department      11/21/2018 1:00 PM Terri Hill, CNP [facility]        Today's Diagnoses     Preop general physical exam    -  1    Age-related cataract of both eyes, unspecified age-related cataract type        Nonspecific finding on examination of urine        Urinary tract infection without hematuria, site unspecified        Influenza vaccination administered at current visit        Mild intermittent asthma without complication          Care Instructions    1. Preop general physical exam  - UA reflex to Microscopic and Culture  - Urine Microscopic    2. Age-related cataract of both eyes, unspecified age-related cataract type  Chronic, stable    3. Nonspecific finding on examination of urine  Acute  - Urine Culture Aerobic Bacterial    4. Urinary tract infection without hematuria, site unspecified  Acute  - ciprofloxacin (CIPRO) 250 MG tablet; Take 1 tablet (250 mg) by mouth 2 times daily for 10 days  Dispense: 20 tablet; Refill: 0  Don't take Calcium at the same time as Ciprofloxacin  Culture pending    5. Influenza vaccination administered at current visit  Immunization    6. Mild intermittent asthma without complication  Chronic, stable  You are WAY overdue for pulmonary function test. Please schedule in [place] before the end of December.    Before Your Surgery      Call your surgeon if there is any change in your health. This includes signs of a cold or flu (such as a sore throat, runny nose, cough, rash or fever).    Do not smoke, drink alcohol or take over the counter medicine (unless your surgeon or primary care doctor tells you to) for the 24 hours before and after surgery.    If you take prescribed drugs: Follow your doctor s orders about which medicines to take and which to stop until  "after surgery.    Eating and drinking prior to surgery: follow the instructions from your surgeon    Take a shower or bath the night before surgery. Use the soap your surgeon gave you to gently clean your skin. If you do not have soap from your surgeon, use your regular soap. Do not shave or scrub the surgery site.  Wear clean pajamas and have clean sheets on your bed.           Follow-ups after your visit        Follow-up notes from your care team     Return in about 1 year (around 11/21/2019) for Routine Visit.      Who to contact     If you have questions or need follow up information about today's clinic visit or your schedule please contact [facility] directly at [phone].  Normal or non-critical lab and imaging results will be communicated to you by Zinkiahart, letter or phone within 4 business days after the clinic has received the results. If you do not hear from us within 7 days, please contact the clinic through RF Arrayst or phone. If you have a critical or abnormal lab result, we will notify you by phone as soon as possible.  Submit refill requests through [employer] or call your pharmacy and they will forward the refill request to us. Please allow 3 business days for your refill to be completed.          Additional Information About Your Visit        Zinkiahar[employer] Mack Information     [employer] gives you secure access to your electronic health record. If you see a primary care provider, you can also send messages to your care team and make appointments. If you have questions, please call your primary care clinic.  If you do not have a primary care provider, please call [phone] and they will assist you.        Care EveryWhere ID     This is your Care EveryWhere ID. This could be used by other organizations to access your [place] medical records  [id number]        Your Vitals Were     Pulse Temperature Height Pulse Oximetry BMI (Body Mass Index)       74 98.1  F (36.7  C) 5' 4\" (1.626 m) 95% 24.89 " kg/m2        Blood Pressure from Last 3 Encounters:   11/21/18 132/88   06/29/18 132/84   02/26/18 130/80    Weight from Last 3 Encounters:   11/21/18 145 lb (65.8 kg)   06/29/18 138 lb (62.6 kg)   02/26/18 139 lb (63 kg)              We Performed the Following     UA reflex to Microscopic and Culture     Urine Culture Aerobic Bacterial     Urine Microscopic          Today's Medication Changes          These changes are accurate as of 11/21/18  2:02 PM.  If you have any questions, ask your nurse or doctor.               Start taking these medicines.        Dose/Directions    ciprofloxacin 250 MG tablet   Commonly known as:  CIPRO   Used for:  Urinary tract infection without hematuria, site unspecified   Started by:  Terri Hill CNP        Dose:  250 mg   Take 1 tablet (250 mg) by mouth 2 times daily for 10 days   Quantity:  20 tablet   Refills:  0            Where to get your medicines      These medications were sent to [facility] [id number]     Phone:  [phone]     ciprofloxacin 250 MG tablet                Primary Care Provider Office Phone # Fax #    Vince Vasquez [phone]       [address] [id number]        Equal Access to Services     VIET HELMS AH: Elias edwardso Sochiragali, waaxda luqadaha, qaybta kaalmada adeegyada, cayetano chapa. So [facility] [phone].    ATENCIÓN: Si habla español, tiene a weathers disposición servicios gratuitos de asistencia lingüística. Llame al [phone].    We comply with applicable federal civil rights laws and [place] laws. We do not discriminate on the basis of race, color, national origin, age, disability, sex, sexual orientation, or gender identity.            Thank you!     Thank you for choosing [facility]  for your care. Our goal is always to provide you with excellent  care. Hearing back from our patients is one way we can continue to improve our services. Please take a few minutes to complete the written survey that you may receive in the mail after your visit with us. Thank you!             Your Updated Medication List - Protect others around you: Learn how to safely use, store and throw away your medicines at www.disposemymeds.org.          This list is accurate as of 11/21/18  2:02 PM.  Always use your most recent med list.                   Brand Name Dispense Instructions for use Diagnosis    albuterol 108 (90 Base) MCG/ACT inhaler    VENTOLIN HFA    25.5 g    Inhale 2 puffs into the lungs every 6 hours as needed for shortness of breath / dyspnea    Chronic non-seasonal allergic rhinitis, unspecified trigger       allopurinol 100 MG tablet    ZYLOPRIM    90 tablet    Take 1 tablet (100 mg) by mouth daily    Chronic gout of multiple sites, unspecified cause       buPROPion 150 MG 12 hr tablet    WELLBUTRIN SR    180 tablet    Take 1 tablet (150 mg) by mouth 2 times daily    Encounter for smoking cessation counseling       calcium 600 MG tablet     60 Tab    1 TABLET TWICE DAILY WITH FOOD        ciprofloxacin 250 MG tablet    CIPRO    20 tablet    Take 1 tablet (250 mg) by mouth 2 times daily for 10 days    Urinary tract infection without hematuria, site unspecified       colchicine 0.6 MG tablet    COLCRYS    30 tablet    Take 2 tablets at the first sign of flare, take 1 additional tablet one hour later. Then take 1 tablet twice daily until your gout flare is gone        fluticasone-salmeterol 113-14 MCG/ACT inhaler    AIRDUO RESPICLICK    3 each    Inhale 1 puff into the lungs every 12 hours    Mild intermittent asthma without complication, Personal history of tobacco use       lisinopril 2.5 MG tablet    PRINIVIL/Zestril    90 tablet    Take 1 tablet (2.5 mg) by mouth daily    Benign essential hypertension       loratadine 10 MG tablet    CLARITIN    90 tablet    Take 1  tablet (10 mg) by mouth daily    Other chronic rhinitis       metoprolol tartrate 50 MG tablet    LOPRESSOR    90 tablet    Take 1 tablet (50 mg) by mouth daily    Benign essential hypertension       montelukast 10 MG tablet    SINGULAIR    90 tablet    Take 1 tablet (10 mg) by mouth At Bedtime    Chronic non-seasonal allergic rhinitis, unspecified trigger       multivitamin per tablet     100 Tab    ONE DAILY        nicotine 14 MG/24HR 24 hr patch    NICODERM CQ    30 patch    Place 1 patch onto the skin every 24 hours    Encounter for smoking cessation counseling       order for DME     1 Device    Equipment being ordered: Nebulizer    Chest wall pain, Cough       order for DME     2 Units    Equipment being ordered: RAH HOSE to knee 10-15 mmHg calf width 14 cm bilaterally    Varicose veins of both lower extremities       vitamin D3 1000 units Caps     30 Cap    1 CAPSULE DAILY

## 2018-11-22 LAB
BACTERIA SPEC CULT: NORMAL
Lab: NORMAL
SPECIMEN SOURCE: NORMAL

## 2018-11-23 NOTE — PROGRESS NOTES
Dear Fabi,  Your urine culture shows less than 10,000 colonies of mixed urogenital peewee. This indicates you do not have a true urinary tract infection. You can stop the antibiotic.   Please contact our clinic via phone or My Chart if you have any questions or concerns.  Thanks,  Terri Hill, AMARJIT

## 2019-01-02 DIAGNOSIS — J30.89 CHRONIC NON-SEASONAL ALLERGIC RHINITIS: ICD-10-CM

## 2019-01-03 RX ORDER — ALBUTEROL SULFATE 90 UG/1
AEROSOL, METERED RESPIRATORY (INHALATION)
Qty: 25.5 G | Refills: 0 | Status: SHIPPED | OUTPATIENT
Start: 2019-01-03 | End: 2019-01-29

## 2019-01-03 NOTE — TELEPHONE ENCOUNTER
"Requested Prescriptions   Pending Prescriptions Disp Refills     albuterol (VENTOLIN HFA) 108 (90 Base) MCG/ACT inhaler [Pharmacy Med Name: VENTOLIN HFA 90 MCG HFA AER AD] 25.5 g 3     Sig: Inhale 2 puffs into the lungs every 6 hours as needed for shortness of breath / dyspnea    Asthma Maintenance Inhalers - Anticholinergics Failed - 1/2/2019  5:01 PM       Failed - Asthma control assessment score within normal limits in last 6 months    Please review ACT score.     ACT Total Scores 5/5/2017 5/5/2017 2/16/2018   ACT TOTAL SCORE - - -   ASTHMA ER VISITS - - -   ASTHMA HOSPITALIZATIONS - - -   ACT TOTAL SCORE (Goal Greater than or Equal to 20) 18 18 15   In the past 12 months, how many times did you visit the emergency room for your asthma without being admitted to the hospital? 0 0 0   In the past 12 months, how many times were you hospitalized overnight because of your asthma? 0 0 0              Passed - Patient is age 12 years or older       Passed - Recent (6 mo) or future (30 days) visit within the authorizing provider's specialty    Patient had office visit in the last 6 months or has a visit in the next 30 days with authorizing provider or within the authorizing provider's specialty.  See \"Patient Info\" tab in inbasket, or \"Choose Columns\" in Meds & Orders section of the refill encounter.      Last Written Prescription Date:  2/16/18  Last Fill Quantity: 25.5,  # refills: 3   Last office visit: 11/21/2018 with prescribing provider:     Future Office Visit:                "

## 2019-01-10 ENCOUNTER — HOSPITAL ENCOUNTER (OUTPATIENT)
Dept: RESPIRATORY THERAPY | Facility: CLINIC | Age: 64
Discharge: HOME | End: 2019-01-10
Attending: INTERNAL MEDICINE | Admitting: INTERNAL MEDICINE
Payer: COMMERCIAL

## 2019-01-10 DIAGNOSIS — Z87.891 PERSONAL HISTORY OF TOBACCO USE: ICD-10-CM

## 2019-01-10 DIAGNOSIS — J45.20 MILD INTERMITTENT ASTHMA WITHOUT COMPLICATION: Chronic | ICD-10-CM

## 2019-01-10 PROCEDURE — 94726 PLETHYSMOGRAPHY LUNG VOLUMES: CPT

## 2019-01-10 PROCEDURE — 94729 DIFFUSING CAPACITY: CPT

## 2019-01-10 PROCEDURE — 94060 EVALUATION OF WHEEZING: CPT | Mod: 26 | Performed by: INTERNAL MEDICINE

## 2019-01-10 PROCEDURE — 94729 DIFFUSING CAPACITY: CPT | Mod: 26 | Performed by: INTERNAL MEDICINE

## 2019-01-10 PROCEDURE — 94726 PLETHYSMOGRAPHY LUNG VOLUMES: CPT | Mod: 26 | Performed by: INTERNAL MEDICINE

## 2019-01-10 PROCEDURE — 25000125 ZZHC RX 250: Performed by: NURSE PRACTITIONER

## 2019-01-10 PROCEDURE — 94060 EVALUATION OF WHEEZING: CPT

## 2019-01-10 RX ORDER — ALBUTEROL SULFATE 0.83 MG/ML
2.5 SOLUTION RESPIRATORY (INHALATION) ONCE
Status: COMPLETED | OUTPATIENT
Start: 2019-01-10 | End: 2019-01-10

## 2019-01-10 RX ADMIN — ALBUTEROL SULFATE 2.5 MG: 2.5 SOLUTION RESPIRATORY (INHALATION) at 15:10

## 2019-01-17 LAB
DLCOUNC-%PRED-PRE: 96 %
DLCOUNC-PRE: 20.59 ML/MIN/MMHG
DLCOUNC-PRED: 21.3 ML/MIN/MMHG
ERV-%PRED-PRE: 14 %
ERV-PRE: 0.11 L
ERV-PRED: 0.8 L
EXPTIME-PRE: 7.21 SEC
FEF2575-%PRED-POST: 56 %
FEF2575-%PRED-PRE: 47 %
FEF2575-POST: 1.22 L/SEC
FEF2575-PRE: 1.02 L/SEC
FEF2575-PRED: 2.15 L/SEC
FEFMAX-%PRED-PRE: 71 %
FEFMAX-PRE: 4.37 L/SEC
FEFMAX-PRED: 6.13 L/SEC
FEV1-%PRED-PRE: 81 %
FEV1-PRE: 1.97 L
FEV1FEV6-PRE: 60 %
FEV1FEV6-PRED: 80 %
FEV1FVC-PRE: 59 %
FEV1FVC-PRED: 77 %
FEV1SVC-PRE: 55 %
FEV1SVC-PRED: 75 %
FIFMAX-PRE: 5.23 L/SEC
FRCPLETH-%PRED-PRE: 222 %
FRCPLETH-PRE: 6 L
FRCPLETH-PRED: 2.7 L
FVC-%PRED-PRE: 108 %
FVC-PRE: 3.36 L
FVC-PRED: 3.08 L
IC-%PRED-PRE: 141 %
IC-PRE: 3.4 L
IC-PRED: 2.41 L
RVPLETH-%PRED-PRE: 299 %
RVPLETH-PRE: 5.85 L
RVPLETH-PRED: 1.95 L
TLCPLETH-%PRED-PRE: 190 %
TLCPLETH-PRE: 9.41 L
TLCPLETH-PRED: 4.94 L
VA-%PRED-PRE: 114 %
VA-PRE: 5.77 L
VC-%PRED-PRE: 110 %
VC-PRE: 3.56 L
VC-PRED: 3.21 L

## 2019-01-18 NOTE — RESULT ENCOUNTER NOTE
Dear Fabi,  Mild airflow obstruction. Continue with your current asthma medications. Smoking cessation is highly recommended.   Please contact our clinic via phone or My Chart if you have any questions or concerns.  Thanks,  AMARJIT Maria

## 2019-01-29 DIAGNOSIS — I10 BENIGN ESSENTIAL HYPERTENSION: ICD-10-CM

## 2019-01-29 DIAGNOSIS — M1A.09X0 CHRONIC GOUT OF MULTIPLE SITES, UNSPECIFIED CAUSE: ICD-10-CM

## 2019-01-29 DIAGNOSIS — J30.89 CHRONIC NON-SEASONAL ALLERGIC RHINITIS: ICD-10-CM

## 2019-01-30 RX ORDER — LISINOPRIL 2.5 MG/1
TABLET ORAL
Qty: 90 TABLET | Refills: 1 | Status: SHIPPED | OUTPATIENT
Start: 2019-01-30 | End: 2019-08-01

## 2019-01-30 RX ORDER — ALBUTEROL SULFATE 90 UG/1
AEROSOL, METERED RESPIRATORY (INHALATION)
Qty: 25.5 G | Refills: 0 | Status: SHIPPED | OUTPATIENT
Start: 2019-01-30 | End: 2019-03-07

## 2019-01-30 RX ORDER — ALLOPURINOL 100 MG/1
100 TABLET ORAL DAILY
Qty: 90 TABLET | Refills: 1 | Status: SHIPPED | OUTPATIENT
Start: 2019-01-30 | End: 2019-08-01

## 2019-01-30 NOTE — TELEPHONE ENCOUNTER
"Requested Prescriptions   Pending Prescriptions Disp Refills     allopurinol (ZYLOPRIM) 100 MG tablet [Pharmacy Med Name: ALLOPURINOL 100 MG TABLET] 90 tablet 3    Last Written Prescription Date:  2/16/2018  Last Fill Quantity: 90,  # refills: 3   Last office visit: 11/21/2018 with prescribing provider:  11/21/2018   Future Office Visit:     Sig: Take 1 tablet (100 mg) by mouth daily    Gout Agents Protocol Failed - 1/29/2019  5:30 PM       Failed - CBC on file in past 12 months    Recent Labs   Lab Test 06/15/16  1030   WBC 4.7   RBC 4.12   HGB 14.6   HCT 43.4                   Passed - ALT on file in past 12 months    Recent Labs   Lab Test 06/22/18  0845   ALT 60*            Passed - Has Uric Acid on file in past 12 months and value is less than 6    Recent Labs   Lab Test 06/22/18  0845   URIC 5.9     If level is 6mg/dL or greater, ok to refill one time and refer to provider.          Passed - Recent (12 mo) or future (30 days) visit within the authorizing provider's specialty    Patient had office visit in the last 12 months or has a visit in the next 30 days with authorizing provider or within the authorizing provider's specialty.  See \"Patient Info\" tab in inbasket, or \"Choose Columns\" in Meds & Orders section of the refill encounter.             Passed - Medication is active on med list       Passed - Patient is age 18 or older       Passed - No active pregnancy on record       Passed - Normal serum creatinine on file in the past 12 months    Recent Labs   Lab Test 06/22/18  0845   CR 1.02            Passed - No positive pregnancy test in past year        lisinopril (PRINIVIL/ZESTRIL) 2.5 MG tablet [Pharmacy Med Name: LISINOPRIL 2.5 MG TABLET] 90 tablet 1    Last Written Prescription Date:  8/3/2018  Last Fill Quantity: 90,  # refills: 1   Last office visit: 11/21/2018 with prescribing provider:  11/21/2018   Future Office Visit:     Sig: Take 1 tablet (2.5 mg) by mouth daily    ACE Inhibitors " "(Including Combos) Protocol Passed - 1/29/2019  5:30 PM       Passed - Blood pressure under 140/90 in past 12 months    BP Readings from Last 3 Encounters:   11/21/18 132/88   06/29/18 132/84   02/26/18 130/80                Passed - Recent (12 mo) or future (30 days) visit within the authorizing provider's specialty    Patient had office visit in the last 12 months or has a visit in the next 30 days with authorizing provider or within the authorizing provider's specialty.  See \"Patient Info\" tab in inbasket, or \"Choose Columns\" in Meds & Orders section of the refill encounter.             Passed - Medication is active on med list       Passed - Patient is age 18 or older       Passed - No active pregnancy on record       Passed - Normal serum creatinine on file in past 12 months    Recent Labs   Lab Test 06/22/18  0845   CR 1.02            Passed - Normal serum potassium on file in past 12 months    Recent Labs   Lab Test 06/22/18  0845   POTASSIUM 4.0            Passed - No positive pregnancy test within past 12 months        albuterol (VENTOLIN HFA) 108 (90 Base) MCG/ACT inhaler [Pharmacy Med Name: VENTOLIN HFA 90 MCG HFA AER AD] 25.5 g 0    Last Written Prescription Date:  1/3/2019  Last Fill Quantity: 25.5g,  # refills: 0   Last office visit: 11/21/2018 with prescribing provider:  11/21/2018   Future Office Visit:     Sig: Inhale 2 puffs into the lungs every 6 hours as needed for shortness of breath / dyspnea    Asthma Maintenance Inhalers - Anticholinergics Failed - 1/29/2019  5:30 PM       Failed - Asthma control assessment score within normal limits in last 6 months    Please review ACT score.          Passed - Patient is age 12 years or older       Passed - Medication is active on med list       Passed - Recent (6 mo) or future (30 days) visit within the authorizing provider's specialty    Patient had office visit in the last 6 months or has a visit in the next 30 days with authorizing provider or within " "the authorizing provider's specialty.  See \"Patient Info\" tab in inbasket, or \"Choose Columns\" in Meds & Orders section of the refill encounter.              "

## 2019-03-07 DIAGNOSIS — Z71.6 ENCOUNTER FOR SMOKING CESSATION COUNSELING: ICD-10-CM

## 2019-03-07 DIAGNOSIS — J30.89 CHRONIC NON-SEASONAL ALLERGIC RHINITIS: ICD-10-CM

## 2019-03-07 RX ORDER — BUPROPION HYDROCHLORIDE 150 MG/1
150 TABLET, EXTENDED RELEASE ORAL 2 TIMES DAILY
Qty: 180 TABLET | Refills: 0 | Status: SHIPPED | OUTPATIENT
Start: 2019-03-07 | End: 2019-08-28

## 2019-03-07 RX ORDER — ALBUTEROL SULFATE 90 UG/1
AEROSOL, METERED RESPIRATORY (INHALATION)
Qty: 18 G | Refills: 0 | Status: SHIPPED | OUTPATIENT
Start: 2019-03-07 | End: 2019-05-03

## 2019-03-07 NOTE — TELEPHONE ENCOUNTER
"Requested Prescriptions   Pending Prescriptions Disp Refills     albuterol (VENTOLIN HFA) 108 (90 Base) MCG/ACT inhaler [Pharmacy Med Name: VENTOLIN HFA 90 MCG HFA AER AD] 18 g      Sig: Inhale 2 puffs into the lungs every 6 hours as needed for shortness of breath / dyspnea    Asthma Maintenance Inhalers - Anticholinergics Failed - 3/7/2019  8:31 AM       Failed - Asthma control assessment score within normal limits in last 6 months    Please review ACT score.          Passed - Patient is age 12 years or older       Passed - Medication is active on med list       Passed - Recent (6 mo) or future (30 days) visit within the authorizing provider's specialty    Patient had office visit in the last 6 months or has a visit in the next 30 days with authorizing provider or within the authorizing provider's specialty.  See \"Patient Info\" tab in inbasket, or \"Choose Columns\" in Meds & Orders section of the refill encounter.            buPROPion (WELLBUTRIN SR) 150 MG 12 hr tablet [Pharmacy Med Name: BUPROPION HCL  MG TAB SR 12H] 180 tablet 3     Sig: Take 1 tablet (150 mg) by mouth 2 times daily    SSRIs Protocol Passed - 3/7/2019  8:31 AM       Passed - Recent (12 mo) or future (30 days) visit within the authorizing provider's specialty    Patient had office visit in the last 12 months or has a visit in the next 30 days with authorizing provider or within the authorizing provider's specialty.  See \"Patient Info\" tab in inbasket, or \"Choose Columns\" in Meds & Orders section of the refill encounter.             Passed - Medication is Bupropion    If the medication is Bupropion (Wellbutrin), and the patient is taking for smoking cessation; OK to refill.         Passed - Medication is active on med list       Passed - Patient is age 18 or older       Passed - No active pregnancy on record       Passed - No positive pregnancy test in last 12 months        Last Written Prescription Date:  1/30/19  Last Fill Quantity: 25.5g,  " # refills: 0   Last office visit: 11/21/2018 with prescribing provider:     Future Office Visit:

## 2019-03-18 DIAGNOSIS — I10 BENIGN ESSENTIAL HYPERTENSION: ICD-10-CM

## 2019-03-18 RX ORDER — METOPROLOL TARTRATE 50 MG
TABLET ORAL
Qty: 90 TABLET | Refills: 2 | Status: SHIPPED | OUTPATIENT
Start: 2019-03-18 | End: 2019-10-25

## 2019-03-18 NOTE — TELEPHONE ENCOUNTER
"Requested Prescriptions   Pending Prescriptions Disp Refills     metoprolol tartrate (LOPRESSOR) 50 MG tablet [Pharmacy Med Name: METOPROLOL TARTRATE 50 MG TABLET] 90 tablet 3     Sig: Take 1 tablet (50 mg) by mouth daily    Beta-Blockers Protocol Passed - 3/18/2019  9:51 AM       Passed - Blood pressure under 140/90 in past 12 months    BP Readings from Last 3 Encounters:   11/21/18 132/88   06/29/18 132/84   02/26/18 130/80                Passed - Patient is age 6 or older       Passed - Recent (12 mo) or future (30 days) visit within the authorizing provider's specialty    Patient had office visit in the last 12 months or has a visit in the next 30 days with authorizing provider or within the authorizing provider's specialty.  See \"Patient Info\" tab in inbasket, or \"Choose Columns\" in Meds & Orders section of the refill encounter.             Passed - Medication is active on med list        Last Written Prescription Date:  2/16/18  Last Fill Quantity: 90,  # refills: 3   Last office visit: 11/21/2018 with prescribing provider:     Future Office Visit:      "

## 2019-03-26 DIAGNOSIS — J30.89 CHRONIC NON-SEASONAL ALLERGIC RHINITIS: ICD-10-CM

## 2019-03-27 RX ORDER — MONTELUKAST SODIUM 10 MG/1
TABLET ORAL
Qty: 90 TABLET | Refills: 0 | Status: SHIPPED | OUTPATIENT
Start: 2019-03-27 | End: 2019-06-24

## 2019-03-27 NOTE — TELEPHONE ENCOUNTER
"Requested Prescriptions   Pending Prescriptions Disp Refills     montelukast (SINGULAIR) 10 MG tablet [Pharmacy Med Name: MONTELUKAST SODIUM 10 MG TABLET] 90 tablet 3     Sig: Take 1 tablet (10 mg) by mouth At Bedtime    Leukotriene Inhibitors Protocol Failed - 3/26/2019  3:00 PM       Failed - Asthma control assessment score within normal limits in last 6 months    Please review ACT score.     ACT Total Scores 5/5/2017 5/5/2017 2/16/2018   ACT TOTAL SCORE - - -   ASTHMA ER VISITS - - -   ASTHMA HOSPITALIZATIONS - - -   ACT TOTAL SCORE (Goal Greater than or Equal to 20) 18 18 15   In the past 12 months, how many times did you visit the emergency room for your asthma without being admitted to the hospital? 0 0 0   In the past 12 months, how many times were you hospitalized overnight because of your asthma? 0 0 0            Passed - Patient is age 12 or older    If patient is under 16, ok to refill using age based dosing.          Passed - Medication is active on med list       Passed - Recent (6 mo) or future (30 days) visit within the authorizing provider's specialty    Patient had office visit in the last 6 months or has a visit in the next 30 days with authorizing provider or within the authorizing provider's specialty.  See \"Patient Info\" tab in inbasket, or \"Choose Columns\" in Meds & Orders section of the refill encounter.      Last Written Prescription Date:  2/16/18  Last Fill Quantity: 90,  # refills: 3   Last office visit: 11/21/2018 with prescribing provider:     Future Office Visit:                "

## 2019-05-03 DIAGNOSIS — J30.89 CHRONIC NON-SEASONAL ALLERGIC RHINITIS: ICD-10-CM

## 2019-05-03 RX ORDER — ALBUTEROL SULFATE 90 UG/1
AEROSOL, METERED RESPIRATORY (INHALATION)
Qty: 18 G | Refills: 1 | Status: SHIPPED | OUTPATIENT
Start: 2019-05-03 | End: 2019-11-15

## 2019-05-03 NOTE — TELEPHONE ENCOUNTER
"Requested Prescriptions   Pending Prescriptions Disp Refills     albuterol (PROAIR HFA/PROVENTIL HFA/VENTOLIN HFA) 108 (90 Base) MCG/ACT inhaler [Pharmacy Med Name: ALBUTEROL SULFATE HFA 90 MCG HFA AER AD] 18 g 0     Sig: Inhale 2 puffs into the lungs every 6 hours as needed for shortness of breath / dyspnea       Asthma Maintenance Inhalers - Anticholinergics Failed - 5/3/2019  8:49 AM        Failed - Asthma control assessment score within normal limits in last 6 months     Please review ACT score.           Passed - Patient is age 12 years or older        Passed - Medication is active on med list        Passed - Recent (6 mo) or future (30 days) visit within the authorizing provider's specialty     Patient had office visit in the last 6 months or has a visit in the next 30 days with authorizing provider or within the authorizing provider's specialty.  See \"Patient Info\" tab in inbasket, or \"Choose Columns\" in Meds & Orders section of the refill encounter.            albuterol (VENTOLIN HFA) 108 (90 Base) MCG/ACT inhaler  Last Written Prescription Date:  03/07/2019  Last Fill Quantity: 18g,  # refills: 0   Last office visit: 11/21/2018 with prescribing provider:  CLARISSE Hill   Future Office Visit:      ACT Total Scores 5/5/2017 5/5/2017 2/16/2018   ACT TOTAL SCORE - - -   ASTHMA ER VISITS - - -   ASTHMA HOSPITALIZATIONS - - -   ACT TOTAL SCORE (Goal Greater than or Equal to 20) 18 18 15   In the past 12 months, how many times did you visit the emergency room for your asthma without being admitted to the hospital? 0 0 0   In the past 12 months, how many times were you hospitalized overnight because of your asthma? 0 0 0     Sepideh Caldera RT (R) (M)    "

## 2019-05-31 ENCOUNTER — TELEPHONE (OUTPATIENT)
Dept: FAMILY MEDICINE | Facility: CLINIC | Age: 64
End: 2019-05-31

## 2019-05-31 DIAGNOSIS — Z12.11 SPECIAL SCREENING FOR MALIGNANT NEOPLASMS, COLON: Primary | ICD-10-CM

## 2019-05-31 NOTE — TELEPHONE ENCOUNTER
Panel Management Review      Patient has the following on her problem list: None      Composite cancer screening  Chart review shows that this patient is due/due soon for the following Fecal Colorectal (FIT)  Summary:    Patient is due/failing the following:   FIT    Action needed:   Patient needs referral/order: FIT    Type of outreach:    Phone, spoke to patient.  patient stated mailed it last winter - agreed to complete another one since it is not recorded as done    Questions for provider review:    None                                                                                                                                    Jyoti Cyr MA

## 2019-06-24 DIAGNOSIS — J30.89 CHRONIC NON-SEASONAL ALLERGIC RHINITIS: ICD-10-CM

## 2019-06-25 RX ORDER — MONTELUKAST SODIUM 10 MG/1
TABLET ORAL
Qty: 30 TABLET | Refills: 0 | Status: SHIPPED | OUTPATIENT
Start: 2019-06-25 | End: 2019-07-23

## 2019-08-01 DIAGNOSIS — Z87.891 PERSONAL HISTORY OF TOBACCO USE: ICD-10-CM

## 2019-08-01 DIAGNOSIS — M1A.09X0 CHRONIC GOUT OF MULTIPLE SITES, UNSPECIFIED CAUSE: ICD-10-CM

## 2019-08-01 DIAGNOSIS — I10 BENIGN ESSENTIAL HYPERTENSION: ICD-10-CM

## 2019-08-01 DIAGNOSIS — J45.20 MILD INTERMITTENT ASTHMA WITHOUT COMPLICATION: Chronic | ICD-10-CM

## 2019-08-01 RX ORDER — FLUTICASONE PROPIONATE AND SALMETEROL 113; 14 UG/1; UG/1
1 POWDER, METERED RESPIRATORY (INHALATION) EVERY 12 HOURS
Qty: 1 INHALER | Refills: 0 | Status: SHIPPED | OUTPATIENT
Start: 2019-08-01 | End: 2019-10-11

## 2019-08-01 RX ORDER — ALLOPURINOL 100 MG/1
100 TABLET ORAL DAILY
Qty: 90 TABLET | Refills: 1 | Status: SHIPPED | OUTPATIENT
Start: 2019-08-01 | End: 2020-01-27

## 2019-08-01 RX ORDER — LISINOPRIL 2.5 MG/1
TABLET ORAL
Qty: 90 TABLET | Refills: 1 | Status: SHIPPED | OUTPATIENT
Start: 2019-08-01 | End: 2020-01-27

## 2019-08-01 NOTE — TELEPHONE ENCOUNTER
"Requested Prescriptions   Pending Prescriptions Disp Refills     allopurinol (ZYLOPRIM) 100 MG tablet [Pharmacy Med Name: ALLOPURINOL 100 MG TABLET] 90 tablet 1     Sig: Take 1 tablet (100 mg) by mouth daily       Gout Agents Protocol Failed - 8/1/2019  8:53 AM        Failed - CBC on file in past 12 months     Recent Labs   Lab Test 06/15/16  1030   WBC 4.7   RBC 4.12   HGB 14.6   HCT 43.4                    Failed - ALT on file in past 12 months     Recent Labs   Lab Test 06/22/18  0845   ALT 60*             Failed - Has Uric Acid on file in past 12 months and value is less than 6     Recent Labs   Lab Test 06/22/18  0845   URIC 5.9     If level is 6mg/dL or greater, ok to refill one time and refer to provider.           Failed - Normal serum creatinine on file in the past 12 months     Recent Labs   Lab Test 06/22/18  0845   CR 1.02             Passed - Recent (12 mo) or future (30 days) visit within the authorizing provider's specialty     Patient had office visit in the last 12 months or has a visit in the next 30 days with authorizing provider or within the authorizing provider's specialty.  See \"Patient Info\" tab in inbasket, or \"Choose Columns\" in Meds & Orders section of the refill encounter.      Last Written Prescription Date:  1/30/19  Last Fill Quantity: 90,  # refills: 1   Last office visit: 11/21/2018 with prescribing provider:     Future Office Visit:                Passed - Medication is active on med list        Passed - Patient is age 18 or older        Passed - No active pregnancy on record        Passed - No positive pregnancy test in past year        lisinopril (PRINIVIL/ZESTRIL) 2.5 MG tablet [Pharmacy Med Name: LISINOPRIL 2.5 MG TABLET] 90 tablet 1     Sig: Take 1 tablet (2.5 mg) by mouth daily       ACE Inhibitors (Including Combos) Protocol Failed - 8/1/2019  8:53 AM        Failed - Normal serum creatinine on file in past 12 months     Recent Labs   Lab Test 06/22/18  0845   CR 1.02 " "            Failed - Normal serum potassium on file in past 12 months     Recent Labs   Lab Test 06/22/18  0845   POTASSIUM 4.0             Passed - Blood pressure under 140/90 in past 12 months     BP Readings from Last 3 Encounters:   11/21/18 132/88   06/29/18 132/84   02/26/18 130/80                 Passed - Recent (12 mo) or future (30 days) visit within the authorizing provider's specialty     Patient had office visit in the last 12 months or has a visit in the next 30 days with authorizing provider or within the authorizing provider's specialty.  See \"Patient Info\" tab in inbasket, or \"Choose Columns\" in Meds & Orders section of the refill encounter.      Last Written Prescription Date:  1/30/19  Last Fill Quantity: 90,  # refills: 1   Last office visit: 11/21/2018 with prescribing provider:     Future Office Visit:                Passed - Medication is active on med list        Passed - Patient is age 18 or older        Passed - No active pregnancy on record        Passed - No positive pregnancy test within past 12 months          "

## 2019-08-01 NOTE — TELEPHONE ENCOUNTER
"Requested Prescriptions   Pending Prescriptions Disp Refills     fluticasone-salmeterol (AIRDUO RESPICLICK) 113-14 MCG/ACT inhaler [Pharmacy Med Name: FLUTICASONE-SALMETEROL 113-14 MCG AER POW BA]       Sig: Inhale 1 puff into the lungs every 12 hours       Inhaled Steroids Protocol Failed - 8/1/2019  8:03 AM        Failed - Asthma control assessment score within normal limits in last 6 months     Please review ACT score.     ACT Total Scores 5/5/2017 5/5/2017 2/16/2018   ACT TOTAL SCORE - - -   ASTHMA ER VISITS - - -   ASTHMA HOSPITALIZATIONS - - -   ACT TOTAL SCORE (Goal Greater than or Equal to 20) 18 18 15   In the past 12 months, how many times did you visit the emergency room for your asthma without being admitted to the hospital? 0 0 0   In the past 12 months, how many times were you hospitalized overnight because of your asthma? 0 0 0               Failed - Recent (6 mo) or future (30 days) visit within the authorizing provider's specialty     Patient had office visit in the last 6 months or has a visit in the next 30 days with authorizing provider or within the authorizing provider's specialty.  See \"Patient Info\" tab in inbasket, or \"Choose Columns\" in Meds & Orders section of the refill encounter.      Last Written Prescription Date:  6/29/18  Last Fill Quantity: 3,  # refills: 3   Last office visit: 11/21/2018 with prescribing provider:     Future Office Visit:              Passed - Patient is age 12 or older        Passed - Medication is active on med list          "

## 2019-10-11 DIAGNOSIS — J45.20 MILD INTERMITTENT ASTHMA WITHOUT COMPLICATION: Chronic | ICD-10-CM

## 2019-10-11 DIAGNOSIS — Z87.891 PERSONAL HISTORY OF TOBACCO USE: ICD-10-CM

## 2019-10-11 RX ORDER — FLUTICASONE PROPIONATE AND SALMETEROL 113; 14 UG/1; UG/1
1 POWDER, METERED RESPIRATORY (INHALATION) EVERY 12 HOURS
Qty: 1 INHALER | Refills: 0 | Status: SHIPPED | OUTPATIENT
Start: 2019-10-11 | End: 2019-10-25

## 2019-10-11 NOTE — TELEPHONE ENCOUNTER
"Requested Prescriptions   Pending Prescriptions Disp Refills     fluticasone-salmeterol (AIRDUO RESPICLICK) 113-14 MCG/ACT inhaler [Pharmacy Med Name: FLUTICASONE-SALMETEROL 113-14 MCG AER POW BA]       Sig: Inhale 1 puff into the lungs every 12 hours       Inhaled Steroids Protocol Failed - 10/11/2019 10:47 AM        Failed - Asthma control assessment score within normal limits in last 6 months     Please review ACT score.           Failed - Recent (6 mo) or future (30 days) visit within the authorizing provider's specialty     Patient had office visit in the last 6 months or has a visit in the next 30 days with authorizing provider or within the authorizing provider's specialty.  See \"Patient Info\" tab in inbasket, or \"Choose Columns\" in Meds & Orders section of the refill encounter.            Passed - Patient is age 12 or older        Passed - Medication is active on med list        Spoke with patient, aware that she needs an appointment and will call back to schedule.  Iludmila refill given x 1.    Shaista Carter RN    "

## 2019-10-14 ENCOUNTER — TELEPHONE (OUTPATIENT)
Dept: FAMILY MEDICINE | Facility: CLINIC | Age: 64
End: 2019-10-14

## 2019-10-14 NOTE — LETTER
[facility]  [address] [facility] [address]  [phone]       October 14, 2019    Fabi Julianjimenez  [address] [facility] [address]    Dear Fabi,    We care about your health and have reviewed your health plan and are making recommendations based on this review, to optimize your health.    You are in particular need of attention regarding:  -Colon Cancer Screening  -Wellness (Physical) Visit     We are recommending that you:                                              -schedule a WELLNESS (Physical) APPOINTMENT. We will check fasting labs the same day. You should have nothing to eat except water and meds for 8-10 hours prior.                                                                                                                                            -schedule a COLONOSCOPY to look for colon cancer (due every 10 years or 5 years in higher risk situations.)        Colon cancer is now the second leading cause of cancer-related deaths in the [place] for both men and women and there are over 130,000 new cases and 50,000 deaths per year from colon cancer.  Colonoscopies can prevent 90-95% of these deaths.  Problem lesions can be removed before they ever become cancer.  This test is not only looking for cancer, but also getting rid of precancerious lesions.    If you are under/uninsured, we recommend you contact the [employer] program. [employer] is a free colorectal cancer screening program that provides colonoscopies for eligible under/uninsured [place] men and women. If you are interested in receiving a free colonoscopy, please call [facility] at [phone] (mention code [employer]) to see if you re eligible.     If you do not wish to do a colonoscopy or cannot afford to do one, at this time, there is another option. It is called a FIT test or Fecal Immunochemical Occult Blood Test (take home stool sample kit).  It does not replace the colonoscopy  for colorectal cancer screening, but it can detect hidden bleeding in the lower colon.  It does need to be repeated every year and if a positive result is obtained, you would be referred for a colonoscopy.       If you have completed either one of these tests at another facility, please call with the details of when and where the tests were done and if they were normal or not. Or have the records sent to our clinic so that we can best coordinate your care.                                                                                                                                                   In addition, here is a list of due or overdue Health Maintenance reminders.    Health Maintenance Due   Topic Date Due     Hepatitis C Screening  1955     FIT Test  02/15/1966     HIV Screening  02/15/1971     Pneumococcal Vaccine (1 of 1 - PPSV23) 02/15/1975     Zoster (Shingles) Vaccine (1 of 2) 02/15/2006     Preventive Care Visit  06/15/2017     Discuss Advance Care Planning  09/21/2017     Asthma Control Test  08/16/2018     Depression Assessment  08/16/2018     Asthma Action Plan - yearly  10/17/2018     Anxiety Assessment  02/16/2019     Flu Vaccine (1) 09/01/2019       To address the above recommendations, we encourage you to contact us at [phone], via [employer] or by contacting Central Scheduling toll free at [phone] 24 hours a day. They will assist you with finding the most convenient time and location..    Thank you for trusting [facility] and we appreciate the opportunity to serve you.  We look forward to supporting your healthcare needs in the future.    Healthy Regards,    Your [facility] Team

## 2019-10-14 NOTE — TELEPHONE ENCOUNTER
Panel Management Review      Patient has the following on her problem list: None      Composite cancer screening  Chart review shows that this patient is due/due soon for the following Fecal Colorectal (FIT)  Summary:    Patient is due/failing the following:   FIT    Action needed:   FIt at home    Type of outreach:    Sent letter.    Questions for provider review:    None                                                                                                                                    Jyoti Cyr MA

## 2019-10-24 NOTE — PATIENT INSTRUCTIONS
1. Benign essential hypertension  Chronic, stable  - Comprehensive metabolic panel  - metoprolol tartrate (LOPRESSOR) 50 MG tablet; Take 1 tablet (50 mg) by mouth daily  Dispense: 90 tablet; Refill: 3    2. Major depressive disorder, recurrent episode, mild (HCC)  Chronic, stable  - TSH with free T4 reflex  - Vitamin B12  - buPROPion (WELLBUTRIN SR) 150 MG 12 hr tablet; Take 1 tablet (150 mg) by mouth 2 times daily  Dispense: 180 tablet; Refill: 5    3. Mild intermittent asthma without complication  Chronic, stable  - fluticasone-salmeterol (AIRDUO RESPICLICK) 113-14 MCG/ACT inhaler; Inhale 1 puff into the lungs every 12 hours  Dispense: 1 Inhaler; Refill: 5  - montelukast (SINGULAIR) 10 MG tablet; Take 1 tablet (10 mg) by mouth At Bedtime  Dispense: 90 tablet; Refill: 3    4. CARDIOVASCULAR SCREENING; LDL GOAL LESS THAN 130  Screening  - Lipid panel reflex to direct LDL Fasting; Future: schedule a fasting lab only for this    5. Chronic non-seasonal allergic rhinitis  Chronic, stable  - montelukast (SINGULAIR) 10 MG tablet; Take 1 tablet (10 mg) by mouth At Bedtime  Dispense: 90 tablet; Refill: 3    6. Personal history of tobacco use  Chronic, stable    7. Tobacco use disorder  Recommendation for smoking cessation. Please let us know if we can be of any help  QUITPLAN: 2-071-969-PLAN (4488)  Everyone in [place] has access to free telephone helpline support to quit tobacco either through their health plan or through [employer] Services. The QUITPLAN web program is free to everyone regardless of coverage.   The QUITPLAN Helpline, including gum, patches, lozenges, is free to the uninsured and those without coverage.   - SMOKING CESSATION COUNSELING 3-10 MIN    8. Encounter for smoking cessation counseling  Recommendation for smoking cessation. Please let us know if we can be of any help  QUITPLAN: 9-560-332-PLAN ([phone])  Everyone in [place] has access to free telephone helpline support to quit tobacco either  through their health plan or through [employer]. The [employer] web program is free to everyone regardless of coverage.   The QUITPLAN Helpline, including gum, patches, lozenges, is free to the uninsured and those without coverage.     9. Advanced directives, counseling/discussion  Complete Honoring Choices and drop off at   - C ADVANCE CARE PLANNING FIRST 30 MINS    10. Healthcare maintenance  Due: Flu shot, Pneumococcal  Check with [facility] for Shingles    11. Seborrheic keratosis  Chronic, stable    12. Atypical mole  Chronic, stable  Come in for mole removal- shave biopsy (40 min visit)      Patient Education     Monthly Mole Check Chart  Anyone can get skin cancer. It doesn t matter what your skin color is. Doing a monthly skin check is an important way to spot early signs of melanoma. Melanoma is the deadliest type of skin cancer. But if it s found early, it can be treated. Regular skin checks can help you track any changes in your skin.  Getting started  Each month, check your body for any spots such as freckles, age spots, and moles. Use this sheet to help you by doing the followin. Number each spot you find on the images below.  2. Record the details for each spot, along with the date, on the chart at the bottom of the page.  3. Keep all of your completed charts. This will help you track any changes in your skin over time.  What to look for  When doing a skin check, be sure to use the ABCDEs of melanoma. This means checking spots and moles for the following:      Asymmetry. One half of the mole is not like the other half.    Border. The edges are not smooth but ragged, notched, or blurred.    Color. Color varies from 1 part of the mole to another and may be tan, brown, or black. In some cases the color can be white, red, or blue.    Diameter. The mole is larger than 6 mm (size of a pencil eraser).    Evolving. The mole is getting larger or changing its shape or color.  How to check  Stand  before a full-length mirror and check all parts of your body. For spots on your back or other areas you can't see, have a family member or friend do this for you. Or you can also use a hand mirror to check hard-to-see areas such as your back, buttocks, back of the neck, and scalp. To get a better look when checking your scalp, part your hair.  When to call your healthcare provider  Call your healthcare provider if any of your moles:    Hurt    Itch    Ooze    Bleed    Thicken    Become crusty    Show any of the ABCDEs of melanoma  Monthly Skin Check Chart  Date       Mole #    Asymmetry:  What is the mole's shape? Border of mole Color of mole Diameter of mole Evolving: How has mole changed?                                                                                                   Date Last Reviewed: 3/1/2017    1979-2619 [employer]. [address] [place] [address], [place] [address]. All rights reserved. This information is not intended as a substitute for professional medical care. Always follow your healthcare professional's instructions.           Patient Education     Understanding Seborrheic Keratosis  Seborrheic keratoses are wart-like growths on the skin. These growths are not cancer. Many people get them, especially after age 30.  How to say it  [id number]   What causes seborrheic keratoses?  Doctors do not know what causes seborrheic keratoses. They may run in families. In addition, they become more common as people get older.  What are the symptoms of seborrheic keratoses?  Here is what seborrheic keratoses often look like:    They tend to be round or oval in shape. They can be very small or about as big across as a quarter.    They can appear singly or in clusters.    They tend to be tan, brown, or black in color.    The edges may be scalloped or uneven-looking.    They can have a waxy or scaly look.    They can be a bit raised, appearing to be  stuck on  the skin.    They  may become red and irritated if scratched or rubbed by clothing  Sebhorreic keratoses are not painful, but they may be itchy. They can become irritated if they are continually rubbed by skin or clothing. Seborrheic keratoses most often appear on the face, arms, chest, back, or belly.  How are seborrheic keratoses treated?  Seborrheic keratoses don t need to be treated unless they bother you. You may choose to have them removed because you find them unattractive. You may also want them removed because they get irritated and uncomfortable. A healthcare provider can remove them in an office visit. Ways that seborrheic keratoses can be removed include:    Freezing them off with liquid nitrogen    Cutting them off with a scalpel    Burning them off with electricity  What are the complications of seborrheic keratoses?  Seborrheic keratoses are not cancer, but they can look like some types of skin cancer. So it s a good idea to ask your healthcare provider to check any new skin growths. Ask your healthcare provider about any skin problem that concerns you.  When should I call my healthcare provider?  Call your healthcare provider right away if any of these occur:    You develop a lot of seborrheic keratoses very quickly    You have a sore that does not heal within a few weeks, or heals and then comes back    You have a mole or skin growth that is changing in size, shape, or color    You have a mole or skin growth that looks different on one side from the other    You have a mole or skin growth that is not the same color throughout   Date Last Reviewed: 5/1/2016 2000-2018 [employer]. [address] [place] [address], [place] [address]. All rights reserved. This information is not intended as a substitute for professional medical care. Always follow your healthcare professional's instructions.

## 2019-10-25 ENCOUNTER — OFFICE VISIT (OUTPATIENT)
Dept: FAMILY MEDICINE | Facility: CLINIC | Age: 64
End: 2019-10-25
Payer: COMMERCIAL

## 2019-10-25 VITALS
TEMPERATURE: 98.1 F | HEIGHT: 64 IN | BODY MASS INDEX: 24.17 KG/M2 | OXYGEN SATURATION: 98 % | SYSTOLIC BLOOD PRESSURE: 146 MMHG | DIASTOLIC BLOOD PRESSURE: 84 MMHG | WEIGHT: 141.6 LBS | HEART RATE: 79 BPM

## 2019-10-25 DIAGNOSIS — I10 BENIGN ESSENTIAL HYPERTENSION: Primary | Chronic | ICD-10-CM

## 2019-10-25 DIAGNOSIS — L82.1 SEBORRHEIC KERATOSIS: ICD-10-CM

## 2019-10-25 DIAGNOSIS — Z87.891 PERSONAL HISTORY OF TOBACCO USE: ICD-10-CM

## 2019-10-25 DIAGNOSIS — D22.9 ATYPICAL MOLE: ICD-10-CM

## 2019-10-25 DIAGNOSIS — Z12.11 SPECIAL SCREENING FOR MALIGNANT NEOPLASMS, COLON: ICD-10-CM

## 2019-10-25 DIAGNOSIS — F33.0 MAJOR DEPRESSIVE DISORDER, RECURRENT EPISODE, MILD (H): ICD-10-CM

## 2019-10-25 DIAGNOSIS — J45.20 MILD INTERMITTENT ASTHMA WITHOUT COMPLICATION: Chronic | ICD-10-CM

## 2019-10-25 DIAGNOSIS — F17.200 TOBACCO USE DISORDER: Chronic | ICD-10-CM

## 2019-10-25 DIAGNOSIS — Z71.6 ENCOUNTER FOR SMOKING CESSATION COUNSELING: ICD-10-CM

## 2019-10-25 DIAGNOSIS — Z23 NEED FOR PROPHYLACTIC VACCINATION AND INOCULATION AGAINST INFLUENZA: ICD-10-CM

## 2019-10-25 DIAGNOSIS — Z00.00 HEALTHCARE MAINTENANCE: ICD-10-CM

## 2019-10-25 DIAGNOSIS — Z71.89 ADVANCED DIRECTIVES, COUNSELING/DISCUSSION: ICD-10-CM

## 2019-10-25 DIAGNOSIS — J30.89 CHRONIC NON-SEASONAL ALLERGIC RHINITIS: ICD-10-CM

## 2019-10-25 DIAGNOSIS — Z13.6 CARDIOVASCULAR SCREENING; LDL GOAL LESS THAN 130: ICD-10-CM

## 2019-10-25 PROCEDURE — 90472 IMMUNIZATION ADMIN EACH ADD: CPT | Performed by: NURSE PRACTITIONER

## 2019-10-25 PROCEDURE — 90471 IMMUNIZATION ADMIN: CPT | Performed by: NURSE PRACTITIONER

## 2019-10-25 PROCEDURE — 90682 RIV4 VACC RECOMBINANT DNA IM: CPT | Performed by: NURSE PRACTITIONER

## 2019-10-25 PROCEDURE — 99214 OFFICE O/P EST MOD 30 MIN: CPT | Mod: 25 | Performed by: NURSE PRACTITIONER

## 2019-10-25 PROCEDURE — 99406 BEHAV CHNG SMOKING 3-10 MIN: CPT | Mod: 25 | Performed by: NURSE PRACTITIONER

## 2019-10-25 PROCEDURE — 82274 ASSAY TEST FOR BLOOD FECAL: CPT | Performed by: NURSE PRACTITIONER

## 2019-10-25 PROCEDURE — 90732 PPSV23 VACC 2 YRS+ SUBQ/IM: CPT | Performed by: NURSE PRACTITIONER

## 2019-10-25 RX ORDER — FLUTICASONE PROPIONATE AND SALMETEROL 113; 14 UG/1; UG/1
1 POWDER, METERED RESPIRATORY (INHALATION) EVERY 12 HOURS
Qty: 1 INHALER | Refills: 5 | Status: SHIPPED | OUTPATIENT
Start: 2019-10-25 | End: 2019-12-27

## 2019-10-25 RX ORDER — BUPROPION HYDROCHLORIDE 150 MG/1
150 TABLET, EXTENDED RELEASE ORAL 2 TIMES DAILY
Qty: 180 TABLET | Refills: 5 | Status: SHIPPED | OUTPATIENT
Start: 2019-10-25 | End: 2020-07-31

## 2019-10-25 RX ORDER — METOPROLOL TARTRATE 50 MG
50 TABLET ORAL DAILY
Qty: 90 TABLET | Refills: 3 | Status: SHIPPED | OUTPATIENT
Start: 2019-10-25 | End: 2020-12-15

## 2019-10-25 RX ORDER — MONTELUKAST SODIUM 10 MG/1
TABLET ORAL
Qty: 90 TABLET | Refills: 3 | Status: SHIPPED | OUTPATIENT
Start: 2019-10-25 | End: 2020-10-20

## 2019-10-25 ASSESSMENT — ANXIETY QUESTIONNAIRES
6. BECOMING EASILY ANNOYED OR IRRITABLE: NOT AT ALL
GAD7 TOTAL SCORE: 0
4. TROUBLE RELAXING: NOT AT ALL
5. BEING SO RESTLESS THAT IT IS HARD TO SIT STILL: NOT AT ALL
GAD7 TOTAL SCORE: 0
2. NOT BEING ABLE TO STOP OR CONTROL WORRYING: NOT AT ALL
1. FEELING NERVOUS, ANXIOUS, OR ON EDGE: NOT AT ALL
3. WORRYING TOO MUCH ABOUT DIFFERENT THINGS: NOT AT ALL
7. FEELING AFRAID AS IF SOMETHING AWFUL MIGHT HAPPEN: NOT AT ALL
7. FEELING AFRAID AS IF SOMETHING AWFUL MIGHT HAPPEN: NOT AT ALL
GAD7 TOTAL SCORE: 0

## 2019-10-25 ASSESSMENT — PATIENT HEALTH QUESTIONNAIRE - PHQ9
SUM OF ALL RESPONSES TO PHQ QUESTIONS 1-9: 0
SUM OF ALL RESPONSES TO PHQ QUESTIONS 1-9: 0

## 2019-10-25 ASSESSMENT — MIFFLIN-ST. JEOR: SCORE: 1180.67

## 2019-10-25 NOTE — NURSING NOTE
Prior to immunization administration, verified patients identity using patient s name and date of birth. Please see Immunization Activity for additional information.     Screening Questionnaire for Adult Immunization    Are you sick today?   No   Do you have allergies to medications, food, a vaccine component or latex?   No   Have you ever had a serious reaction after receiving a vaccination?   No   Do you have a long-term health problem with heart disease, lung disease, asthma, kidney disease, metabolic disease (e.g. diabetes), anemia, or other blood disorder?   No   Do you have cancer, leukemia, HIV/AIDS, or any other immune system problem?   No   In the past 3 months, have you taken medications that affect  your immune system, such as prednisone, other steroids, or anticancer drugs; drugs for the treatment of rheumatoid arthritis, Crohn s disease, or psoriasis; or have you had radiation treatments?   No   Have you had a seizure, or a brain or other nervous system problem?   No   During the past year, have you received a transfusion of blood or blood     products, or been given immune (gamma) globulin or antiviral drug?   No   For women: Are you pregnant or is there a chance you could become        pregnant during the next month?   No   Have you received any vaccinations in the past 4 weeks?   No     Immunization questionnaire answers were all negative.        Per orders of PJ GALEANA, injection of INFLUENZA AND PNEUMONIA given by Sienna Malone CMA. Patient instructed to remain in clinic for 15 minutes afterwards, and to report any adverse reaction to me immediately.       Screening performed by Sienna Malone CMA on 10/25/2019 at 2:20 PM.

## 2019-10-25 NOTE — LETTER
My Asthma Action Plan    Name: Fabi Rizzo   YOB: 1955  Date: 10/25/2019   My doctor: Terri Hill CNP   My clinic: [facility]        Controller Medicine:  Airduo Respiclick 113-14 inhaler: 1 puff twice daily  My Rescue Medicine:   Albuterol inhaler (Proair/Ventolin/Proventil HFA)  2-4 puffs EVERY 4 HOURS as needed. Use a spacer if recommended by your provider.   My Asthma Severity:   Intermittent / Exercise Induced  Know your asthma triggers: smoke and upper respiratory infections  burning wood in wood stove/furnace          GREEN ZONE   Good Control    I feel good    No cough or wheeze    Can work, sleep and play without asthma symptoms       Take your asthma control medicine every day.     1. If exercise triggers your asthma, take your rescue medication    15 minutes before exercise or sports, and    During exercise if you have asthma symptoms  2. Spacer to use with inhaler: If you have a spacer, make sure to use it with your inhaler             YELLOW ZONE Getting Worse  I have ANY of these:    I do not feel good    Cough or wheeze    Chest feels tight    Wake up at night   1. Keep taking your Green Zone medications  2. Start taking your rescue medicine:    every 20 minutes for up to 1 hour. Then every 4 hours for 24-48 hours.  3. If you stay in the Yellow Zone for more than 12-24 hours, contact your doctor.  4. If you do not return to the Green Zone in 12-24 hours or you get worse, start taking your oral steroid medicine if prescribed by your provider.           RED ZONE Medical Alert - Get Help  I have ANY of these:    I feel awful    Medicine is not helping    Breathing getting harder    Trouble walking or talking    Nose opens wide to breathe       1. Take your rescue medicine NOW  2. If your provider has prescribed an oral steroid medicine, start taking it NOW  3. Call your doctor NOW  4. If you are still in the Red Zone after 20 minutes and you have not  reached your doctor:    Take your rescue medicine again and    Call 911 or go to the emergency room right away    See your regular doctor within 2 weeks of an Emergency Room or Urgent Care visit for follow-up treatment.          Annual Reminders:  Meet with Asthma Educator,  Flu Shot in the Fall, consider Pneumonia Vaccination for patients with asthma (aged 19 and older).    Pharmacy:    [id number] - [place], [place] - [address] [facility] - [place], [place] - [address] [facility], [facility] [address]                        Asthma Triggers  How To Control Things That Make Your Asthma Worse    Triggers are things that make your asthma worse.  Look at the list below to help you find your triggers and   what you can do about them. You can help prevent asthma flare-ups by staying away from your triggers.      Trigger                                                          What you can do   Cigarette Smoke  Tobacco smoke can make asthma worse. Do not allow smoking in your home, car or around you.  Be sure no one smokes at a child s day care or school.  If you smoke, ask your health care provider for ways to help you quit.  Ask family members to quit too.  Ask your health care provider for a referral to Quit Plan to help you quit smoking, or call 6-180-623-PLAN.     Colds, Flu, Bronchitis  These are common triggers of asthma. Wash your hands often.  Don t touch your eyes, nose or mouth.  Get a flu shot every year.     Dust Mites  These are tiny bugs that live in cloth or carpet. They are too small to see. Wash sheets and blankets in hot water every week.   Encase pillows and mattress in dust mite proof covers.  Avoid having carpet if you can. If you have carpet, vacuum weekly.   Use a dust mask and HEPA vacuum.   Pollen and Outdoor Mold  Some people are allergic to trees, grass, or weed pollen, or molds. Try to keep your windows closed.  Limit time out doors when pollen count is high.   Ask you  health care provider about taking medicine during allergy season.     Animal Dander  Some people are allergic to skin flakes, urine or saliva from pets with fur or feathers. Keep pets with fur or feathers out of your home.    If you can t keep the pet outdoors, then keep the pet out of your bedroom.  Keep the bedroom door closed.  Keep pets off cloth furniture and away from stuffed toys.     Mice, Rats, and Cockroaches  Some people are allergic to the waste from these pests.   Cover food and garbage.  Clean up spills and food crumbs.  Store grease in the refrigerator.   Keep food out of the bedroom.   Indoor Mold  This can be a trigger if your home has high moisture. Fix leaking faucets, pipes, or other sources of water.   Clean moldy surfaces.  Dehumidify basement if it is damp and smelly.   Smoke, Strong Odors, and Sprays  These can reduce air quality. Stay away from strong odors and sprays, such as perfume, powder, hair spray, paints, smoke incense, paint, cleaning products, candles and new carpet.   Exercise or Sports  Some people with asthma have this trigger. Be active!  Ask your doctor about taking medicine before sports or exercise to prevent symptoms.    Warm up for 5-10 minutes before and after sports or exercise.     Other Triggers of Asthma  Cold air:  Cover your nose and mouth with a scarf.  Sometimes laughing or crying can be a trigger.  Some medicines and food can trigger asthma.

## 2019-10-26 ASSESSMENT — ASTHMA QUESTIONNAIRES: ACT_TOTALSCORE: 20

## 2019-10-26 ASSESSMENT — ANXIETY QUESTIONNAIRES: GAD7 TOTAL SCORE: 0

## 2019-10-31 NOTE — PROGRESS NOTES
SUBJECTIVE   Fabi Rizzo is a  female who presents to clinic today for the following health issue(s):       Atypical- shave biopsy      Duration: moles have been present x 5 years    Description (location/character/radiation): mid/low back and on chest.     Intensity:  0/10    Accompanying signs and symptoms: none but cannot see if there have been any changes.     History (similar episodes/previous evaluation): None    Precipitating or alleviating factors: None    Therapies tried and outcome: None       ADDRESS ALL HEALTH MAINTENANCE NEEDS- Place orders as needed  Health Maintenance Due   Topic Date Due     HEPATITIS C SCREENING  1955     FIT  02/15/1966     HIV SCREENING  02/15/1971     ZOSTER IMMUNIZATION (1 of 2) 02/15/2006     PREVENTIVE CARE VISIT  06/15/2017       PCP   Terri Hill, [facility] [phone]    PROBLEM LIST        Patient Active Problem List   Diagnosis     CARDIOVASCULAR SCREENING; LDL GOAL LESS THAN 130     Benign essential hypertension     Intermittent asthma     GERD (gastroesophageal reflux disease)     Major depressive disorder, recurrent episode, mild (HCC)     Advanced directives, counseling/discussion     Alcohol use     Chronic low back pain     Spondylolisthesis of lumbar region     Lumbar foraminal stenosis     Chronic rhinitis     Chronic gout of multiple sites, unspecified cause     Tobacco use disorder     Varicose veins of both lower extremities     Healthcare maintenance     Encounter for smoking cessation counseling     Seborrheic keratosis     Atypical mole       MEDICATIONS        Current Outpatient Medications   Medication     albuterol (PROAIR HFA/PROVENTIL HFA/VENTOLIN HFA) 108 (90 Base) MCG/ACT inhaler     allopurinol (ZYLOPRIM) 100 MG tablet     aspirin (ASA) 81 MG tablet     buPROPion (WELLBUTRIN SR) 150 MG 12 hr tablet     CALCIUM 600 MG PO TABS     colchicine (COLCRYS) 0.6 MG tablet     fluticasone-salmeterol (AIRDUO RESPICLICK) 113-14 MCG/ACT  "inhaler     lisinopril (PRINIVIL/ZESTRIL) 2.5 MG tablet     metoprolol tartrate (LOPRESSOR) 50 MG tablet     montelukast (SINGULAIR) 10 MG tablet     MULTIVITAMINS PO TABS     VITAMIN D3 1000 UNIT PO CAPS     nicotine (NICODERM CQ) 14 MG/24HR 24 hr patch     order for DME     ORDER FOR DME     No current facility-administered medications for this visit.        Reviewed and updated as needed this visit by Provider:  Tobacco  Allergies  Meds  Med Hx  Surg Hx  Fam Hx  Soc Hx     ROS      Constitutional, HEENT, cardiovascular, pulmonary, gi and gu systems are negative, except as otherwise noted.    PHYSICAL EXAM   /74 (BP Location: Right arm, Patient Position: Sitting, Cuff Size: Adult Regular)   Pulse 77   Temp 97.9  F (36.6  C) (Tympanic)   Resp 20   Ht 1.626 m (5' 4\")   Wt 63.5 kg (140 lb)   SpO2 98%   BMI 24.03 kg/m    Body mass index is 24.03 kg/m .  GENERAL APPEARANCE: healthy, alert and no distress  RESP: lungs clear to auscultation - no rales, rhonchi or wheezes  CV: regular rates and rhythm, normal S1 S2, no S3 or S4 and no murmur, click or rub  ABDOMEN: soft, nontender, without hepatosplenomegaly or masses and bowel sounds normal  MS: extremities normal- no gross deformities noted  SKIN: multiple raised patches of seborrheic keratoses to back, and arms  Underside of right breast: slightly raised, irregular border, brown, with darkened area, 1 cm X 8 cm  PSYCH: mentation appears normal and affect normal/bright          Procedure: Shave Biopsy x 1. Location: underneath right breast  Clinical Diagnosis: atypical mole  Full discussion about risks, bennefits and alternatives to removal has been performed.  Including but not limited to the risk of bleeding, infection and scar formation.  The patient agrees to proceed with the procedure and consent obtained verbally.     The area surrounding the lesion(s) was cleaned and  prepped in a sterile fashion. Area is cleaned and prepped in a sterile " fashion, 3cc of 1%lidocaine with epinepherine has been injected for anesthesia.     The lesion(s) was  biopsied with a blue blade. The specimen was sent to Pathology. Drysolwas used for hemostasis with minimal blood loss. The wound(s) was  dressed with bacitracin ointment and a bandage. Wound care instructions were explained and a wound care sheet was given. Follow up with any wound problems, poor healing or infection.       ASSESSMENT & PLAN     1. Atypical mole  Chronic, stable  - HC TANGENTIAL BIOPSY OF SKIN, FIRST LESION  - Surgical pathology exam  Shave Biopsy   1. Keep Band-Aid on for 24 hours.  2. Use warm soapy water to clean. Pat dry.  3. You may use antibacterial ointment on the wound, and keep covered  4. Monitor for signs of infection- redness, discharge, pain at site. It this occurs, return to clinic for assessment  5. Pathology results will be sent to you.  6. If you need further removal, we will refer you to Dermatology.    2. Major depressive disorder, recurrent episode, mild (HCC)  Chronic, stable  - Vitamin B12  - **TSH with free T4 reflex FUTURE anytime    3. Benign essential hypertension  Chronic, stable  - **Comprehensive metabolic panel FUTURE anytime    4. CARDIOVASCULAR SCREENING; LDL GOAL LESS THAN 130  Screening  - Lipid panel reflex to direct LDL Fasting        Risks, benefits, side effects and rationale for treatment plan fully discussed with the patient and understanding expressed.    Terri Hill, FNP-[facility]

## 2019-11-01 ENCOUNTER — OFFICE VISIT (OUTPATIENT)
Dept: FAMILY MEDICINE | Facility: CLINIC | Age: 64
End: 2019-11-01
Payer: COMMERCIAL

## 2019-11-01 VITALS
OXYGEN SATURATION: 98 % | SYSTOLIC BLOOD PRESSURE: 126 MMHG | TEMPERATURE: 97.9 F | WEIGHT: 140 LBS | RESPIRATION RATE: 20 BRPM | HEIGHT: 64 IN | HEART RATE: 77 BPM | DIASTOLIC BLOOD PRESSURE: 74 MMHG | BODY MASS INDEX: 23.9 KG/M2

## 2019-11-01 DIAGNOSIS — Z13.6 CARDIOVASCULAR SCREENING; LDL GOAL LESS THAN 130: ICD-10-CM

## 2019-11-01 DIAGNOSIS — I10 BENIGN ESSENTIAL HYPERTENSION: Chronic | ICD-10-CM

## 2019-11-01 DIAGNOSIS — D22.9 ATYPICAL MOLE: Primary | ICD-10-CM

## 2019-11-01 DIAGNOSIS — F33.0 MAJOR DEPRESSIVE DISORDER, RECURRENT EPISODE, MILD (H): ICD-10-CM

## 2019-11-01 LAB
ALBUMIN SERPL-MCNC: 4 G/DL (ref 3.4–5)
ALP SERPL-CCNC: 87 U/L (ref 40–150)
ALT SERPL W P-5'-P-CCNC: 70 U/L (ref 0–50)
ANION GAP SERPL CALCULATED.3IONS-SCNC: 3 MMOL/L (ref 3–14)
AST SERPL W P-5'-P-CCNC: 107 U/L (ref 0–45)
BILIRUB SERPL-MCNC: 0.3 MG/DL (ref 0.2–1.3)
BUN SERPL-MCNC: 21 MG/DL (ref 7–30)
CALCIUM SERPL-MCNC: 9.8 MG/DL (ref 8.5–10.1)
CHLORIDE SERPL-SCNC: 104 MMOL/L (ref 94–109)
CHOLEST SERPL-MCNC: 242 MG/DL
CO2 SERPL-SCNC: 28 MMOL/L (ref 20–32)
CREAT SERPL-MCNC: 1 MG/DL (ref 0.52–1.04)
GFR SERPL CREATININE-BSD FRML MDRD: 60 ML/MIN/{1.73_M2}
GLUCOSE SERPL-MCNC: 95 MG/DL (ref 70–99)
HDLC SERPL-MCNC: 67 MG/DL
LDLC SERPL CALC-MCNC: 98 MG/DL
NONHDLC SERPL-MCNC: 175 MG/DL
POTASSIUM SERPL-SCNC: 4.2 MMOL/L (ref 3.4–5.3)
PROT SERPL-MCNC: 8.2 G/DL (ref 6.8–8.8)
SODIUM SERPL-SCNC: 135 MMOL/L (ref 133–144)
TRIGL SERPL-MCNC: 383 MG/DL
TSH SERPL DL<=0.005 MIU/L-ACNC: 2.88 MU/L (ref 0.4–4)
VIT B12 SERPL-MCNC: 596 PG/ML (ref 193–986)

## 2019-11-01 PROCEDURE — 80061 LIPID PANEL: CPT | Performed by: NURSE PRACTITIONER

## 2019-11-01 PROCEDURE — 36415 COLL VENOUS BLD VENIPUNCTURE: CPT | Performed by: NURSE PRACTITIONER

## 2019-11-01 PROCEDURE — 82607 VITAMIN B-12: CPT | Performed by: NURSE PRACTITIONER

## 2019-11-01 PROCEDURE — 99214 OFFICE O/P EST MOD 30 MIN: CPT | Mod: 25 | Performed by: NURSE PRACTITIONER

## 2019-11-01 PROCEDURE — 84443 ASSAY THYROID STIM HORMONE: CPT | Performed by: NURSE PRACTITIONER

## 2019-11-01 PROCEDURE — 11102 TANGNTL BX SKIN SINGLE LES: CPT | Performed by: NURSE PRACTITIONER

## 2019-11-01 PROCEDURE — 80053 COMPREHEN METABOLIC PANEL: CPT | Performed by: NURSE PRACTITIONER

## 2019-11-01 PROCEDURE — 88305 TISSUE EXAM BY PATHOLOGIST: CPT | Performed by: NURSE PRACTITIONER

## 2019-11-01 ASSESSMENT — MIFFLIN-ST. JEOR: SCORE: 1175.04

## 2019-11-01 NOTE — LETTER
November 4, 2019      Fabi Rizzo  [address] [facility] [id number]      Dear Fabi,      We are writing to inform you of your test results.    Vitamin B12 normal   TSH (thyroid) normal   CMP normal, except your liver function has worsened from 2 years ago   Lipids- your total cholesterol is elevated, your triglycerides are high, your HDL (good cholesterol) is fine, and LDL (bad cholesterol) is fine.   Your 10 year risk of heart attack and stroke is elevated at 11.4%. I'd like you to schedule an office visit to further discuss these labs. We could do a telephone visit, if you'd prefer.   The 10-year ASCVD risk score (Jeff RODRÍGUEZ Jr., et al., 2013) is: 11.4%     Values used to calculate the score:       Age: 64 years       Sex: Female       Is Non- : No       Diabetic: No       Tobacco smoker: Yes       Systolic Blood Pressure: 126 mmHg       Is BP treated: Yes       HDL Cholesterol: 67 mg/dL       Total Cholesterol: 242 mg/dL     Resulted Orders   Vitamin B12   Result Value Ref Range    Vitamin B12 596 193 - 986 pg/mL   **TSH with free T4 reflex FUTURE anytime   Result Value Ref Range    TSH 2.88 0.40 - 4.00 mU/L   **Comprehensive metabolic panel FUTURE anytime   Result Value Ref Range    Sodium 135 133 - 144 mmol/L    Potassium 4.2 3.4 - 5.3 mmol/L    Chloride 104 94 - 109 mmol/L    Carbon Dioxide 28 20 - 32 mmol/L    Anion Gap 3 3 - 14 mmol/L    Glucose 95 70 - 99 mg/dL      Comment:      Fasting specimen    Urea Nitrogen 21 7 - 30 mg/dL    Creatinine 1.00 0.52 - 1.04 mg/dL    GFR Estimate 60 (L) >60 mL/min/[1.73_m2]      Comment:      Non  GFR Calc  Starting 12/18/2018, serum creatinine based estimated GFR (eGFR) will be   calculated using the Chronic Kidney Disease Epidemiology Collaboration   (CKD-EPI) equation.      GFR Estimate If Black 69 >60 mL/min/[1.73_m2]      Comment:       GFR Calc  Starting 12/18/2018, serum creatinine based estimated  GFR (eGFR) will be   calculated using the Chronic Kidney Disease Epidemiology Collaboration   (CKD-EPI) equation.      Calcium 9.8 8.5 - 10.1 mg/dL    Bilirubin Total 0.3 0.2 - 1.3 mg/dL    Albumin 4.0 3.4 - 5.0 g/dL    Protein Total 8.2 6.8 - 8.8 g/dL    Alkaline Phosphatase 87 40 - 150 U/L    ALT 70 (H) 0 - 50 U/L     (H) 0 - 45 U/L   Lipid panel reflex to direct LDL Fasting   Result Value Ref Range    Cholesterol 242 (H) <200 mg/dL      Comment:      Desirable:       <200 mg/dl    Triglycerides 383 (H) <150 mg/dL      Comment:      Borderline high:  150-199 mg/dl  High:             200-499 mg/dl  Very high:       >499 mg/dl  Fasting specimen      HDL Cholesterol 67 >49 mg/dL    LDL Cholesterol Calculated 98 <100 mg/dL      Comment:      Desirable:       <100 mg/dl    Non HDL Cholesterol 175 (H) <130 mg/dL      Comment:      Above Desirable:  130-159 mg/dl  Borderline high:  160-189 mg/dl  High:             190-219 mg/dl  Very high:       >219 mg/dl     If you have any questions or concerns, please call the clinic at the number listed above.   Sincerely,  Terri Hill, CNP

## 2019-11-01 NOTE — PATIENT INSTRUCTIONS
1. Atypical mole  Chronic, stable  - HC TANGENTIAL BIOPSY OF SKIN, FIRST LESION      Shave Biopsy   1. Keep Band-Aid on for 24 hours.  2. Use warm soapy water to clean. Pat dry.  3. You may use antibacterial ointment on the wound, and keep covered  4. Monitor for signs of infection- redness, discharge, pain at site. It this occurs, return to clinic for assessment  5. Pathology results will be sent to you.  6. If you need further removal, we will refer you to Dermatology.

## 2019-11-01 NOTE — LETTER
November 7, 2019      Fabi Rizzo  [address] [facility] [address]        Dear ,    Pathology result- no concern, just seborrheic keratosis- this is a benign disorder.   A seborrheic keratosis is one of the most common noncancerous skin growths in older adults. While it's possible for one to appear on its own, multiple growths are more common. Seborrheic keratosis often appears on the face, chest, shoulders, or back. It has a waxy, scaly, slightly elevated appearance. No treatment is necessary.     Surgical pathology exam   Result Value Ref Range    Copath Report       SPECIMEN(S):  Skin, under right breast    FINAL DIAGNOSIS:  Skin lesion from under right breast:  - Seborrheic keratosis.    GROSS:  The specimen is received in formalin, labeled with the patient's name and   date of birth. It consists of an unoriented, 1.2 x 0.9 cm lightly pigmented skin shave.  The  cutaneous surface contains a 1.0 x 0.6 cm purple tan slightly raised lesion.  The resection margin is inked black.  The specimen is serially sectioned and submitted entirely in one   cassette.     If you have any questions or concerns, please call the clinic at the number listed above.     Sincerely,    Terri Hill, CNP

## 2019-11-01 NOTE — NURSING NOTE
"Chief Complaint   Patient presents with     Lesion Removal       Initial /74 (BP Location: Right arm, Patient Position: Sitting, Cuff Size: Adult Regular)   Pulse 77   Temp 97.9  F (36.6  C) (Tympanic)   Resp 20   Ht 1.626 m (5' 4\")   Wt 63.5 kg (140 lb)   SpO2 98%   BMI 24.03 kg/m   Estimated body mass index is 24.03 kg/m  as calculated from the following:    Height as of this encounter: 1.626 m (5' 4\").    Weight as of this encounter: 63.5 kg (140 lb).    Patient presents to the clinic using No DME    Health Maintenance that is potentially due pending provider review:  Colonoscopy/FIT    Mailed in her FIT test 10/25/2019    Is there anyone who you would like to be able to receive your results? No  If yes have patient fill out WILLIAM    "

## 2019-11-03 LAB — HEMOCCULT STL QL IA: NEGATIVE

## 2019-11-03 NOTE — RESULT ENCOUNTER NOTE
Dear Fabi,  Vitamin B12 normal  TSH (thyroid) normal  CMP normal, except your liver function has worsened from 2 years ago  Lipids- your total cholesterol is elevated, your triglycerides are high, your HDL (good cholesterol) is fine, and LDL (bad cholesterol) is fine.  Your 10 year risk of heart attack and stroke is elevated at 11.4%. I'd like you to schedule an office visit to further discuss these labs. We could do a telephone visit, if you'd prefer.   The 10-year ASCVD risk score (Jeffhernandez RODRÍGUEZ Jr., et al., 2013) is: 11.4%    Values used to calculate the score:      Age: 64 years      Sex: Female      Is Non- : No      Diabetic: No      Tobacco smoker: Yes      Systolic Blood Pressure: 126 mmHg      Is BP treated: Yes      HDL Cholesterol: 67 mg/dL      Total Cholesterol: 242 mg/dL      Please contact our clinic via phone or My Chart if you have any questions or concerns.  Thanks,  AMARJIT Maria

## 2019-11-06 LAB — COPATH REPORT: NORMAL

## 2019-11-07 NOTE — RESULT ENCOUNTER NOTE
Dear Fabi,  Pathology result- no concern, just seborrheic keratosis- this is a benign disorder.  A seborrheic keratosis is one of the most common noncancerous skin growths in older adults. While it's possible for one to appear on its own, multiple growths are more common. Seborrheic keratosis often appears on the face, chest, shoulders, or back. It has a waxy, scaly, slightly elevated appearance. No treatment is necessary.    Please contact our clinic via phone or My Chart if you have any questions or concerns.  Thanks,  Terri Hill, AMARJIT

## 2019-11-08 ENCOUNTER — DOCUMENTATION ONLY (OUTPATIENT)
Dept: OTHER | Facility: CLINIC | Age: 64
End: 2019-11-08

## 2019-11-15 DIAGNOSIS — J30.89 CHRONIC NON-SEASONAL ALLERGIC RHINITIS: ICD-10-CM

## 2019-11-15 RX ORDER — ALBUTEROL SULFATE 90 UG/1
AEROSOL, METERED RESPIRATORY (INHALATION)
Qty: 18 G | Refills: 3 | Status: SHIPPED | OUTPATIENT
Start: 2019-11-15 | End: 2020-06-05

## 2019-11-15 NOTE — TELEPHONE ENCOUNTER
"Requested Prescriptions   Pending Prescriptions Disp Refills     albuterol (PROAIR HFA/PROVENTIL HFA/VENTOLIN HFA) 108 (90 Base) MCG/ACT inhaler [Pharmacy Med Name: ALBUTEROL SULFATE HFA 90 MCG HFA AER AD] 18 g 1     Sig: inhale 2 PUFFS EVERY 6 HOURS AS NEEDED SHORTNESS OF BREATH OR wheeze       Asthma Maintenance Inhalers - Anticholinergics Passed - 11/15/2019 10:34 AM        Passed - Patient is age 12 years or older        Passed - Asthma control assessment score within normal limits in last 6 months     Please review ACT score.           Passed - Medication is active on med list        Passed - Recent (6 mo) or future (30 days) visit within the authorizing provider's specialty     Patient had office visit in the last 6 months or has a visit in the next 30 days with authorizing provider or within the authorizing provider's specialty.  See \"Patient Info\" tab in inbasket, or \"Choose Columns\" in Meds & Orders section of the refill encounter.          albuterol (PROAIR HFA/PROVENTIL HFA/VENTOLIN HFA) 108 (90 Base) MCG/ACT inhaler  Last Written Prescription Date:  05/03/2019  Last Fill Quantity: 18g,  # refills: 1   Last office visit: 11/1/2019 with prescribing provider:  CLARISSE Hill   Future Office Visit:      ACT Total Scores 5/5/2017 2/16/2018 10/25/2019   ACT TOTAL SCORE - - -   ASTHMA ER VISITS - - -   ASTHMA HOSPITALIZATIONS - - -   ACT TOTAL SCORE (Goal Greater than or Equal to 20) 18 15 20   In the past 12 months, how many times did you visit the emergency room for your asthma without being admitted to the hospital? 0 0 0   In the past 12 months, how many times were you hospitalized overnight because of your asthma? 0 0 0       Sepideh Caldera RT (SYDNEE) (M)      "

## 2019-12-27 DIAGNOSIS — J45.20 MILD INTERMITTENT ASTHMA WITHOUT COMPLICATION: Chronic | ICD-10-CM

## 2019-12-27 RX ORDER — FLUTICASONE PROPIONATE AND SALMETEROL 113; 14 UG/1; UG/1
POWDER, METERED RESPIRATORY (INHALATION)
Qty: 1 INHALER | Refills: 3 | Status: SHIPPED | OUTPATIENT
Start: 2019-12-27 | End: 2020-08-26

## 2020-01-27 DIAGNOSIS — M1A.09X0 CHRONIC GOUT OF MULTIPLE SITES, UNSPECIFIED CAUSE: ICD-10-CM

## 2020-01-27 DIAGNOSIS — I10 BENIGN ESSENTIAL HYPERTENSION: ICD-10-CM

## 2020-01-27 RX ORDER — LISINOPRIL 2.5 MG/1
TABLET ORAL
Qty: 90 TABLET | Refills: 0 | Status: SHIPPED | OUTPATIENT
Start: 2020-01-27 | End: 2020-04-27

## 2020-01-27 RX ORDER — ALLOPURINOL 100 MG/1
100 TABLET ORAL DAILY
Qty: 30 TABLET | Refills: 0 | Status: SHIPPED | OUTPATIENT
Start: 2020-01-27 | End: 2020-02-26

## 2020-01-27 NOTE — TELEPHONE ENCOUNTER
"Requested Prescriptions   Pending Prescriptions Disp Refills     allopurinol (ZYLOPRIM) 100 MG tablet [Pharmacy Med Name: ALLOPURINOL 100 MG TABLET] 90 tablet 1     Sig: Take 1 tablet (100 mg) by mouth daily       Gout Agents Protocol Failed - 1/27/2020  7:28 AM        Failed - CBC on file in past 12 months     Recent Labs   Lab Test 06/15/16  1030   WBC 4.7   RBC 4.12   HGB 14.6   HCT 43.4                    Failed - Has Uric Acid on file in past 12 months and value is less than 6     Recent Labs   Lab Test 06/22/18  0845   URIC 5.9     If level is 6mg/dL or greater, ok to refill one time and refer to provider.           Passed - ALT on file in past 12 months     Recent Labs   Lab Test 11/01/19  0946   ALT 70*             Passed - Recent (12 mo) or future (30 days) visit within the authorizing provider's specialty     Patient has had an office visit with the authorizing provider or a provider within the authorizing providers department within the previous 12 mos or has a future within next 30 days. See \"Patient Info\" tab in inbasket, or \"Choose Columns\" in Meds & Orders section of the refill encounter.      Last Written Prescription Date:  8/1/19  Last Fill Quantity: 90,  # refills: 1   Last office visit: 11/1/2019 with prescribing provider:     Future Office Visit:                Passed - Medication is active on med list        Passed - Patient is age 18 or older        Passed - No active pregnancy on record        Passed - Normal serum creatinine on file in the past 12 months     Recent Labs   Lab Test 11/01/19  0946   CR 1.00             Passed - No positive pregnancy test in past year        lisinopril (PRINIVIL/ZESTRIL) 2.5 MG tablet [Pharmacy Med Name: LISINOPRIL 2.5 MG TABLET] 90 tablet 1     Sig: Take 1 tablet (2.5 mg) by mouth daily       ACE Inhibitors (Including Combos) Protocol Passed - 1/27/2020  7:28 AM        Passed - Blood pressure under 140/90 in past 12 months     BP Readings from Last 3 " "Encounters:   11/01/19 126/74   10/25/19 (!) 146/84   11/21/18 132/88                 Passed - Recent (12 mo) or future (30 days) visit within the authorizing provider's specialty     Patient has had an office visit with the authorizing provider or a provider within the authorizing providers department within the previous 12 mos or has a future within next 30 days. See \"Patient Info\" tab in inbasket, or \"Choose Columns\" in Meds & Orders section of the refill encounter.              Passed - Medication is active on med list        Passed - Patient is age 18 or older        Passed - No active pregnancy on record        Passed - Normal serum creatinine on file in past 12 months     Recent Labs   Lab Test 11/01/19  0946   CR 1.00             Passed - Normal serum potassium on file in past 12 months     Recent Labs   Lab Test 11/01/19  0946   POTASSIUM 4.2             Passed - No positive pregnancy test within past 12 months        Last Written Prescription Date:  8/1/19  Last Fill Quantity: 90,  # refills: 1   Last office visit: 11/1/2019 with prescribing provider:     Future Office Visit:      "

## 2020-02-26 DIAGNOSIS — M1A.09X0 CHRONIC GOUT OF MULTIPLE SITES, UNSPECIFIED CAUSE: ICD-10-CM

## 2020-02-26 RX ORDER — ALLOPURINOL 100 MG/1
100 TABLET ORAL DAILY
Qty: 30 TABLET | Refills: 0 | Status: SHIPPED | OUTPATIENT
Start: 2020-02-26 | End: 2020-03-27

## 2020-02-26 NOTE — TELEPHONE ENCOUNTER
"Requested Prescriptions   Pending Prescriptions Disp Refills     allopurinol (ZYLOPRIM) 100 MG tablet [Pharmacy Med Name: allopurinol 100 mg tablet] 30 tablet 0     Sig: Take 1 tablet (100 mg) by mouth daily DUE FOR LAB ONLY URIC ACID       Gout Agents Protocol Failed - 2/26/2020  3:39 PM        Failed - CBC on file in past 12 months     Recent Labs   Lab Test 06/15/16  1030   WBC 4.7   RBC 4.12   HGB 14.6   HCT 43.4                    Failed - Has Uric Acid on file in past 12 months and value is less than 6     Recent Labs   Lab Test 06/22/18  0845   URIC 5.9     If level is 6mg/dL or greater, ok to refill one time and refer to provider.           Passed - ALT on file in past 12 months     Recent Labs   Lab Test 11/01/19  0946   ALT 70*             Passed - Recent (12 mo) or future (30 days) visit within the authorizing provider's specialty     Patient has had an office visit with the authorizing provider or a provider within the authorizing providers department within the previous 12 mos or has a future within next 30 days. See \"Patient Info\" tab in inbasket, or \"Choose Columns\" in Meds & Orders section of the refill encounter.              Passed - Medication is active on med list        Passed - Patient is age 18 or older        Passed - No active pregnancy on record        Passed - Normal serum creatinine on file in the past 12 months     Recent Labs   Lab Test 11/01/19  0946   CR 1.00             Passed - No positive pregnancy test in past year        Last Written Prescription Date:  1/27/20  Last Fill Quantity: 30,  # refills: 0   Last office visit: 11/1/2019 with prescribing provider:     Future Office Visit:      "

## 2020-03-01 ENCOUNTER — HEALTH MAINTENANCE LETTER (OUTPATIENT)
Age: 65
End: 2020-03-01

## 2020-03-26 DIAGNOSIS — M1A.09X0 CHRONIC GOUT OF MULTIPLE SITES, UNSPECIFIED CAUSE: ICD-10-CM

## 2020-03-26 NOTE — TELEPHONE ENCOUNTER
"Requested Prescriptions   Pending Prescriptions Disp Refills     allopurinol (ZYLOPRIM) 100 MG tablet [Pharmacy Med Name: allopurinol 100 mg tablet] 30 tablet 0     Sig: Take 1 tablet (100 mg) by mouth daily DUE FOR LAB ONLY URIC ACID       Gout Agents Protocol Failed - 3/26/2020 11:33 AM        Failed - CBC on file in past 12 months     Recent Labs   Lab Test 06/15/16  1030   WBC 4.7   RBC 4.12   HGB 14.6   HCT 43.4                    Failed - Has Uric Acid on file in past 12 months and value is less than 6     Recent Labs   Lab Test 06/22/18  0845   URIC 5.9     If level is 6mg/dL or greater, ok to refill one time and refer to provider.           Passed - ALT on file in past 12 months     Recent Labs   Lab Test 11/01/19  0946   ALT 70*             Passed - Recent (12 mo) or future (30 days) visit within the authorizing provider's specialty     Patient has had an office visit with the authorizing provider or a provider within the authorizing providers department within the previous 12 mos or has a future within next 30 days. See \"Patient Info\" tab in inbasket, or \"Choose Columns\" in Meds & Orders section of the refill encounter.              Passed - Medication is active on med list        Passed - Patient is age 18 or older        Passed - No active pregnancy on record        Passed - Normal serum creatinine on file in the past 12 months     Recent Labs   Lab Test 11/01/19  0946   CR 1.00       Ok to refill medication if creatinine is low          Passed - No positive pregnancy test in past year           Last Written Prescription Date:  2/26/20  Last Fill Quantity: 30,  # refills: 0   Last office visit: 11/1/2019 with prescribing provider:      Future Office Visit:      "

## 2020-03-26 NOTE — TELEPHONE ENCOUNTER
Forwarded to provider due to labs not current.  Honey- do you want labs now or defer?  FRANCHESCA Hicks RN

## 2020-03-27 RX ORDER — ALLOPURINOL 100 MG/1
100 TABLET ORAL DAILY
Qty: 30 TABLET | Refills: 0 | Status: SHIPPED | OUTPATIENT
Start: 2020-03-27 | End: 2020-04-29

## 2020-04-25 DIAGNOSIS — I10 BENIGN ESSENTIAL HYPERTENSION: ICD-10-CM

## 2020-04-25 DIAGNOSIS — M1A.09X0 CHRONIC GOUT OF MULTIPLE SITES, UNSPECIFIED CAUSE: ICD-10-CM

## 2020-04-25 NOTE — LETTER
[facility]  [address] [facility] [address]  Phone: [phone]  Fax: [phone]       April 29, 2020         Fabi Rizzo  [address] [place] [id number]            Dear Fabi:    We are concerned about your health care.  We recently provided you with medication refills.  You are due to have your Uric Acid blood test. You will need to have this done before we can refill the next time.     Your prescription(s) have been refilled for Allopurinol so you may have time for the above noted follow-up. Please call to schedule a lab visit soon so we can assure you have an appointment before your next refills are needed.    Thank you,      Terri Sergio, CNP/ sidney  For Honey Watson

## 2020-04-27 RX ORDER — LISINOPRIL 2.5 MG/1
TABLET ORAL
Qty: 90 TABLET | Refills: 0 | Status: SHIPPED | OUTPATIENT
Start: 2020-04-27 | End: 2020-07-28

## 2020-04-29 RX ORDER — ALLOPURINOL 100 MG/1
100 TABLET ORAL DAILY
Qty: 90 TABLET | Refills: 0 | Status: SHIPPED | OUTPATIENT
Start: 2020-04-29 | End: 2020-07-28

## 2020-04-29 NOTE — TELEPHONE ENCOUNTER
Refilled Allopurinol for 3 months in Honey's absence.   She is overdue for Uric acid level. She will need to schedule lab to refill further.  AMARJIT Maria      Uric Acid   Date Value Ref Range Status   06/22/2018 5.9 2.6 - 6.0 mg/dL Final

## 2020-06-05 DIAGNOSIS — J30.89 CHRONIC NON-SEASONAL ALLERGIC RHINITIS: ICD-10-CM

## 2020-06-05 RX ORDER — ALBUTEROL SULFATE 90 UG/1
AEROSOL, METERED RESPIRATORY (INHALATION)
Qty: 18 G | Refills: 0 | Status: SHIPPED | OUTPATIENT
Start: 2020-06-05 | End: 2020-12-10

## 2020-07-20 ENCOUNTER — TELEPHONE (OUTPATIENT)
Dept: FAMILY MEDICINE | Facility: CLINIC | Age: 65
End: 2020-07-20

## 2020-07-20 NOTE — TELEPHONE ENCOUNTER
ACT Total Scores 2/16/2018 10/25/2019 7/20/2020   ACT TOTAL SCORE - - -   ASTHMA ER VISITS - - -   ASTHMA HOSPITALIZATIONS - - -   ACT TOTAL SCORE (Goal Greater than or Equal to 20) 15 20 19   In the past 12 months, how many times did you visit the emergency room for your asthma without being admitted to the hospital? 0 0 0   In the past 12 months, how many times were you hospitalized overnight because of your asthma? 0 0 0     FRANCHESCA Hicks RN

## 2020-07-21 ASSESSMENT — ASTHMA QUESTIONNAIRES: ACT_TOTALSCORE: 19

## 2020-07-28 DIAGNOSIS — I10 BENIGN ESSENTIAL HYPERTENSION: ICD-10-CM

## 2020-07-28 DIAGNOSIS — M1A.09X0 CHRONIC GOUT OF MULTIPLE SITES, UNSPECIFIED CAUSE: ICD-10-CM

## 2020-07-28 RX ORDER — LISINOPRIL 2.5 MG/1
TABLET ORAL
Qty: 90 TABLET | Refills: 0 | Status: SHIPPED | OUTPATIENT
Start: 2020-07-28 | End: 2020-10-30

## 2020-07-28 RX ORDER — ALLOPURINOL 100 MG/1
100 TABLET ORAL DAILY
Qty: 90 TABLET | Refills: 0 | Status: SHIPPED | OUTPATIENT
Start: 2020-07-28 | End: 2020-10-30

## 2020-07-28 NOTE — TELEPHONE ENCOUNTER
"Please address this request at appointment on 7/31/20. Thank you!    Requested Prescriptions   Pending Prescriptions Disp Refills     allopurinol (ZYLOPRIM) 100 MG tablet [Pharmacy Med Name: allopurinol 100 mg tablet] 90 tablet 0     Sig: Take 1 tablet (100 mg) by mouth daily Due for lab only uric acid for further refill.       Gout Agents Protocol Failed - 7/28/2020 11:35 AM        Failed - CBC on file in past 12 months     Recent Labs   Lab Test 06/15/16  1030   WBC 4.7   RBC 4.12   HGB 14.6   HCT 43.4                    Failed - Has Uric Acid on file in past 12 months and value is less than 6     Recent Labs   Lab Test 06/22/18  0845   URIC 5.9     If level is 6mg/dL or greater, ok to refill one time and refer to provider.           Passed - ALT on file in past 12 months     Recent Labs   Lab Test 11/01/19  0946   ALT 70*             Passed - Recent (12 mo) or future (30 days) visit within the authorizing provider's specialty     Patient has had an office visit with the authorizing provider or a provider within the authorizing providers department within the previous 12 mos or has a future within next 30 days. See \"Patient Info\" tab in inbasket, or \"Choose Columns\" in Meds & Orders section of the refill encounter.              Passed - Medication is active on med list        Passed - Patient is age 18 or older        Passed - No active pregnancy on record        Passed - Normal serum creatinine on file in the past 12 months     Recent Labs   Lab Test 11/01/19  0946   CR 1.00       Ok to refill medication if creatinine is low          Passed - No positive pregnancy test in past year           '  "

## 2020-07-28 NOTE — TELEPHONE ENCOUNTER
"Please address this request at visit on 7/31/20. Thank you!    Requested Prescriptions   Pending Prescriptions Disp Refills     lisinopril (ZESTRIL) 2.5 MG tablet [Pharmacy Med Name: lisinopril 2.5 mg tablet] 90 tablet 0     Sig: Take 1 tablet (2.5 mg) by mouth daily       ACE Inhibitors (Including Combos) Protocol Passed - 7/28/2020 11:35 AM        Passed - Blood pressure under 140/90 in past 12 months     BP Readings from Last 3 Encounters:   11/01/19 126/74   10/25/19 (!) 146/84   11/21/18 132/88                 Passed - Recent (12 mo) or future (30 days) visit within the authorizing provider's specialty     Patient has had an office visit with the authorizing provider or a provider within the authorizing providers department within the previous 12 mos or has a future within next 30 days. See \"Patient Info\" tab in inbasket, or \"Choose Columns\" in Meds & Orders section of the refill encounter.              Passed - Medication is active on med list        Passed - Patient is age 18 or older        Passed - No active pregnancy on record        Passed - Normal serum creatinine on file in past 12 months     Recent Labs   Lab Test 11/01/19  0946   CR 1.00       Ok to refill medication if creatinine is low          Passed - Normal serum potassium on file in past 12 months     Recent Labs   Lab Test 11/01/19  0946   POTASSIUM 4.2             Passed - No positive pregnancy test within past 12 months             "

## 2020-07-31 ENCOUNTER — OFFICE VISIT (OUTPATIENT)
Dept: FAMILY MEDICINE | Facility: CLINIC | Age: 65
End: 2020-07-31
Payer: COMMERCIAL

## 2020-07-31 VITALS
TEMPERATURE: 97.6 F | HEART RATE: 76 BPM | DIASTOLIC BLOOD PRESSURE: 80 MMHG | WEIGHT: 135 LBS | RESPIRATION RATE: 12 BRPM | BODY MASS INDEX: 23.17 KG/M2 | SYSTOLIC BLOOD PRESSURE: 130 MMHG

## 2020-07-31 DIAGNOSIS — M1A.09X0 CHRONIC GOUT OF MULTIPLE SITES, UNSPECIFIED CAUSE: Chronic | ICD-10-CM

## 2020-07-31 DIAGNOSIS — Z12.31 ENCOUNTER FOR SCREENING MAMMOGRAM FOR BREAST CANCER: ICD-10-CM

## 2020-07-31 DIAGNOSIS — Z11.4 ENCOUNTER FOR SCREENING FOR HIV: ICD-10-CM

## 2020-07-31 DIAGNOSIS — I10 BENIGN ESSENTIAL HYPERTENSION: ICD-10-CM

## 2020-07-31 DIAGNOSIS — Z11.59 ENCOUNTER FOR HEPATITIS C SCREENING TEST FOR LOW RISK PATIENT: Primary | ICD-10-CM

## 2020-07-31 DIAGNOSIS — F33.0 MAJOR DEPRESSIVE DISORDER, RECURRENT EPISODE, MILD (H): ICD-10-CM

## 2020-07-31 LAB
ALBUMIN SERPL-MCNC: 3.9 G/DL (ref 3.4–5)
ALP SERPL-CCNC: 152 U/L (ref 40–150)
ALT SERPL W P-5'-P-CCNC: 84 U/L (ref 0–50)
ANION GAP SERPL CALCULATED.3IONS-SCNC: 5 MMOL/L (ref 3–14)
AST SERPL W P-5'-P-CCNC: 153 U/L (ref 0–45)
BILIRUB SERPL-MCNC: 0.4 MG/DL (ref 0.2–1.3)
BUN SERPL-MCNC: 20 MG/DL (ref 7–30)
CALCIUM SERPL-MCNC: 10.4 MG/DL (ref 8.5–10.1)
CHLORIDE SERPL-SCNC: 105 MMOL/L (ref 94–109)
CO2 SERPL-SCNC: 27 MMOL/L (ref 20–32)
CREAT SERPL-MCNC: 1.56 MG/DL (ref 0.52–1.04)
GFR SERPL CREATININE-BSD FRML MDRD: 35 ML/MIN/{1.73_M2}
GLUCOSE SERPL-MCNC: 101 MG/DL (ref 70–99)
POTASSIUM SERPL-SCNC: 4.5 MMOL/L (ref 3.4–5.3)
PROT SERPL-MCNC: 8 G/DL (ref 6.8–8.8)
SODIUM SERPL-SCNC: 137 MMOL/L (ref 133–144)
URATE SERPL-MCNC: 4.9 MG/DL (ref 2.6–6)

## 2020-07-31 PROCEDURE — 84550 ASSAY OF BLOOD/URIC ACID: CPT | Performed by: NURSE PRACTITIONER

## 2020-07-31 PROCEDURE — 86803 HEPATITIS C AB TEST: CPT | Performed by: NURSE PRACTITIONER

## 2020-07-31 PROCEDURE — 87522 HEPATITIS C REVRS TRNSCRPJ: CPT | Performed by: NURSE PRACTITIONER

## 2020-07-31 PROCEDURE — 87389 HIV-1 AG W/HIV-1&-2 AB AG IA: CPT | Performed by: NURSE PRACTITIONER

## 2020-07-31 PROCEDURE — 36415 COLL VENOUS BLD VENIPUNCTURE: CPT | Performed by: NURSE PRACTITIONER

## 2020-07-31 PROCEDURE — 80053 COMPREHEN METABOLIC PANEL: CPT | Performed by: NURSE PRACTITIONER

## 2020-07-31 PROCEDURE — 99214 OFFICE O/P EST MOD 30 MIN: CPT | Performed by: NURSE PRACTITIONER

## 2020-07-31 RX ORDER — BUPROPION HYDROCHLORIDE 150 MG/1
150 TABLET, EXTENDED RELEASE ORAL 2 TIMES DAILY
Qty: 180 TABLET | Refills: 5 | Status: SHIPPED | OUTPATIENT
Start: 2020-07-31

## 2020-07-31 ASSESSMENT — ANXIETY QUESTIONNAIRES
1. FEELING NERVOUS, ANXIOUS, OR ON EDGE: NOT AT ALL
5. BEING SO RESTLESS THAT IT IS HARD TO SIT STILL: NOT AT ALL
6. BECOMING EASILY ANNOYED OR IRRITABLE: NOT AT ALL
2. NOT BEING ABLE TO STOP OR CONTROL WORRYING: NOT AT ALL
3. WORRYING TOO MUCH ABOUT DIFFERENT THINGS: SEVERAL DAYS
GAD7 TOTAL SCORE: 2
7. FEELING AFRAID AS IF SOMETHING AWFUL MIGHT HAPPEN: SEVERAL DAYS

## 2020-07-31 ASSESSMENT — PATIENT HEALTH QUESTIONNAIRE - PHQ9
5. POOR APPETITE OR OVEREATING: NOT AT ALL
SUM OF ALL RESPONSES TO PHQ QUESTIONS 1-9: 0

## 2020-07-31 ASSESSMENT — PAIN SCALES - GENERAL: PAINLEVEL: NO PAIN (0)

## 2020-07-31 NOTE — NURSING NOTE
"Chief Complaint   Patient presents with     Hypertension     Depression     /80   Pulse 76   Temp 97.6  F (36.4  C) (Tympanic)   Resp 12   Wt 61.2 kg (135 lb)   BMI 23.17 kg/m   Estimated body mass index is 23.17 kg/m  as calculated from the following:    Height as of 11/1/19: 1.626 m (5' 4\").    Weight as of this encounter: 61.2 kg (135 lb).  Patient presents to the clinic using No DME      Health Maintenance that is potentially due pending provider review:    Health Maintenance Due   Topic Date Due     HEPATITIS C SCREENING  1955     HIV SCREENING  02/15/1971     ZOSTER IMMUNIZATION (1 of 2) 02/15/2006     PREVENTIVE CARE VISIT  06/15/2017     MAMMO SCREENING  02/26/2020     PHQ-9  04/25/2020        Pended\.        "

## 2020-07-31 NOTE — PROGRESS NOTES
SUBJECTIVE   Fabi Rizzo is a 65 year old female who presents with     Hypertension Follow-up      Do you check your blood pressure regularly outside of the clinic? No     Are you following a low salt diet? No    Are your blood pressures ever more than 140 on the top number (systolic) OR more   than 90 on the bottom number (diastolic), for example 140/90?   BP Readings from Last 6 Encounters:   07/31/20 130/80   11/01/19 126/74   10/25/19 (!) 146/84   11/21/18 132/88   06/29/18 132/84   02/26/18 130/80     Depression Followup    How are you doing with your depression since your last visit? No change    Are you having other symptoms that might be associated with depression? No    Have you had a significant life event?  No     Are you feeling anxious or having panic attacks?   No    Do you have any concerns with your use of alcohol or other drugs? No    Social History     Tobacco Use     Smoking status: Current Every Day Smoker     Packs/day: 0.25     Years: 30.00     Pack years: 7.50     Types: Cigarettes     Smokeless tobacco: Never Used   Substance Use Topics     Alcohol use: Yes     Drug use: No     PHQ 5/5/2017 2/16/2018 10/25/2019   PHQ-9 Total Score 3 2 0   Q9: Thoughts of better off dead/self-harm past 2 weeks Not at all Not at all Not at all     LACY-7 SCORE 5/5/2017 2/16/2018 10/25/2019   Total Score - - -   Total Score - - 0 (minimal anxiety)   Total Score 4 2 0       Suicide Assessment Five-step Evaluation and Treatment (SAFE-T)      Musculoskeletal problem/pain      Duration: 1 week    Description  Location: right knee    Intensity:  moderate    Accompanying signs and symptoms: swelling in knee and lower leg    History  Previous similar problem: no   Previous evaluation:  none    Precipitating or alleviating factors:  Trauma or overuse: YES- fell off a retaining wall while on vacation  Aggravating factors include: none    Therapies tried and outcome: ice and Ibuprofen      PCP   Terri Hill,  [facility] [phone]    Health Maintenance        Health Maintenance Due   Topic Date Due     HEPATITIS C SCREENING  1955     HIV SCREENING  02/15/1971     ZOSTER IMMUNIZATION (1 of 2) 02/15/2006     PREVENTIVE CARE VISIT  06/15/2017     MAMMO SCREENING  02/26/2020     PHQ-9  04/25/2020       HPI        Patient Active Problem List   Diagnosis     CARDIOVASCULAR SCREENING; LDL GOAL LESS THAN 130     Benign essential hypertension     Intermittent asthma     GERD (gastroesophageal reflux disease)     Major depressive disorder, recurrent episode, mild (HCC)     Alcohol use     Chronic low back pain     Spondylolisthesis of lumbar region     Lumbar foraminal stenosis     Chronic rhinitis     Chronic gout of multiple sites, unspecified cause     Tobacco use disorder     Varicose veins of both lower extremities     Healthcare maintenance     Encounter for smoking cessation counseling     Seborrheic keratosis     Atypical mole     Current Outpatient Medications   Medication     AIRDUO RESPICLICK 113/14 113-14 MCG/ACT inhaler     albuterol (PROAIR HFA/PROVENTIL HFA/VENTOLIN HFA) 108 (90 Base) MCG/ACT inhaler     allopurinol (ZYLOPRIM) 100 MG tablet     aspirin (ASA) 81 MG tablet     buPROPion (WELLBUTRIN SR) 150 MG 12 hr tablet     CALCIUM 600 MG PO TABS     lisinopril (ZESTRIL) 2.5 MG tablet     metoprolol tartrate (LOPRESSOR) 50 MG tablet     montelukast (SINGULAIR) 10 MG tablet     MULTIVITAMINS PO TABS     ORDER FOR DME     VITAMIN D3 1000 UNIT PO CAPS     colchicine (COLCRYS) 0.6 MG tablet     nicotine (NICODERM CQ) 14 MG/24HR 24 hr patch     order for DME     No current facility-administered medications for this visit.          Reviewed and updated:  Tobacco  Allergies  Meds  Med Hx  Surg Hx  Fam Hx  Soc Hx     ROS:  Constitutional, HEENT, cardiovascular, pulmonary, gi and gu systems are negative, except as otherwise noted.    PHYSICAL EXAM   /80   Pulse 76   Temp 97.6  F (36.4  C) (Tympanic)   Resp 12    Wt 61.2 kg (135 lb)   BMI 23.17 kg/m    Body mass index is 23.17 kg/m .  GENERAL: healthy, alert and no distress  NECK: no adenopathy, no asymmetry, masses, or scars and thyroid normal to palpation  RESP: lungs clear to auscultation - no rales, rhonchi or wheezes  CV: regular rate and rhythm, normal S1 S2, no S3 or S4, no murmur, click or rub, no peripheral edema and peripheral pulses strong  ABDOMEN: soft, nontender, no hepatosplenomegaly, no masses and bowel sounds normal  MS: no gross musculoskeletal defects noted, no edema    Assessment & Plan     1. Major depressive disorder, recurrent episode, mild (HCC)  Stable   - buPROPion (WELLBUTRIN SR) 150 MG 12 hr tablet; Take 1 tablet (150 mg) by mouth 2 times daily  Dispense: 180 tablet; Refill: 5    2. Encounter for hepatitis C screening test for low risk patient  - Hepatitis C Screen Reflex to HCV RNA Quant and Genotype  - Hepatitis C RNA Quantitative    3. Encounter for screening for HIV  - HIV Screening    4. Chronic gout of multiple sites, unspecified cause  - Uric acid    5. Benign essential hypertension  Stable   - Comprehensive metabolic panel (BMP + Alb, Alk Phos, ALT, AST, Total. Bili, TP)    6. Encounter for screening mammogram for breast cancer  - MA SCREENING DIGITAL BILAT - Future  (s+30); Future     Tobacco Cessation:   reports that she has been smoking cigarettes. She has a 7.50 pack-year smoking history. She has never used smokeless tobacco.          Patient Instructions   Labs today   Due for a mammogram- you will be contacted to schedule     Suspect swelling in right leg is more musculoskeletal   elevate leg   Follow up if not improved 2-3 weeks         Return in about 2 weeks (around 8/14/2020), or if symptoms worsen or fail to improve.    Adrianna Watson NP  [facility]      Risks, benefits, side effects and rationale for treatment plan fully discussed with the patient and understanding expressed.

## 2020-07-31 NOTE — PATIENT INSTRUCTIONS
Labs today   Due for a mammogram- you will be contacted to schedule     Suspect swelling in right leg is more musculoskeletal   elevate leg   Follow up if not improved 2-3 weeks

## 2020-08-01 ASSESSMENT — ANXIETY QUESTIONNAIRES: GAD7 TOTAL SCORE: 2

## 2020-08-03 LAB
HCV AB SERPL QL IA: REACTIVE
HIV 1+2 AB+HIV1 P24 AG SERPL QL IA: NONREACTIVE

## 2020-08-04 LAB
HCV RNA SERPL NAA+PROBE-ACNC: NORMAL [IU]/ML
HCV RNA SERPL NAA+PROBE-LOG IU: NORMAL LOG IU/ML

## 2020-08-06 DIAGNOSIS — R79.89 ELEVATED LFTS: ICD-10-CM

## 2020-08-06 DIAGNOSIS — N18.30 CKD (CHRONIC KIDNEY DISEASE) STAGE 3, GFR 30-59 ML/MIN (H): Primary | ICD-10-CM

## 2020-08-10 ENCOUNTER — OFFICE VISIT (OUTPATIENT)
Dept: FAMILY MEDICINE | Facility: CLINIC | Age: 65
End: 2020-08-10
Payer: COMMERCIAL

## 2020-08-10 VITALS
HEART RATE: 84 BPM | WEIGHT: 135.2 LBS | BODY MASS INDEX: 23.08 KG/M2 | RESPIRATION RATE: 16 BRPM | DIASTOLIC BLOOD PRESSURE: 70 MMHG | TEMPERATURE: 98.8 F | HEIGHT: 64 IN | SYSTOLIC BLOOD PRESSURE: 135 MMHG | OXYGEN SATURATION: 99 %

## 2020-08-10 DIAGNOSIS — R82.90 NONSPECIFIC FINDING ON EXAMINATION OF URINE: ICD-10-CM

## 2020-08-10 DIAGNOSIS — N30.00 ACUTE CYSTITIS WITHOUT HEMATURIA: Primary | ICD-10-CM

## 2020-08-10 DIAGNOSIS — N18.30 CKD (CHRONIC KIDNEY DISEASE) STAGE 3, GFR 30-59 ML/MIN (H): ICD-10-CM

## 2020-08-10 LAB
ALBUMIN UR-MCNC: 30 MG/DL
APPEARANCE UR: CLEAR
BACTERIA #/AREA URNS HPF: ABNORMAL /HPF
BILIRUB UR QL STRIP: NEGATIVE
COLOR UR AUTO: YELLOW
GLUCOSE UR STRIP-MCNC: NEGATIVE MG/DL
HGB UR QL STRIP: ABNORMAL
KETONES UR STRIP-MCNC: ABNORMAL MG/DL
LEUKOCYTE ESTERASE UR QL STRIP: ABNORMAL
NITRATE UR QL: NEGATIVE
NON-SQ EPI CELLS #/AREA URNS LPF: ABNORMAL /LPF
PH UR STRIP: 5.5 PH (ref 5–7)
RBC #/AREA URNS AUTO: ABNORMAL /HPF
SOURCE: ABNORMAL
SP GR UR STRIP: 1.02 (ref 1–1.03)
UROBILINOGEN UR STRIP-ACNC: 0.2 EU/DL (ref 0.2–1)
WBC #/AREA URNS AUTO: >100 /HPF

## 2020-08-10 PROCEDURE — 81001 URINALYSIS AUTO W/SCOPE: CPT | Performed by: NURSE PRACTITIONER

## 2020-08-10 PROCEDURE — 87088 URINE BACTERIA CULTURE: CPT | Performed by: NURSE PRACTITIONER

## 2020-08-10 PROCEDURE — 99213 OFFICE O/P EST LOW 20 MIN: CPT | Performed by: NURSE PRACTITIONER

## 2020-08-10 PROCEDURE — 87186 SC STD MICRODIL/AGAR DIL: CPT | Performed by: NURSE PRACTITIONER

## 2020-08-10 PROCEDURE — 87086 URINE CULTURE/COLONY COUNT: CPT | Performed by: NURSE PRACTITIONER

## 2020-08-10 RX ORDER — SULFAMETHOXAZOLE/TRIMETHOPRIM 800-160 MG
1 TABLET ORAL 2 TIMES DAILY
Qty: 10 TABLET | Refills: 0 | Status: SHIPPED | OUTPATIENT
Start: 2020-08-10 | End: 2020-08-15

## 2020-08-10 ASSESSMENT — MIFFLIN-ST. JEOR: SCORE: 1148.26

## 2020-08-10 NOTE — PROGRESS NOTES
Subjective     Fabi Rizzo is a 65 year old female who presents to clinic today for the following health issues:    HPI       URINARY TRACT SYMPTOMS      Duration: x 4-5 days    Description  frequency, urgency and nocturia x 3-4 times     Intensity:  mild, 1/10    Accompanying signs and symptoms:  Fever/chills: no   Flank pain no   Nausea and vomiting: no   Vaginal symptoms: none  Abdominal/Pelvic Pain: no     History  History of frequent UTI's: no   History of kidney stones: no   Sexually Active: YES- some  Possibility of pregnancy: No    Precipitating or alleviating factors: None    Therapies tried and outcome: none   Outcome:       BP Readings from Last 6 Encounters:   08/10/20 (!) 144/72   07/31/20 130/80   11/01/19 126/74   10/25/19 (!) 146/84   11/21/18 132/88   06/29/18 132/84       Patient Active Problem List   Diagnosis     CARDIOVASCULAR SCREENING; LDL GOAL LESS THAN 130     Benign essential hypertension     Intermittent asthma     GERD (gastroesophageal reflux disease)     Major depressive disorder, recurrent episode, mild (HCC)     Alcohol use     Chronic low back pain     Spondylolisthesis of lumbar region     Lumbar foraminal stenosis     Chronic rhinitis     Chronic gout of multiple sites, unspecified cause     Tobacco use disorder     Varicose veins of both lower extremities     Healthcare maintenance     Encounter for smoking cessation counseling     Seborrheic keratosis     Atypical mole     CKD (chronic kidney disease) stage 3, GFR 30-59 ml/min (H)     Past Surgical History:   Procedure Laterality Date     LAMINECTOMY LUMBAR POSTERIOR MICROSCOPIC ONE LEVEL N/A 11/17/2015    Procedure: LAMINECTOMY LUMBAR POSTERIOR MICROSCOPIC ONE LEVEL;  Surgeon: Stephen Vuong MD;  Location: [facility]     SURGICAL HISTORY OF -   09/30/2009    OPEN REDUCTION, INTERNAL FIXATINO OF RIGHT ANKLE (MEDIAL AND LATERL MALLEOLI)       Social History     Tobacco Use     Smoking status: Current Every Day Smoker      "Packs/day: 0.25     Years: 30.00     Pack years: 7.50     Types: Cigarettes     Smokeless tobacco: Never Used   Substance Use Topics     Alcohol use: Yes     Family History   Problem Relation Age of Onset     C.A.D. Mother      C.A.D. Father      Other Cancer Brother         leukemia from agent orange      Diabetes Brother      Hypertension Brother            Reviewed and updated as needed this visit by Provider         Review of Systems   Constitutional, HEENT, cardiovascular, pulmonary, gi and gu systems are negative, except as otherwise noted.      Objective    BP (!) 144/72   Pulse 84   Temp 98.8  F (37.1  C) (Tympanic)   Resp 16   Ht 1.626 m (5' 4\")   Wt 61.3 kg (135 lb 3.2 oz)   SpO2 99%   BMI 23.21 kg/m    Body mass index is 23.21 kg/m .  Physical Exam   GENERAL: healthy, alert and no distress  NECK: no adenopathy, no asymmetry, masses, or scars and thyroid normal to palpation  RESP: lungs clear to auscultation - no rales, rhonchi or wheezes  CV: regular rate and rhythm, normal S1 S2, no S3 or S4, no murmur, click or rub, no peripheral edema and peripheral pulses strong  ABDOMEN: soft, nontender, no hepatosplenomegaly, no masses and bowel sounds normal  MS: no gross musculoskeletal defects noted, no edema    Diagnostic Test Results:  Labs reviewed in Epic        Assessment & Plan       ICD-10-CM    1. Acute cystitis without hematuria  N30.00 *UA reflex to Microscopic and Culture ([facility] (except [place] and [place])     Urine Microscopic     sulfamethoxazole-trimethoprim (BACTRIM DS) 800-160 MG tablet   2. Nonspecific finding on examination of urine  R82.90 Urine Culture Aerobic Bacterial   3. CKD (chronic kidney disease) stage 3, GFR 30-59 ml/min (H)  N18.3             Patient Instructions   Urine showed infection   Antibiotic bactrim twice a day for 5 days   OVER THE COUNTER AZO or pyridium for bladder spasm/pain - turns urine orange  Push fluids   Follow up if not improved " after antibiotic  Will culture out urine- make sure antibiotic is the right one      Repeat labs in 1-2 weeks       Urinary Tract Infections in Women  Urinary tract infections (UTIs) are most often caused by bacteria (germs). These bacteria enter the urinary tract. The bacteria may come from outside the body. Or they may travel from the skin outside the rectum or vagina into the urethra. Female anatomy makes it easier for bacteria from the bowel to enter a woman s urinary tract, which is the most common source of UTI. This means women develop UTIs more often than men. Pain in or around the urinary tract is a common UTI symptom. But the only way to know for sure if you have a UTI for the health care provider to test your urine. The two tests that may be done are the urinalysis and urine culture.  Types of UTIs    Cystitis: A bladder infection (cystitis) is the most common UTI in women. You may have urgent or frequent urination. You may also have pain, burning when you urinate, and bloody urine.    Urethritis: This is an inflamed urethra, which is the tube that carries urine from the bladder to outside the body. You may have lower stomach or back pain. You may also have urgent or frequent urination.    Pyelonephritis: This is a kidney infection. If not treated, it can be serious and damage your kidneys. In severe cases, you may be hospitalized. You may have a fever and lower back pain.  Medications to treat a UTI  Most UTIs are treated with antibiotics. These kill the bacteria. The length of time you need to take them depends on the type of infection. It may be as short as 3 days. If you have repeated UTIs, a low-dose antibiotic may be needed for several months. Take antibiotics exactly as directed. Don t stop taking them until all of the medication is gone. If you stop taking the antibiotic too soon, the infection may not go away, and you may develop a resistance to the antibiotic. This can make it much harder to  treat.  Lifestyle changes to treat and prevent UTIs  The lifestyle changes below will help get rid of your UTI. They may also help prevent future UTIs.    Drink plenty of fluids. This includes water, juice, or other caffeine-free drinks. Fluids help flush bacteria out of your body.    Empty your bladder. Always empty your bladder when you feel the urge to urinate. And always urinate before going to sleep. Urine that stays in your bladder can lead to infection. Try to urinate before and after sex as well.    Practice good personal hygiene. Wipe yourself from front to back after using the toilet. This helps keep bacteria from getting into the urethra.    Use condoms during sex. These help prevent UTIs caused by sexually transmitted bacteria. Also, avoid using spermicides during sex. These can increase the risk of UTIs. Choose other forms of birth control instead. For women who tend to get UTIs after sex, a low-dose of a preventive antibiotic may be used. Be sure to discuss this option with your health care provider.    Follow up with your health care provider as directed. He or she may test to make sure the infection has cleared. If necessary, additional treatment may be started.    4081-1477 [employer]. [address] [place] [address], [place] [address]. All rights reserved. This information is not intended as a substitute for professional medical care. Always follow your healthcare professional's instructions.              Return in about 1 week (around 8/17/2020) for Lab Work.    Adrianna Watson NP  [facility]

## 2020-08-10 NOTE — PATIENT INSTRUCTIONS
Urine showed infection   Antibiotic bactrim twice a day for 5 days   OVER THE COUNTER AZO or pyridium for bladder spasm/pain - turns urine orange  Push fluids   Follow up if not improved after antibiotic  Will culture out urine- make sure antibiotic is the right one      Repeat labs in 1-2 weeks       Urinary Tract Infections in Women  Urinary tract infections (UTIs) are most often caused by bacteria (germs). These bacteria enter the urinary tract. The bacteria may come from outside the body. Or they may travel from the skin outside the rectum or vagina into the urethra. Female anatomy makes it easier for bacteria from the bowel to enter a woman s urinary tract, which is the most common source of UTI. This means women develop UTIs more often than men. Pain in or around the urinary tract is a common UTI symptom. But the only way to know for sure if you have a UTI for the health care provider to test your urine. The two tests that may be done are the urinalysis and urine culture.  Types of UTIs    Cystitis: A bladder infection (cystitis) is the most common UTI in women. You may have urgent or frequent urination. You may also have pain, burning when you urinate, and bloody urine.    Urethritis: This is an inflamed urethra, which is the tube that carries urine from the bladder to outside the body. You may have lower stomach or back pain. You may also have urgent or frequent urination.    Pyelonephritis: This is a kidney infection. If not treated, it can be serious and damage your kidneys. In severe cases, you may be hospitalized. You may have a fever and lower back pain.  Medications to treat a UTI  Most UTIs are treated with antibiotics. These kill the bacteria. The length of time you need to take them depends on the type of infection. It may be as short as 3 days. If you have repeated UTIs, a low-dose antibiotic may be needed for several months. Take antibiotics exactly as directed. Don t stop taking them until all of  the medication is gone. If you stop taking the antibiotic too soon, the infection may not go away, and you may develop a resistance to the antibiotic. This can make it much harder to treat.  Lifestyle changes to treat and prevent UTIs  The lifestyle changes below will help get rid of your UTI. They may also help prevent future UTIs.    Drink plenty of fluids. This includes water, juice, or other caffeine-free drinks. Fluids help flush bacteria out of your body.    Empty your bladder. Always empty your bladder when you feel the urge to urinate. And always urinate before going to sleep. Urine that stays in your bladder can lead to infection. Try to urinate before and after sex as well.    Practice good personal hygiene. Wipe yourself from front to back after using the toilet. This helps keep bacteria from getting into the urethra.    Use condoms during sex. These help prevent UTIs caused by sexually transmitted bacteria. Also, avoid using spermicides during sex. These can increase the risk of UTIs. Choose other forms of birth control instead. For women who tend to get UTIs after sex, a low-dose of a preventive antibiotic may be used. Be sure to discuss this option with your health care provider.    Follow up with your health care provider as directed. He or she may test to make sure the infection has cleared. If necessary, additional treatment may be started.    8119-6906 [employer]. [address] [place] [address], [place] [address]. All rights reserved. This information is not intended as a substitute for professional medical care. Always follow your healthcare professional's instructions.

## 2020-08-11 LAB
BACTERIA SPEC CULT: ABNORMAL
Lab: ABNORMAL
SPECIMEN SOURCE: ABNORMAL

## 2020-08-24 ENCOUNTER — VIRTUAL VISIT (OUTPATIENT)
Dept: FAMILY MEDICINE | Facility: CLINIC | Age: 65
End: 2020-08-24
Payer: COMMERCIAL

## 2020-08-24 DIAGNOSIS — L50.9 HIVES: Primary | ICD-10-CM

## 2020-08-24 PROCEDURE — 99214 OFFICE O/P EST MOD 30 MIN: CPT | Mod: 95 | Performed by: NURSE PRACTITIONER

## 2020-08-24 RX ORDER — PREDNISONE 20 MG/1
TABLET ORAL
Qty: 20 TABLET | Refills: 0 | Status: SHIPPED | OUTPATIENT
Start: 2020-08-24 | End: 2020-09-09

## 2020-08-24 NOTE — PROGRESS NOTES
"Fabi Rizzo is a 65 year old female who is being evaluated via a billable video visit.      The patient has been notified of following:     \"This video visit will be conducted via a call between you and your physician/provider. We have found that certain health care needs can be provided without the need for an in-person physical exam.  This service lets us provide the care you need with a video conversation.  If a prescription is necessary we can send it directly to your pharmacy.  If lab work is needed we can place an order for that and you can then stop by our lab to have the test done at a later time.    Video visits are billed at different rates depending on your insurance coverage.  Please reach out to your insurance provider with any questions.    If during the course of the call the physician/provider feels a video visit is not appropriate, you will not be charged for this service.\"    Patient has given verbal consent for Video visit? Yes  How would you like to obtain your AVS? MyChart  If you are dropped from the video visit, the video invite should be resent to: Text to cell phone: [phone]  Will anyone else be joining your video visit? No    Subjective     Fabi Rizzo is a 65 year old female who presents today via video visit for the following health issues:    HPI    Rash  Onset/Duration: 2 days  Description  Location: face, arms, legs, trunk  Character: round, raised, pink in color, white center   Itching: severe  Intensity:  severe  Progression of Symptoms:  worsening  Accompanying signs and symptoms:   Fever: no  Body aches or joint pain: no  Sore throat symptoms: no  Recent cold symptoms: no  History:           Previous episodes of similar rash: None  New exposures:  None  Recent travel: no  Exposure to similar rash: no  Precipitating or alleviating factors: thinks she may have gotten spider bites possibly that now has given her hives  Therapies tried and outcome: " Benadryl/diphenhydramine spray-  Took itch away for a while  Has an insect bite on left side of face 2 nights ago  Left side of face and lips were swollen- this is better   No trouble breathing   Now has what she describes as hives on arms, legs and torso     Video Start Time: Start: 08/24/2020 01:10 pm       Review of Systems   Constitutional, HEENT, cardiovascular, pulmonary, gi and gu systems are negative, except as otherwise noted.      Objective           Vitals:  No vitals were obtained today due to virtual visit.    Physical Exam     GENERAL: Healthy, alert and no distress  EYES: Eyes grossly normal to inspection.  No discharge or erythema, or obvious scleral/conjunctival abnormalities.  RESP: No audible wheeze, cough, or visible cyanosis.  No visible retractions or increased work of breathing.    SKIN: unable to visualize hives  NEURO: Cranial nerves grossly intact.  Mentation and speech appropriate for age.  PSYCH: Mentation appears normal, affect normal/bright, judgement and insight intact, normal speech and appearance well-groomed.      No results found for this or any previous visit (from the past 24 hour(s)).        Assessment & Plan     Hives  Related to insect bite?  Asked to attach picture to mychart as difficult to assess   Will treat based on history and patient description   Will treat with prednisone taper     - predniSONE (DELTASONE) 20 MG tablet  Dispense: 20 tablet; Refill: 0         Patient Instructions   Will treat with prednisone taper   Ok to continue as needed benadryl       Patient Education     Understanding Hives (Urticaria)    Urticaria (hives) are red, itchy, and swollen areas on the skin. They are most often an allergic reaction from eating a food or taking a medicine. Sometimes the cause may be unknown. A single hive can vary in size from a half inch to several inches. Hives can appear all over the body. Or they may appear on only one part of the body.  What causes hives?  Hives  can be caused by food and beverages such as:    Tree nuts (almonds, walnuts, hazelnuts)    Peanuts    Eggs    Shellfish    Milk  Hives can also be caused by medicines such as:    Antibiotics, especially penicillin and sulfa-based medicines     Anticonvulsant or antiseizure medicines     Chemotherapy medicines   Other causes of hives include:    Infection or virus    Heat    Cold air or cold water    Exercise    Scratching or rubbing your skin, or wearing tight-fitting clothes that rub your skin    Being exposed to sunlight or light from a light bulb, in rare cases    Inhaled-chemicals in the environment from foods and drugs, insects, plants, or other sources  In some cases, hives may occur again and again with no specific cause.  If you have hives    Stay away from the food, drink, medicine, or other thing that may be causing the hives.    Ask your healthcare provider how to control itchy or irritated skin.    Talk with your healthcare provider right away if you think a medicine gave you hives.  Watch for anaphylaxis  If you have hives, watch for symptoms of a severe reaction that can affect your entire body. This is called anaphylaxis. Symptoms can include swollen areas of the body, wheezing, trouble breathing or swallowing, and a hoarse voice. This reaction may happen right away. Or it may happen in an hour or more. In extreme cases, the airways from mouth to lungs may swell and make breathing difficult. This is a medical emergency. Use epinephrine medicine if you have it, and call 911 or go to the emergency room.     When to call your healthcare provider  Call your healthcare provider if:    Your hives feel uncomfortable    You have never had hives before    Your symptoms don't go away or come back    Your symptoms get worse or new symptoms develop such as:   ? Sneezing, coughing, runny or stuffy nose  ? Itching of the eyes, nose, or roof of the mouth  ? Itching, burning, stinging, or pain  ? Dry, flaky,  cracking, or scaly skin  ? Red or purple spots  Call 911  Call 911 right away if you have:    Swelling in your lips, tongue, or throat, called angioedema    Drooling    Trouble breathing, talking, or swallowing    Cool, moist or pale (blue in color) skin    Fast and weak heartbeat    Wheezing or short of breath    Feeling lightheaded or confused    Diarrhea    Severe nausea or vomiting    Seizure    Feeling dizzy or weak, or a sudden drop in blood pressure  Date Last Reviewed: 4/1/2017 2000-2019 [employer]. [address] [place], [place] [id number]. All rights reserved. This information is not intended as a substitute for professional medical care. Always follow your healthcare professional's instructions.               No follow-ups on file.    Adrianna Watson NP  [facility]      Video-Visit Details    Type of service:  Video Visit    Video End Time:Stop: 08/24/2020 01:20 pm    Originating Location (pt. Location): Home    Distant Location (provider location):  [facility]     Platform used for Video Visit: Selvin

## 2020-08-24 NOTE — PATIENT INSTRUCTIONS
Will treat with prednisone taper   Ok to continue as needed benadryl       Patient Education     Understanding Hives (Urticaria)    Urticaria (hives) are red, itchy, and swollen areas on the skin. They are most often an allergic reaction from eating a food or taking a medicine. Sometimes the cause may be unknown. A single hive can vary in size from a half inch to several inches. Hives can appear all over the body. Or they may appear on only one part of the body.  What causes hives?  Hives can be caused by food and beverages such as:    Tree nuts (almonds, walnuts, hazelnuts)    Peanuts    Eggs    Shellfish    Milk  Hives can also be caused by medicines such as:    Antibiotics, especially penicillin and sulfa-based medicines     Anticonvulsant or antiseizure medicines     Chemotherapy medicines   Other causes of hives include:    Infection or virus    Heat    Cold air or cold water    Exercise    Scratching or rubbing your skin, or wearing tight-fitting clothes that rub your skin    Being exposed to sunlight or light from a light bulb, in rare cases    Inhaled-chemicals in the environment from foods and drugs, insects, plants, or other sources  In some cases, hives may occur again and again with no specific cause.  If you have hives    Stay away from the food, drink, medicine, or other thing that may be causing the hives.    Ask your healthcare provider how to control itchy or irritated skin.    Talk with your healthcare provider right away if you think a medicine gave you hives.  Watch for anaphylaxis  If you have hives, watch for symptoms of a severe reaction that can affect your entire body. This is called anaphylaxis. Symptoms can include swollen areas of the body, wheezing, trouble breathing or swallowing, and a hoarse voice. This reaction may happen right away. Or it may happen in an hour or more. In extreme cases, the airways from mouth to lungs may swell and make breathing difficult. This is a medical  emergency. Use epinephrine medicine if you have it, and call 911 or go to the emergency room.     When to call your healthcare provider  Call your healthcare provider if:    Your hives feel uncomfortable    You have never had hives before    Your symptoms don't go away or come back    Your symptoms get worse or new symptoms develop such as:   ? Sneezing, coughing, runny or stuffy nose  ? Itching of the eyes, nose, or roof of the mouth  ? Itching, burning, stinging, or pain  ? Dry, flaky, cracking, or scaly skin  ? Red or purple spots  Call 911  Call 911 right away if you have:    Swelling in your lips, tongue, or throat, called angioedema    Drooling    Trouble breathing, talking, or swallowing    Cool, moist or pale (blue in color) skin    Fast and weak heartbeat    Wheezing or short of breath    Feeling lightheaded or confused    Diarrhea    Severe nausea or vomiting    Seizure    Feeling dizzy or weak, or a sudden drop in blood pressure  Date Last Reviewed: 4/1/2017 2000-2019 [employer]. [address] [place] [address], [place] [address]. All rights reserved. This information is not intended as a substitute for professional medical care. Always follow your healthcare professional's instructions.

## 2020-08-26 DIAGNOSIS — J45.20 MILD INTERMITTENT ASTHMA WITHOUT COMPLICATION: Chronic | ICD-10-CM

## 2020-08-26 RX ORDER — FLUTICASONE PROPIONATE AND SALMETEROL 113; 14 UG/1; UG/1
POWDER, METERED RESPIRATORY (INHALATION)
Qty: 3 INHALER | Refills: 3 | Status: SHIPPED | OUTPATIENT
Start: 2020-08-26

## 2020-08-31 ENCOUNTER — HOSPITAL ENCOUNTER (INPATIENT)
Facility: CLINIC | Age: 65
LOS: 2 days | Discharge: HOME | End: 2020-09-02
Attending: FAMILY MEDICINE | Admitting: FAMILY MEDICINE
Payer: COMMERCIAL

## 2020-08-31 DIAGNOSIS — K85.20 ALCOHOL-INDUCED ACUTE PANCREATITIS, UNSPECIFIED COMPLICATION STATUS: ICD-10-CM

## 2020-08-31 PROBLEM — K85.90 ACUTE PANCREATITIS: Status: ACTIVE | Noted: 2020-08-31

## 2020-08-31 LAB
ALBUMIN SERPL-MCNC: 4 G/DL (ref 3.4–5)
ALP SERPL-CCNC: 102 U/L (ref 40–150)
ALT SERPL W P-5'-P-CCNC: 67 U/L (ref 0–50)
ANION GAP SERPL CALCULATED.3IONS-SCNC: 5 MMOL/L (ref 3–14)
AST SERPL W P-5'-P-CCNC: 67 U/L (ref 0–45)
BASOPHILS # BLD AUTO: 0 10E9/L (ref 0–0.2)
BASOPHILS NFR BLD AUTO: 0.2 %
BILIRUB SERPL-MCNC: 0.8 MG/DL (ref 0.2–1.3)
BUN SERPL-MCNC: 27 MG/DL (ref 7–30)
CALCIUM SERPL-MCNC: 10 MG/DL (ref 8.5–10.1)
CHLORIDE SERPL-SCNC: 102 MMOL/L (ref 94–109)
CO2 SERPL-SCNC: 26 MMOL/L (ref 20–32)
CREAT SERPL-MCNC: 1.03 MG/DL (ref 0.52–1.04)
DIFFERENTIAL METHOD BLD: ABNORMAL
EOSINOPHIL # BLD AUTO: 0.2 10E9/L (ref 0–0.7)
EOSINOPHIL NFR BLD AUTO: 1.7 %
ERYTHROCYTE [DISTWIDTH] IN BLOOD BY AUTOMATED COUNT: 12.3 % (ref 10–15)
ETHANOL SERPL-MCNC: <0.01 G/DL
GFR SERPL CREATININE-BSD FRML MDRD: 57 ML/MIN/{1.73_M2}
GLUCOSE SERPL-MCNC: 111 MG/DL (ref 70–99)
HCT VFR BLD AUTO: 48.1 % (ref 35–47)
HGB BLD-MCNC: 16 G/DL (ref 11.7–15.7)
IMM GRANULOCYTES # BLD: 0 10E9/L (ref 0–0.4)
IMM GRANULOCYTES NFR BLD: 0.4 %
LIPASE SERPL-CCNC: 6580 U/L (ref 73–393)
LYMPHOCYTES # BLD AUTO: 1.9 10E9/L (ref 0.8–5.3)
LYMPHOCYTES NFR BLD AUTO: 20.8 %
MCH RBC QN AUTO: 35.3 PG (ref 26.5–33)
MCHC RBC AUTO-ENTMCNC: 33.3 G/DL (ref 31.5–36.5)
MCV RBC AUTO: 106 FL (ref 78–100)
MONOCYTES # BLD AUTO: 1.2 10E9/L (ref 0–1.3)
MONOCYTES NFR BLD AUTO: 12.7 %
NEUTROPHILS # BLD AUTO: 5.8 10E9/L (ref 1.6–8.3)
NEUTROPHILS NFR BLD AUTO: 64.2 %
NRBC # BLD AUTO: 0 10*3/UL
NRBC BLD AUTO-RTO: 0 /100
PLATELET # BLD AUTO: 223 10E9/L (ref 150–450)
POTASSIUM SERPL-SCNC: 4.1 MMOL/L (ref 3.4–5.3)
PROT SERPL-MCNC: 8.2 G/DL (ref 6.8–8.8)
RBC # BLD AUTO: 4.53 10E12/L (ref 3.8–5.2)
SODIUM SERPL-SCNC: 133 MMOL/L (ref 133–144)
WBC # BLD AUTO: 9 10E9/L (ref 4–11)

## 2020-08-31 PROCEDURE — 25000128 H RX IP 250 OP 636: Performed by: FAMILY MEDICINE

## 2020-08-31 PROCEDURE — 25800030 ZZH RX IP 258 OP 636: Performed by: FAMILY MEDICINE

## 2020-08-31 PROCEDURE — 99223 1ST HOSP IP/OBS HIGH 75: CPT | Mod: AI | Performed by: FAMILY MEDICINE

## 2020-08-31 PROCEDURE — C9803 HOPD COVID-19 SPEC COLLECT: HCPCS | Performed by: FAMILY MEDICINE

## 2020-08-31 PROCEDURE — 80053 COMPREHEN METABOLIC PANEL: CPT | Performed by: FAMILY MEDICINE

## 2020-08-31 PROCEDURE — 85025 COMPLETE CBC W/AUTO DIFF WBC: CPT | Performed by: FAMILY MEDICINE

## 2020-08-31 PROCEDURE — U0003 INFECTIOUS AGENT DETECTION BY NUCLEIC ACID (DNA OR RNA); SEVERE ACUTE RESPIRATORY SYNDROME CORONAVIRUS 2 (SARS-COV-2) (CORONAVIRUS DISEASE [COVID-19]), AMPLIFIED PROBE TECHNIQUE, MAKING USE OF HIGH THROUGHPUT TECHNOLOGIES AS DESCRIBED BY CMS-2020-01-R: HCPCS | Performed by: FAMILY MEDICINE

## 2020-08-31 PROCEDURE — 12000000 ZZH R&B MED SURG/OB

## 2020-08-31 PROCEDURE — 82565 ASSAY OF CREATININE: CPT | Performed by: FAMILY MEDICINE

## 2020-08-31 PROCEDURE — 99284 EMERGENCY DEPT VISIT MOD MDM: CPT | Mod: Z6 | Performed by: FAMILY MEDICINE

## 2020-08-31 PROCEDURE — 99285 EMERGENCY DEPT VISIT HI MDM: CPT | Mod: 25 | Performed by: FAMILY MEDICINE

## 2020-08-31 PROCEDURE — 25000132 ZZH RX MED GY IP 250 OP 250 PS 637: Performed by: FAMILY MEDICINE

## 2020-08-31 PROCEDURE — 96361 HYDRATE IV INFUSION ADD-ON: CPT | Performed by: FAMILY MEDICINE

## 2020-08-31 PROCEDURE — HZ2ZZZZ DETOXIFICATION SERVICES FOR SUBSTANCE ABUSE TREATMENT: ICD-10-PCS | Performed by: FAMILY MEDICINE

## 2020-08-31 PROCEDURE — 96374 THER/PROPH/DIAG INJ IV PUSH: CPT | Performed by: FAMILY MEDICINE

## 2020-08-31 PROCEDURE — 83690 ASSAY OF LIPASE: CPT | Performed by: FAMILY MEDICINE

## 2020-08-31 PROCEDURE — 80320 DRUG SCREEN QUANTALCOHOLS: CPT | Performed by: FAMILY MEDICINE

## 2020-08-31 RX ORDER — MULTIPLE VITAMINS W/ MINERALS TAB 9MG-400MCG
1 TAB ORAL DAILY
Status: DISCONTINUED | OUTPATIENT
Start: 2020-09-01 | End: 2020-09-02 | Stop reason: HOSPADM

## 2020-08-31 RX ORDER — CALCIUM CARBONATE 500(1250)
500 TABLET ORAL 2 TIMES DAILY WITH MEALS
Status: DISCONTINUED | OUTPATIENT
Start: 2020-09-01 | End: 2020-09-02 | Stop reason: HOSPADM

## 2020-08-31 RX ORDER — KETOROLAC TROMETHAMINE 15 MG/ML
15 INJECTION, SOLUTION INTRAMUSCULAR; INTRAVENOUS ONCE
Status: COMPLETED | OUTPATIENT
Start: 2020-08-31 | End: 2020-08-31

## 2020-08-31 RX ORDER — ALLOPURINOL 100 MG/1
100 TABLET ORAL DAILY
Status: DISCONTINUED | OUTPATIENT
Start: 2020-09-01 | End: 2020-09-02 | Stop reason: HOSPADM

## 2020-08-31 RX ORDER — LISINOPRIL 2.5 MG/1
2.5 TABLET ORAL DAILY
Status: DISCONTINUED | OUTPATIENT
Start: 2020-09-01 | End: 2020-09-02 | Stop reason: HOSPADM

## 2020-08-31 RX ORDER — PREDNISONE 10 MG/1
10 TABLET ORAL DAILY
Status: COMPLETED | OUTPATIENT
Start: 2020-09-01 | End: 2020-09-02

## 2020-08-31 RX ORDER — FOLIC ACID 1 MG/1
1 TABLET ORAL DAILY
Status: DISCONTINUED | OUTPATIENT
Start: 2020-09-01 | End: 2020-09-02 | Stop reason: HOSPADM

## 2020-08-31 RX ORDER — CHLORAL HYDRATE 500 MG
1 CAPSULE ORAL DAILY
COMMUNITY

## 2020-08-31 RX ORDER — METOPROLOL TARTRATE 25 MG/1
50 TABLET, FILM COATED ORAL DAILY
Status: DISCONTINUED | OUTPATIENT
Start: 2020-09-01 | End: 2020-09-02 | Stop reason: HOSPADM

## 2020-08-31 RX ORDER — LANOLIN ALCOHOL/MO/W.PET/CERES
100 CREAM (GRAM) TOPICAL DAILY
Status: DISCONTINUED | OUTPATIENT
Start: 2020-09-01 | End: 2020-09-02 | Stop reason: HOSPADM

## 2020-08-31 RX ORDER — ONDANSETRON 4 MG/1
4 TABLET, ORALLY DISINTEGRATING ORAL EVERY 6 HOURS PRN
Status: DISCONTINUED | OUTPATIENT
Start: 2020-08-31 | End: 2020-09-02 | Stop reason: HOSPADM

## 2020-08-31 RX ORDER — SODIUM CHLORIDE, SODIUM LACTATE, POTASSIUM CHLORIDE, CALCIUM CHLORIDE 600; 310; 30; 20 MG/100ML; MG/100ML; MG/100ML; MG/100ML
INJECTION, SOLUTION INTRAVENOUS CONTINUOUS
Status: DISCONTINUED | OUTPATIENT
Start: 2020-08-31 | End: 2020-09-02 | Stop reason: HOSPADM

## 2020-08-31 RX ORDER — OXYCODONE HYDROCHLORIDE 5 MG/1
5-10 TABLET ORAL
Status: DISCONTINUED | OUTPATIENT
Start: 2020-08-31 | End: 2020-09-02 | Stop reason: HOSPADM

## 2020-08-31 RX ORDER — ONDANSETRON 2 MG/ML
4 INJECTION INTRAMUSCULAR; INTRAVENOUS EVERY 6 HOURS PRN
Status: DISCONTINUED | OUTPATIENT
Start: 2020-08-31 | End: 2020-09-02 | Stop reason: HOSPADM

## 2020-08-31 RX ORDER — MONTELUKAST SODIUM 10 MG/1
10 TABLET ORAL EVERY MORNING
Status: DISCONTINUED | OUTPATIENT
Start: 2020-09-01 | End: 2020-09-02 | Stop reason: HOSPADM

## 2020-08-31 RX ORDER — ASPIRIN 81 MG/1
81 TABLET, CHEWABLE ORAL DAILY
Status: DISCONTINUED | OUTPATIENT
Start: 2020-09-01 | End: 2020-09-02 | Stop reason: HOSPADM

## 2020-08-31 RX ORDER — NALOXONE HYDROCHLORIDE 0.4 MG/ML
.1-.4 INJECTION, SOLUTION INTRAMUSCULAR; INTRAVENOUS; SUBCUTANEOUS
Status: DISCONTINUED | OUTPATIENT
Start: 2020-08-31 | End: 2020-09-02 | Stop reason: HOSPADM

## 2020-08-31 RX ORDER — AMOXICILLIN 250 MG
1 CAPSULE ORAL 2 TIMES DAILY PRN
Status: DISCONTINUED | OUTPATIENT
Start: 2020-08-31 | End: 2020-09-02 | Stop reason: HOSPADM

## 2020-08-31 RX ORDER — AMOXICILLIN 250 MG
2 CAPSULE ORAL 2 TIMES DAILY PRN
Status: DISCONTINUED | OUTPATIENT
Start: 2020-08-31 | End: 2020-09-02 | Stop reason: HOSPADM

## 2020-08-31 RX ORDER — LIDOCAINE 40 MG/G
CREAM TOPICAL
Status: DISCONTINUED | OUTPATIENT
Start: 2020-08-31 | End: 2020-09-02 | Stop reason: HOSPADM

## 2020-08-31 RX ORDER — ALBUTEROL SULFATE 90 UG/1
1-2 AEROSOL, METERED RESPIRATORY (INHALATION) EVERY 6 HOURS PRN
Status: DISCONTINUED | OUTPATIENT
Start: 2020-08-31 | End: 2020-09-02 | Stop reason: HOSPADM

## 2020-08-31 RX ORDER — BUPROPION HYDROCHLORIDE 150 MG/1
150 TABLET, EXTENDED RELEASE ORAL EVERY MORNING
Status: DISCONTINUED | OUTPATIENT
Start: 2020-09-01 | End: 2020-09-02 | Stop reason: HOSPADM

## 2020-08-31 RX ORDER — LORAZEPAM 2 MG/ML
1-2 INJECTION INTRAMUSCULAR EVERY 30 MIN PRN
Status: DISCONTINUED | OUTPATIENT
Start: 2020-08-31 | End: 2020-09-02 | Stop reason: HOSPADM

## 2020-08-31 RX ORDER — LORAZEPAM 1 MG/1
1-2 TABLET ORAL EVERY 30 MIN PRN
Status: DISCONTINUED | OUTPATIENT
Start: 2020-08-31 | End: 2020-09-02 | Stop reason: HOSPADM

## 2020-08-31 RX ORDER — FLUTICASONE PROPIONATE AND SALMETEROL 113; 14 UG/1; UG/1
1 POWDER, METERED RESPIRATORY (INHALATION) EVERY 12 HOURS
Status: DISCONTINUED | OUTPATIENT
Start: 2020-08-31 | End: 2020-08-31 | Stop reason: CLARIF

## 2020-08-31 RX ADMIN — SODIUM CHLORIDE, POTASSIUM CHLORIDE, SODIUM LACTATE AND CALCIUM CHLORIDE 1000 ML: 600; 310; 30; 20 INJECTION, SOLUTION INTRAVENOUS at 21:15

## 2020-08-31 RX ADMIN — OXYCODONE HYDROCHLORIDE 5 MG: 5 TABLET ORAL at 23:37

## 2020-08-31 RX ADMIN — KETOROLAC TROMETHAMINE 15 MG: 15 INJECTION, SOLUTION INTRAMUSCULAR; INTRAVENOUS at 20:47

## 2020-08-31 RX ADMIN — FAMOTIDINE 20 MG: 20 INJECTION, SOLUTION INTRAVENOUS at 23:30

## 2020-08-31 RX ADMIN — SODIUM CHLORIDE, POTASSIUM CHLORIDE, SODIUM LACTATE AND CALCIUM CHLORIDE 1000 ML: 600; 310; 30; 20 INJECTION, SOLUTION INTRAVENOUS at 23:30

## 2020-08-31 RX ADMIN — ENOXAPARIN SODIUM 40 MG: 40 INJECTION SUBCUTANEOUS at 23:00

## 2020-08-31 ASSESSMENT — MIFFLIN-ST. JEOR
SCORE: 1152
SCORE: 1147.36

## 2020-09-01 LAB
ALBUMIN SERPL-MCNC: 2.7 G/DL (ref 3.4–5)
ALP SERPL-CCNC: 70 U/L (ref 40–150)
ALT SERPL W P-5'-P-CCNC: 41 U/L (ref 0–50)
ANION GAP SERPL CALCULATED.3IONS-SCNC: 5 MMOL/L (ref 3–14)
AST SERPL W P-5'-P-CCNC: 41 U/L (ref 0–45)
BILIRUB SERPL-MCNC: 0.8 MG/DL (ref 0.2–1.3)
BUN SERPL-MCNC: 22 MG/DL (ref 7–30)
CALCIUM SERPL-MCNC: 8.4 MG/DL (ref 8.5–10.1)
CHLORIDE SERPL-SCNC: 107 MMOL/L (ref 94–109)
CO2 SERPL-SCNC: 26 MMOL/L (ref 20–32)
CREAT SERPL-MCNC: 0.83 MG/DL (ref 0.52–1.04)
ERYTHROCYTE [DISTWIDTH] IN BLOOD BY AUTOMATED COUNT: 12.6 % (ref 10–15)
GFR SERPL CREATININE-BSD FRML MDRD: 74 ML/MIN/{1.73_M2}
GLUCOSE SERPL-MCNC: 82 MG/DL (ref 70–99)
HCT VFR BLD AUTO: 36.9 % (ref 35–47)
HGB BLD-MCNC: 12.2 G/DL (ref 11.7–15.7)
MCH RBC QN AUTO: 35.1 PG (ref 26.5–33)
MCHC RBC AUTO-ENTMCNC: 33.1 G/DL (ref 31.5–36.5)
MCV RBC AUTO: 106 FL (ref 78–100)
PLATELET # BLD AUTO: 158 10E9/L (ref 150–450)
POTASSIUM SERPL-SCNC: 4 MMOL/L (ref 3.4–5.3)
PROT SERPL-MCNC: 5.7 G/DL (ref 6.8–8.8)
RBC # BLD AUTO: 3.48 10E12/L (ref 3.8–5.2)
SODIUM SERPL-SCNC: 138 MMOL/L (ref 133–144)
WBC # BLD AUTO: 7.1 10E9/L (ref 4–11)

## 2020-09-01 PROCEDURE — 99232 SBSQ HOSP IP/OBS MODERATE 35: CPT | Performed by: INTERNAL MEDICINE

## 2020-09-01 PROCEDURE — 25000132 ZZH RX MED GY IP 250 OP 250 PS 637: Performed by: INTERNAL MEDICINE

## 2020-09-01 PROCEDURE — 25000131 ZZH RX MED GY IP 250 OP 636 PS 637: Performed by: FAMILY MEDICINE

## 2020-09-01 PROCEDURE — 25000132 ZZH RX MED GY IP 250 OP 250 PS 637: Performed by: FAMILY MEDICINE

## 2020-09-01 PROCEDURE — 12000000 ZZH R&B MED SURG/OB

## 2020-09-01 PROCEDURE — 25800030 ZZH RX IP 258 OP 636: Performed by: INTERNAL MEDICINE

## 2020-09-01 PROCEDURE — 25800030 ZZH RX IP 258 OP 636: Performed by: FAMILY MEDICINE

## 2020-09-01 PROCEDURE — 85027 COMPLETE CBC AUTOMATED: CPT | Performed by: FAMILY MEDICINE

## 2020-09-01 PROCEDURE — 25000125 ZZHC RX 250: Performed by: FAMILY MEDICINE

## 2020-09-01 PROCEDURE — 80053 COMPREHEN METABOLIC PANEL: CPT | Performed by: FAMILY MEDICINE

## 2020-09-01 PROCEDURE — 36415 COLL VENOUS BLD VENIPUNCTURE: CPT | Performed by: FAMILY MEDICINE

## 2020-09-01 PROCEDURE — 25000128 H RX IP 250 OP 636: Performed by: FAMILY MEDICINE

## 2020-09-01 RX ORDER — FAMOTIDINE 10 MG
10 TABLET ORAL 2 TIMES DAILY
Status: DISCONTINUED | OUTPATIENT
Start: 2020-09-01 | End: 2020-09-02 | Stop reason: HOSPADM

## 2020-09-01 RX ADMIN — ALBUTEROL SULFATE 2 PUFF: 90 AEROSOL, METERED RESPIRATORY (INHALATION) at 05:04

## 2020-09-01 RX ADMIN — PREDNISONE 10 MG: 10 TABLET ORAL at 08:29

## 2020-09-01 RX ADMIN — FLUTICASONE FUROATE AND VILANTEROL TRIFENATATE 1 PUFF: 100; 25 POWDER RESPIRATORY (INHALATION) at 09:27

## 2020-09-01 RX ADMIN — ENOXAPARIN SODIUM 40 MG: 40 INJECTION SUBCUTANEOUS at 22:04

## 2020-09-01 RX ADMIN — OXYCODONE HYDROCHLORIDE 5 MG: 5 TABLET ORAL at 04:51

## 2020-09-01 RX ADMIN — FAMOTIDINE 10 MG: 10 TABLET, FILM COATED ORAL at 11:42

## 2020-09-01 RX ADMIN — FOLIC ACID: 5 INJECTION, SOLUTION INTRAMUSCULAR; INTRAVENOUS; SUBCUTANEOUS at 00:33

## 2020-09-01 RX ADMIN — FOLIC ACID 1 MG: 1 TABLET ORAL at 08:30

## 2020-09-01 RX ADMIN — ALLOPURINOL 100 MG: 100 TABLET ORAL at 08:29

## 2020-09-01 RX ADMIN — MONTELUKAST 10 MG: 10 TABLET, FILM COATED ORAL at 08:29

## 2020-09-01 RX ADMIN — THIAMINE HCL TAB 100 MG 100 MG: 100 TAB at 08:30

## 2020-09-01 RX ADMIN — FAMOTIDINE 10 MG: 10 TABLET, FILM COATED ORAL at 19:52

## 2020-09-01 RX ADMIN — LISINOPRIL 2.5 MG: 2.5 TABLET ORAL at 08:29

## 2020-09-01 RX ADMIN — SODIUM CHLORIDE, POTASSIUM CHLORIDE, SODIUM LACTATE AND CALCIUM CHLORIDE: 600; 310; 30; 20 INJECTION, SOLUTION INTRAVENOUS at 18:24

## 2020-09-01 RX ADMIN — OXYCODONE HYDROCHLORIDE 5 MG: 5 TABLET ORAL at 22:04

## 2020-09-01 RX ADMIN — METOPROLOL TARTRATE 50 MG: 25 TABLET, FILM COATED ORAL at 08:29

## 2020-09-01 RX ADMIN — MULTIPLE VITAMINS W/ MINERALS TAB 1 TABLET: TAB at 08:29

## 2020-09-01 RX ADMIN — BUPROPION HYDROCHLORIDE 150 MG: 150 TABLET, FILM COATED, EXTENDED RELEASE ORAL at 08:30

## 2020-09-01 RX ADMIN — SODIUM CHLORIDE, POTASSIUM CHLORIDE, SODIUM LACTATE AND CALCIUM CHLORIDE: 600; 310; 30; 20 INJECTION, SOLUTION INTRAVENOUS at 09:56

## 2020-09-01 RX ADMIN — ASPIRIN 81 MG 81 MG: 81 TABLET ORAL at 08:30

## 2020-09-01 RX ADMIN — CALCIUM 500 MG: 500 TABLET ORAL at 17:41

## 2020-09-01 ASSESSMENT — ACTIVITIES OF DAILY LIVING (ADL)
ADLS_ACUITY_SCORE: 11
ADLS_ACUITY_SCORE: 11
ADLS_ACUITY_SCORE: 13
ADLS_ACUITY_SCORE: 11

## 2020-09-01 NOTE — PROGRESS NOTES
CARE TRANSITION SOCIAL WORK INITIAL ASSESSMENT:    Referral received from case finding due to pt's history of ETOH use/abuse.       Met with: SW met with Patient via the phone due to PUI status and introduced self, title & role.    DATA  Principal Problem:    Acute alcoholic pancreatitis  Active Problems:    Benign essential hypertension    Intermittent asthma    GERD (gastroesophageal reflux disease)    Major depressive disorder, recurrent episode, mild (HCC)    Chronic low back pain    Tobacco use disorder    CKD (chronic kidney disease) stage 3, GFR 30-59 ml/min (H)    Alcohol abuse, daily use    Acute pancreatitis       Primary Care Clinic Name: [facility]  Primary Care MD Name: Terri Hill  Contact information and PCP information verified: Yes    ASSESSMENT  Cognitive Status: awake, alert and oriented.    Prior to hospitalization, pt lives in a private home with her spouse, Azeem.    Pt states she feels well supported by her spouse, friends & family.    Pt is not interested in receiving any information about alcohol cessation, claiming she is aware of the resources, if needed.     SW asked pt if there were any discharge care needs she may have & pt politely declined.    Description of Support System: Supportive   Who is your support system?:        Insurance Concerns: No Insurance issues identified    Transportation was discussed and will be provided by spouse.    PLAN:  Home with spouse.    KAILEY Heath  [facility] [phone]   [facility]  [phone]

## 2020-09-01 NOTE — PLAN OF CARE
"Pt afebrile. Rating abdominal pain 7/10, \"comes & goes\". Denies nausea. Up with SBA, voiding without problem. Taking oxycodone 5mg for pain with good relief. Pt NPO, has had few ice chips & small sip with meds. CIWA=0.  "

## 2020-09-01 NOTE — PROGRESS NOTES
[facility] [place]    Hospitalist Progress Note    Date of Service (when I saw the patient): 09/01/2020    Assessment & Plan   Fabi Rizzo is a 65 year old female who was admitted on 8/31/2020 with abdominal pain, found to have pancreatitis.    Acute alcoholic pancreatitis  Daily alcohol user as below, suspect flare due to increased use in the 48 hours prior to admission.  Identical to prior episodes but not quite as bad.  No red flags, last CT in 2013 showed no pseudocysts or abscess.  Agree with ER provider that this seems pretty clear-cut and that we can hold off on CT for now.  If worsening or not improving or if bili/alk phos/liver enyzmes are worsening then would proceed with CT abdomen or US.   Getting first 1L LR in ER, will give a 2nd liter bolus then 250/h.  Reassess tomorrow.  Dilaudid prn for pain with oxycodone ordered - hope to wean off dilaudid.  - Abdominal pain improved, LFTs normalized  - Start clear liquids, ADAT  - Stress need to refrain from alcohol excess      Daily alcohol use/abuse  2 drinks daily, occasionally more.  Recommend quitting in the short term, and at least cutting back long-term given pancreatitis.  No apparent withdrawal at present, but started on CIWA with IV followed by oral vitamins, follow.       Mildly elevated liver function tests  Suspect due to alcohol.  Follow.      - Normalized     Recent hives  Diagnosed via recent virtual visit and started on prednisone taper which is almost complete, will finish last 1 days of 10 mg (ordered), rash resolved.         Benign essential hypertension  Blood pressure stable, continue home metoprolol and lisinopril.       Intermittent asthma  At baseline - continue home fluticasone/salmeterol, singulair and albuterol prn     GERD (gastroesophageal reflux disease)  Doing well, ordered famotidine IV for now given NPO status and pancreatitis, no scheduled medications at home.       Major depressive disorder, recurrent  episode, mild (HCC)  Mood and affect stable, no suicidal ideation - continue home wellbutrin      Chronic low back pain  No complaints of this today.       Tobacco use disorder  3 cigs per day, does not want replacement or help quitting at present.        CKD (chronic kidney disease) stage 3, GFR 30-59 ml/min (H)  Stable.      Asymptomatic COVID screen pending.        DVT Prophylaxis: Enoxaparin (Lovenox) SQ  Code Status: Full Code    Disposition: Expected discharge in 1-2 days once abdominal pain improved and tolerating diet.    Mahendra Gurrola    Interval History   The patient is resting in bed.  Her abdominal pain has improved and she is willing to try clear liquids.    -Data reviewed today: I reviewed all new labs and imaging results over the last 24 hours. I personally reviewed no images or EKG's today.    Physical Exam   Temp: 96.5  F (35.8  C) Temp src: Oral BP: 135/83 Pulse: 73   Resp: 18 SpO2: 96 % O2 Device: None (Room air)    Vitals:    08/31/20 1913 08/31/20 2250   Weight: 61.2 kg (135 lb) 61.7 kg (136 lb 0.4 oz)     Vital Signs with Ranges  Temp:  [96.5  F (35.8  C)-98.3  F (36.8  C)] 96.5  F (35.8  C)  Pulse:  [73-93] 73  Resp:  [16-18] 18  BP: (104-135)/(59-87) 135/83  SpO2:  [96 %-99 %] 96 %  I/O last 3 completed shifts:  In: 1499 [P.O.:60; I.V.:1439]  Out: -     Gen: Well nourished, well developed, alert and oriented x 3, no acute distressed  HEENT: Atraumatic, normocephalic; sclera non-injected, anicterric; oral mucosa moist, no lesion, no exudate  Lungs: Clear to ausculation, no wheezes, no rhonchi, no rales  Heart: Regular rate, regular rhythm, no gallops, no rubs, no murmurs  GI: Bowel sound normal, no hepatosplenomegaly, no masses, non-tender, non-distended, no guarding, no rebound tenderness  Lymph: No lymphadenopathy, no edema  Skin: No rashes, no chronic venous stasis     Medications     lactated ringers 100 mL/hr at 09/01/20 1057       allopurinol  100 mg Oral Daily     aspirin  81 mg  Oral Daily     buPROPion  150 mg Oral QAM     calcium carbonate  500 mg Oral BID w/meals     enoxaparin ANTICOAGULANT  40 mg Subcutaneous Q24H     famotidine  10 mg Oral BID     fluticasone-vilanterol  1 puff Inhalation Daily     folic acid  1 mg Oral Daily     lisinopril  2.5 mg Oral Daily     metoprolol tartrate  50 mg Oral Daily     montelukast  10 mg Oral QAM     multivitamin w/minerals  1 tablet Oral Daily     predniSONE  10 mg Oral Daily     sodium chloride (PF)  3 mL Intracatheter Q8H     thiamine  100 mg Oral Daily       Data   Recent Labs   Lab 09/01/20  0527 08/31/20  1929   WBC 7.1 9.0   HGB 12.2 16.0*   * 106*    223    133   POTASSIUM 4.0 4.1   CHLORIDE 107 102   CO2 26 26   BUN 22 27   CR 0.83 1.03   ANIONGAP 5 5   QING 8.4* 10.0   GLC 82 111*   ALBUMIN 2.7* 4.0   PROTTOTAL 5.7* 8.2   BILITOTAL 0.8 0.8   ALKPHOS 70 102   ALT 41 67*   AST 41 67*   LIPASE  --  6,580*       No results found for this or any previous visit (from the past 24 hour(s)).

## 2020-09-01 NOTE — H&P
[facility] History and Physical    Fabi Rizzo MRN# [id number]   Age: 65 year old YOB: 1955     Date of Admission:  8/31/2020      Primary care provider: Terri Hill          Assessment and Plan:   Assessment & Plan         Acute alcoholic pancreatitis  Daily alcohol user as below, suspect flare due to increased use in the 48 hours prior to admission.  Identical to prior episodes but not quite as bad.  No red flags, last CT in 2013 showed no pseudocysts or abscess.  Agree with ER provider that this seems pretty clear-cut and that we can hold off on CT for now.  If worsening or not improving or if bili/alk phos/liver enyzmes are worsening then would proceed with CT abdomen or US.   Getting first 1L LR in ER, will give a 2nd liter bolus then 250/h.  Reassess tomorrow.  Dilaudid prn for pain with oxycodone ordered - hope to wean off dilaudid starting tomorrow.   NPO except medications and ice chips for now, hope to advance tomorrow if showing clear clinical improvement.      Daily alcohol use/abuse  2 drinks daily, occasionally more.  Recommend quitting in the short term, and at least cutting back long-term given pancreatitis.  No apparent withdrawal at present, but started on CIWA with IV followed by oral vitamins, follow.      Mildly elevated liver function tests  Suspect due to alcohol.  Follow.        Recent hives  Diagnosed via recent virtual visit and started on prednisone taper which is almost complete, will finish last 2 days of 10 mg (ordered), rash resolved.          Benign essential hypertension  Blood pressure stable, continue home metoprolol and lisinopril.        Intermittent asthma  At baseline - continue home fluticasone/salmeterol, singulair and albuterol prn      GERD (gastroesophageal reflux disease)  Doing well, ordered famotidine IV for now given NPO status and pancreatitis, no scheduled medications at home.        Major depressive disorder, recurrent episode,  "mild (HCC)  Mood and affect stable, no suicidal ideation - continue home wellbutrin       Chronic low back pain  No complaints of this today.        Tobacco use disorder  3 cigs per day, does not want replacement or help quitting at present.         CKD (chronic kidney disease) stage 3, GFR 30-59 ml/min (H)  Stable.           Asymptomatic COVID screen pending.       Prophylaxis  lovenox    Lines  PIV       Disposition  Anticipate 2-3 days inpatient.               Chief Complaint:   Abdominal pain   History is obtained from the patient          History of Present Illness:   This patient is a 65 year old  female with a significant past medical history of asthma, depression, GERD, and chronic kidney disease in addition to prior alcoholic pancreatitis who presents with abdominal pain.  Had been at baseline, other than some mild intermittent abdominal pain a few days ago.  Then yesterday had 4-5 drinks which is more than her normal 2 per day as well as a larger fatty meal last night, then started to get worsening waxing and waning abdominal pain this AM that has worsened throughout the day.  Up to 8/10 at worst, currently 4ish.  No fever or chills. No nausea or vomiting.  Taking sips of water ok, but pain worse with any food today.  Feels \"identical\" to her prior bouts of pancreatitis, but came in a bit sooner this time, last time pain was up to a 10/10 before she came in.  Completing a prednisone taper for hives, no other recent medication changes.     Smokes 2-3 cigarettes per day, drinks 2 drinks pretty much every day, occasionally more as happened yesterday but that's \"fairly unusual\".  No illicit drug use.               Past Medical History:   I have reviewed this patient's past medical history.  Patient Active Problem List    Diagnosis Date Noted     Acute alcoholic pancreatitis 08/31/2020     Priority: Medium     CKD (chronic kidney disease) stage 3, GFR 30-59 ml/min (H) 08/10/2020     Priority: Medium "     Encounter for smoking cessation counseling 10/25/2019     Priority: Medium     Seborrheic keratosis 10/25/2019     Priority: Medium     Atypical mole 10/25/2019     Priority: Medium     Healthcare maintenance 10/24/2019     Priority: Medium     Varicose veins of both lower extremities 10/17/2017     Priority: Medium     Chronic rhinitis 06/15/2016     Priority: Medium     Chronic gout of multiple sites, unspecified cause 06/15/2016     Priority: Medium     Tobacco use disorder 06/15/2016     Priority: Medium     Spondylolisthesis of lumbar region 09/03/2015     Priority: Medium     Lumbar foraminal stenosis 09/03/2015     Priority: Medium     Alcohol abuse, daily use 05/18/2013     Priority: Medium     HTN (hypertension) 05/18/2013     Priority: Medium     Alcohol use 11/16/2012     Priority: Medium     Chronic low back pain 11/16/2012     Priority: Medium     Pt referred to pain clinic.   Has been asking for pain meds for pain at different sites over the last year. Needs to see pain specialist.  Also, of note, drinks alcohol almost on a daily basis.   Poor compliance with appointments.       GERD (gastroesophageal reflux disease) 04/20/2011     Priority: Medium     Major depressive disorder, recurrent episode, mild (HCC) 04/20/2011     Priority: Medium     Intermittent asthma 01/13/2011     Priority: Medium     Benign essential hypertension 12/21/2010     Priority: Medium     CARDIOVASCULAR SCREENING; LDL GOAL LESS THAN 130 10/31/2010     Priority: Medium              Past Surgical History:   I have reviewed this patient's past surgical history   Past Surgical History:   Procedure Laterality Date     LAMINECTOMY LUMBAR POSTERIOR MICROSCOPIC ONE LEVEL N/A 11/17/2015    Procedure: LAMINECTOMY LUMBAR POSTERIOR MICROSCOPIC ONE LEVEL;  Surgeon: Stephen Vuong MD;  Location: [facility] OR     SURGICAL HISTORY OF -   09/30/2009    OPEN REDUCTION, INTERNAL FIXATINO OF RIGHT ANKLE (MEDIAL AND LATERL MALLEOLI)              Social History:   I have reviewed this patient's social history   Social History     Tobacco Use     Smoking status: Current Every Day Smoker     Packs/day: 0.25     Years: 30.00     Pack years: 7.50     Types: Cigarettes     Smokeless tobacco: Never Used   Substance Use Topics     Alcohol use: Yes             Family History:   I have reviewed this patient's family history   Family History   Problem Relation Age of Onset     C.A.D. Mother      C.A.D. Father      Other Cancer Brother         leukemia from agent orange      Diabetes Brother      Hypertension Brother              Immunizations:   Immunizations are current          Allergies:   No Known Allergies          Medications:   No current facility-administered medications on file prior to encounter.   albuterol (PROAIR HFA/PROVENTIL HFA/VENTOLIN HFA) 108 (90 Base) MCG/ACT inhaler, inhale 2 PUFFS EVERY 6 HOURS AS NEEDED SHORTNESS OF BREATH OR wheeze  allopurinol (ZYLOPRIM) 100 MG tablet, Take 1 tablet (100 mg) by mouth daily Due for lab only uric acid for further refill.  aspirin (ASA) 81 MG tablet, Take 81 mg by mouth daily  buPROPion (WELLBUTRIN SR) 150 MG 12 hr tablet, Take 1 tablet (150 mg) by mouth 2 times daily (Patient taking differently: Take 150 mg by mouth every morning )  CALCIUM 600 MG PO TABS, 1 TABLET TWICE DAILY WITH FOOD  fish oil-omega-3 fatty acids 1000 MG capsule, Take 1 g by mouth daily  fluticasone-salmeterol (AIRDUO RESPICLICK) 113-14 MCG/ACT inhaler, Inhale 1 puff into the lungs every 12 hours  lisinopril (ZESTRIL) 2.5 MG tablet, Take 1 tablet (2.5 mg) by mouth daily  metoprolol tartrate (LOPRESSOR) 50 MG tablet, Take 1 tablet (50 mg) by mouth daily  montelukast (SINGULAIR) 10 MG tablet, Take 1 tablet (10 mg) by mouth At Bedtime (Patient taking differently: Take 10 mg by mouth every morning Take 1 tablet (10 mg) by mouth At Bedtime)  MULTIVITAMINS PO TABS, ONE DAILY  predniSONE (DELTASONE) 20 MG tablet, Take 3 tabs by mouth daily x 3 days,  "then 2 tabs daily x 3 days, then 1 tab daily x 3 days, then 1/2 tab daily x 3 days.  order for DME, Equipment being ordered: RAH HOSE to knee 10-15 mmHg calf width 14 cm bilaterally (Patient not taking: Reported on 10/25/2019)  ORDER FOR DME, Equipment being ordered: Nebulizer               Review of Systems:    ROS: 10 point ROS neg other than the symptoms noted above in the HPI.           Physical Exam:   Blood pressure 119/76, pulse 81, temperature 98  F (36.7  C), temperature source Oral, resp. rate 18, height 1.626 m (5' 4\"), weight 61.2 kg (135 lb), SpO2 97 %, not currently breastfeeding.  Temperatures:  Current - Temp: 98  F (36.7  C); Max - Temp  Av  F (36.7  C)  Min: 98  F (36.7  C)  Max: 98  F (36.7  C)  Respiration range: Resp  Av  Min: 18  Max: 18  Pulse range: Pulse  Av.5  Min: 77  Max: 81  Blood pressure range: Systolic (24hrs), Av , Min:104 , Max:124   ; Diastolic (24hrs), Av, Min:72, Max:83    Pulse oximetry range: SpO2  Av.8 %  Min: 97 %  Max: 99 %  No intake or output data in the 24 hours ending 20  EXAM:  General: awake and alert, NAD, oriented x 3  Head: normocephalic  Neck: unremarkable, no lymphadenopathy   HEENT: oropharynx pink and moist    Heart: Regular rate and rhythm, no murmurs, rubs, or gallops  Lungs: clear to auscultation bilaterally with good air movement throughout  Abdomen: soft, tender in upper mid abdomen, no rebound or guarding, bowel sounds present, patient feels mildly bloated but no obvious abdominal distention, no masses or organomegaly, no rebound or guarding.    Extremities: no edema in lower extremities   Skin unremarkable.             Data:     Results for orders placed or performed during the hospital encounter of 20 (from the past 24 hour(s))   CBC with platelets differential   Result Value Ref Range    WBC 9.0 4.0 - 11.0 10e9/L    RBC Count 4.53 3.8 - 5.2 10e12/L    Hemoglobin 16.0 (H) 11.7 - 15.7 g/dL    Hematocrit 48.1 " (H) 35.0 - 47.0 %     (H) 78 - 100 fl    MCH 35.3 (H) 26.5 - 33.0 pg    MCHC 33.3 31.5 - 36.5 g/dL    RDW 12.3 10.0 - 15.0 %    Platelet Count 223 150 - 450 10e9/L    Diff Method Automated Method     % Neutrophils 64.2 %    % Lymphocytes 20.8 %    % Monocytes 12.7 %    % Eosinophils 1.7 %    % Basophils 0.2 %    % Immature Granulocytes 0.4 %    Nucleated RBCs 0 0 /100    Absolute Neutrophil 5.8 1.6 - 8.3 10e9/L    Absolute Lymphocytes 1.9 0.8 - 5.3 10e9/L    Absolute Monocytes 1.2 0.0 - 1.3 10e9/L    Absolute Eosinophils 0.2 0.0 - 0.7 10e9/L    Absolute Basophils 0.0 0.0 - 0.2 10e9/L    Abs Immature Granulocytes 0.0 0 - 0.4 10e9/L    Absolute Nucleated RBC 0.0    Comprehensive metabolic panel   Result Value Ref Range    Sodium 133 133 - 144 mmol/L    Potassium 4.1 3.4 - 5.3 mmol/L    Chloride 102 94 - 109 mmol/L    Carbon Dioxide 26 20 - 32 mmol/L    Anion Gap 5 3 - 14 mmol/L    Glucose 111 (H) 70 - 99 mg/dL    Urea Nitrogen 27 7 - 30 mg/dL    Creatinine 1.03 0.52 - 1.04 mg/dL    GFR Estimate 57 (L) >60 mL/min/[1.73_m2]    GFR Estimate If Black 66 >60 mL/min/[1.73_m2]    Calcium 10.0 8.5 - 10.1 mg/dL    Bilirubin Total 0.8 0.2 - 1.3 mg/dL    Albumin 4.0 3.4 - 5.0 g/dL    Protein Total 8.2 6.8 - 8.8 g/dL    Alkaline Phosphatase 102 40 - 150 U/L    ALT 67 (H) 0 - 50 U/L    AST 67 (H) 0 - 45 U/L   Lipase   Result Value Ref Range    Lipase 6,580 (H) 73 - 393 U/L   Alcohol ethyl   Result Value Ref Range    Ethanol g/dL <0.01 <0.01 g/dL           Attestation:  I have reviewed today's vital signs, notes, medications, labs and imaging.  Amount of time performed on this history and physical: 60 minutes.        Davion Mcguire MD

## 2020-09-01 NOTE — PROGRESS NOTES
"[facility] ADMISSION NOTE    Patient admitted to room 2309 at approximately 2230 via cart from emergency room. Patient was accompanied by transport tech.     Verbal SBAR report received from DEBORA [place] prior to patient arrival.     Patient ambulated to bed with stand-by assist. Patient alert and oriented X 3. Pain is controlled with current analgesics.  Medication(s) being used: prescription NSAID's including Tordal. 0-10 Pain Scale: 6. Admission vital signs: Blood pressure 118/67, pulse 89, temperature 98.3  F (36.8  C), temperature source Oral, resp. rate 16, height 1.626 m (5' 4\"), weight 61.7 kg (136 lb 0.4 oz), SpO2 97 %, not currently breastfeeding. Patient was oriented to plan of care, call light, bed controls, tv, telephone, bathroom and visiting hours.     Risk Assessment    The following safety risks were identified during admission: none. Yellow risk band applied: YES.     Skin Initial Assessment    This writer admitted this patient and completed a full skin assessment and Royal score in the Adult PCS flowsheet. Appropriate interventions initiated as needed.     Secondary skin check completed by Yris ROBB RN.    Royal Risk Assessment  Sensory Perception: 4-->no impairment  Moisture: 4-->rarely moist  Activity: 4-->walks frequently  Mobility: 4-->no limitation  Nutrition: 3-->adequate  Friction and Shear: 3-->no apparent problem  Royal Score: 22  Bed Support Surface: Atmos Air mattress  Reassessed using Bed Algorithm: No    Education    Patient has a [place] to Observation order: No  Observation education completed and documented: No    Ember May RN    "

## 2020-09-01 NOTE — PLAN OF CARE
CIWA scores 0 today. IV fluids infusing as ordered. Patient reported mild abdominal discomfort this morning stating she thought it was from hunger. Tolerated good amount of clear liquids for lunch without increase in pain or nausea. Voiding without difficulty. Up in room with stand by assist. Afebrile.

## 2020-09-01 NOTE — ED PROVIDER NOTES
"  HPI   The patient is a 65-year-old female presenting with epigastric pain \"that is similar to my prior episode of pancreatitis.\"  She has a known history of alcohol abuse.  She continues to drink on a daily basis.  She describes drinking heavily over the past 2 days.  Her pain came on this morning.  It has been waxing and waning throughout the day.  It has progressively worsened with time.  The pain is located in her epigastrium.  She feels bloated.  She denies radiating symptoms toward the mid back or chest.  She denies that food intake directly worsens her pain.  She has occasional nausea but no vomiting.  She denies new constipation or diarrhea.  She denies hematochezia or melena.  She denies dysuria, urgency, frequency, or hematuria.  She denies vaginal discharge or bleeding.  She denies recent trauma or injury.  Her current level of pain is mild.        Allergies:  No Known Allergies  Problem List:    Patient Active Problem List    Diagnosis Date Noted     Acute alcoholic pancreatitis 08/31/2020     Priority: Medium     CKD (chronic kidney disease) stage 3, GFR 30-59 ml/min (H) 08/10/2020     Priority: Medium     Encounter for smoking cessation counseling 10/25/2019     Priority: Medium     Seborrheic keratosis 10/25/2019     Priority: Medium     Atypical mole 10/25/2019     Priority: Medium     Healthcare maintenance 10/24/2019     Priority: Medium     Varicose veins of both lower extremities 10/17/2017     Priority: Medium     Chronic rhinitis 06/15/2016     Priority: Medium     Chronic gout of multiple sites, unspecified cause 06/15/2016     Priority: Medium     Tobacco use disorder 06/15/2016     Priority: Medium     Spondylolisthesis of lumbar region 09/03/2015     Priority: Medium     Lumbar foraminal stenosis 09/03/2015     Priority: Medium     Alcohol abuse, daily use 05/18/2013     Priority: Medium     HTN (hypertension) 05/18/2013     Priority: Medium     Alcohol use 11/16/2012     Priority: Medium "     Chronic low back pain 11/16/2012     Priority: Medium     Pt referred to pain clinic.   Has been asking for pain meds for pain at different sites over the last year. Needs to see pain specialist.  Also, of note, drinks alcohol almost on a daily basis.   Poor compliance with appointments.       GERD (gastroesophageal reflux disease) 04/20/2011     Priority: Medium     Major depressive disorder, recurrent episode, mild (HCC) 04/20/2011     Priority: Medium     Intermittent asthma 01/13/2011     Priority: Medium     Benign essential hypertension 12/21/2010     Priority: Medium     CARDIOVASCULAR SCREENING; LDL GOAL LESS THAN 130 10/31/2010     Priority: Medium      Past Medical History:    No past medical history on file.  Past Surgical History:    Past Surgical History:   Procedure Laterality Date     LAMINECTOMY LUMBAR POSTERIOR MICROSCOPIC ONE LEVEL N/A 11/17/2015    Procedure: LAMINECTOMY LUMBAR POSTERIOR MICROSCOPIC ONE LEVEL;  Surgeon: Stephen Vuong MD;  Location: [facility]     SURGICAL HISTORY OF -   09/30/2009    OPEN REDUCTION, INTERNAL FIXATINO OF RIGHT ANKLE (MEDIAL AND LATERL MALLEOLI)     Family History:    Family History   Problem Relation Age of Onset     C.A.D. Mother      C.A.D. Father      Other Cancer Brother         leukemia from agent orange      Diabetes Brother      Hypertension Brother      Social History:  Marital Status:   [2]  Social History     Tobacco Use     Smoking status: Current Every Day Smoker     Packs/day: 0.25     Years: 30.00     Pack years: 7.50     Types: Cigarettes     Smokeless tobacco: Never Used   Substance Use Topics     Alcohol use: Yes     Drug use: No      Medications:    albuterol (PROAIR HFA/PROVENTIL HFA/VENTOLIN HFA) 108 (90 Base) MCG/ACT inhaler  allopurinol (ZYLOPRIM) 100 MG tablet  aspirin (ASA) 81 MG tablet  buPROPion (WELLBUTRIN SR) 150 MG 12 hr tablet  CALCIUM 600 MG PO TABS  fish oil-omega-3 fatty acids 1000 MG capsule  fluticasone-salmeterol  "(AIRDUO RESPICLICK) 113-14 MCG/ACT inhaler  lisinopril (ZESTRIL) 2.5 MG tablet  metoprolol tartrate (LOPRESSOR) 50 MG tablet  montelukast (SINGULAIR) 10 MG tablet  MULTIVITAMINS PO TABS  predniSONE (DELTASONE) 20 MG tablet  order for DME  ORDER FOR DME      Review of Systems   All other systems reviewed and are negative.      PE   BP: 106/72  Pulse: 80  Temp: 98  F (36.7  C)  Resp: 18  Height: 162.6 cm (5' 4\")  Weight: 61.2 kg (135 lb)  SpO2: 99 %  Physical Exam  Vitals signs reviewed.   Constitutional:       General: She is not in acute distress.     Appearance: She is well-developed.   HENT:      Head: Normocephalic and atraumatic.      Right Ear: External ear normal.      Left Ear: External ear normal.      Nose: Nose normal.      Mouth/Throat:      Mouth: Mucous membranes are moist.      Pharynx: Oropharynx is clear.   Eyes:      Extraocular Movements: Extraocular movements intact.      Conjunctiva/sclera: Conjunctivae normal.      Pupils: Pupils are equal, round, and reactive to light.   Neck:      Musculoskeletal: Normal range of motion.   Cardiovascular:      Rate and Rhythm: Normal rate and regular rhythm.   Pulmonary:      Effort: Pulmonary effort is normal.   Abdominal:      Comments: Her abdomen is quite tender in the epigastrium.  She has voluntary guarding when I push deeply.  Mild distention.  No organomegaly or mass.   Musculoskeletal: Normal range of motion.   Skin:     General: Skin is warm and dry.   Neurological:      Mental Status: She is alert and oriented to person, place, and time.   Psychiatric:         Behavior: Behavior normal.         ED COURSE and [facility]   1941.  Patient presents with epigastric pain that is similar to her prior episodes of pancreatitis.  Her last episode of pancreatitis was 15 years ago.  She does admit to daily drinking, more recently.  She has occasional nausea.  She denies other symptoms, as above.  Lab values pending.  She refuses any medication for symptoms.  IV " lock.    2044.  The patient's pain is more severe.  She is requesting medication for analgesia.  Toradol 15 mg IV will be given first.  Dilaudid will be given if the pain is not controlled.  She has evidence of pancreatitis with an elevated lipase.  The patient is agreeable to admission.  Awaiting callback from hospitalist.    2129.  Pt is accepted by Dr. Mcguire.  No additional orders requested.    LABS  Labs Ordered and Resulted from Time of ED Arrival Up to the Time of Departure from the ED   CBC WITH PLATELETS DIFFERENTIAL - Abnormal; Notable for the following components:       Result Value    Hemoglobin 16.0 (*)     Hematocrit 48.1 (*)      (*)     MCH 35.3 (*)     All other components within normal limits   COMPREHENSIVE METABOLIC PANEL - Abnormal; Notable for the following components:    Glucose 111 (*)     GFR Estimate 57 (*)     ALT 67 (*)     AST 67 (*)     All other components within normal limits   LIPASE - Abnormal; Notable for the following components:    Lipase 6,580 (*)     All other components within normal limits   ALCOHOL ETHYL   COVID-19 VIRUS (CORONAVIRUS) BY PCR       IMAGING  Images reviewed by me.  Radiology report also reviewed.  No orders to display       Procedures    Medications   lactated ringers BOLUS 1,000 mL (1,000 mLs Intravenous New Bag 8/31/20 2115)   ketorolac (TORADOL) injection 15 mg (15 mg Intravenous Given 8/31/20 2047)         IMPRESSION       ICD-10-CM    1. Alcohol-induced acute pancreatitis, unspecified complication status  K85.20             Medication List      There are no discharge medications for this visit.                          Ronald Weston MD  09/14/20 05

## 2020-09-02 VITALS
DIASTOLIC BLOOD PRESSURE: 80 MMHG | TEMPERATURE: 98.3 F | SYSTOLIC BLOOD PRESSURE: 140 MMHG | HEIGHT: 64 IN | WEIGHT: 136.47 LBS | BODY MASS INDEX: 23.3 KG/M2 | HEART RATE: 66 BPM | RESPIRATION RATE: 18 BRPM | OXYGEN SATURATION: 97 %

## 2020-09-02 LAB
SARS-COV-2 RNA SPEC QL NAA+PROBE: NOT DETECTED
SPECIMEN SOURCE: NORMAL

## 2020-09-02 PROCEDURE — 99239 HOSP IP/OBS DSCHRG MGMT >30: CPT | Performed by: INTERNAL MEDICINE

## 2020-09-02 PROCEDURE — 25000131 ZZH RX MED GY IP 250 OP 636 PS 637: Performed by: FAMILY MEDICINE

## 2020-09-02 PROCEDURE — 25800030 ZZH RX IP 258 OP 636: Performed by: INTERNAL MEDICINE

## 2020-09-02 PROCEDURE — 25000132 ZZH RX MED GY IP 250 OP 250 PS 637: Performed by: FAMILY MEDICINE

## 2020-09-02 PROCEDURE — 25000132 ZZH RX MED GY IP 250 OP 250 PS 637: Performed by: INTERNAL MEDICINE

## 2020-09-02 RX ADMIN — MULTIPLE VITAMINS W/ MINERALS TAB 1 TABLET: TAB at 07:42

## 2020-09-02 RX ADMIN — CALCIUM 500 MG: 500 TABLET ORAL at 07:42

## 2020-09-02 RX ADMIN — PREDNISONE 10 MG: 10 TABLET ORAL at 07:42

## 2020-09-02 RX ADMIN — MONTELUKAST 10 MG: 10 TABLET, FILM COATED ORAL at 07:43

## 2020-09-02 RX ADMIN — SODIUM CHLORIDE, POTASSIUM CHLORIDE, SODIUM LACTATE AND CALCIUM CHLORIDE: 600; 310; 30; 20 INJECTION, SOLUTION INTRAVENOUS at 04:22

## 2020-09-02 RX ADMIN — BUPROPION HYDROCHLORIDE 150 MG: 150 TABLET, FILM COATED, EXTENDED RELEASE ORAL at 07:46

## 2020-09-02 RX ADMIN — ALLOPURINOL 100 MG: 100 TABLET ORAL at 07:43

## 2020-09-02 RX ADMIN — LISINOPRIL 2.5 MG: 2.5 TABLET ORAL at 07:42

## 2020-09-02 RX ADMIN — ALBUTEROL SULFATE 1 PUFF: 90 AEROSOL, METERED RESPIRATORY (INHALATION) at 07:43

## 2020-09-02 RX ADMIN — METOPROLOL TARTRATE 50 MG: 25 TABLET, FILM COATED ORAL at 07:42

## 2020-09-02 RX ADMIN — ASPIRIN 81 MG 81 MG: 81 TABLET ORAL at 07:42

## 2020-09-02 RX ADMIN — THIAMINE HCL TAB 100 MG 100 MG: 100 TAB at 07:42

## 2020-09-02 RX ADMIN — FOLIC ACID 1 MG: 1 TABLET ORAL at 07:43

## 2020-09-02 RX ADMIN — FAMOTIDINE 10 MG: 10 TABLET, FILM COATED ORAL at 07:46

## 2020-09-02 RX ADMIN — FLUTICASONE FUROATE AND VILANTEROL TRIFENATATE 1 PUFF: 100; 25 POWDER RESPIRATORY (INHALATION) at 07:43

## 2020-09-02 ASSESSMENT — ACTIVITIES OF DAILY LIVING (ADL)
ADLS_ACUITY_SCORE: 11

## 2020-09-02 ASSESSMENT — MIFFLIN-ST. JEOR: SCORE: 1154

## 2020-09-02 NOTE — PLAN OF CARE
"Nursing Assessment:   Patient is alert and oriented x4.  Denies pain.  Up ind in room.   Vss on room air.  CIWAs : 0  Eating and drinking adequately.   Anticipate discharge today.     Vital signs:  Temp: 98.3  F (36.8  C) Temp src: Oral BP: (!) 140/80 Pulse: 66   Resp: 18 SpO2: 97 % O2 Device: None (Room air)   Height: 162.6 cm (5' 4\") Weight: 61.9 kg (136 lb 7.4 oz)  Estimated body mass index is 23.42 kg/m  as calculated from the following:    Height as of this encounter: 1.626 m (5' 4\").    Weight as of this encounter: 61.9 kg (136 lb 7.4 oz).     Prema Szymanski RN on 9/2/2020 at 8:59 AM    "

## 2020-09-02 NOTE — PLAN OF CARE
[facility] DISCHARGE NOTE    Patient discharged to home at 10:17 AM via ambulation. Accompanied by staff. Discharge instructions reviewed with patient, opportunity offered to ask questions. Prescriptions - None ordered for discharge. All belongings sent with patient.    Prema Szymanski RN

## 2020-09-02 NOTE — PROGRESS NOTES
SUBJECTIVE   Fabi Rizzo is a  female who presents to clinic today for the following health issue(s):         Hospital Follow-up Visit:    Hospital/Nursing Home/IP Rehab Facility: [facility]  Date of Admission: 08/31/2020  Date of Discharge: 09/02/2020  Reason(s) for Admission:   Acute alcoholic pancreatitis  Daily alcohol use/abuse  Mildly elevated liver function tests  Recent hives  Benign essential hypertension  Intermittent asthma   GERD (gastroesophageal reflux disease)   Major depressive disorder, recurrent episode, mild (HCC)  Chronic low back pain  Tobacco use disorder  CKD (chronic kidney disease) stage 3, GFR 30-59 ml/min (H)  COVID-19 negative.        Was your hospitalization related to COVID-19? No   Problems taking medications regularly:  None  Medication changes since discharge: None  Problems adhering to non-medication therapy:  None    Summary of hospitalization:  [facility] discharge summary reviewed  Diagnostic Tests/Treatments reviewed.  Follow up needed: none  Other Healthcare Providers Involved in Patient s Care:         None  Update since discharge: improved. Post Discharge Medication Reconciliation: discharge medications reconciled, continue medications without change.  Plan of care communicated with patient            Health Maintenance        Health Maintenance Due   Topic Date Due     COPD ACTION PLAN  1955     ZOSTER IMMUNIZATION (1 of 2) 02/15/2006     PREVENTIVE CARE VISIT  06/15/2017     MAMMO SCREENING  02/26/2020     INFLUENZA VACCINE (1) 09/01/2020       PCP   Terri Hill, [facility] [phone]    PROBLEM LIST        Patient Active Problem List   Diagnosis     CARDIOVASCULAR SCREENING; LDL GOAL LESS THAN 130     Benign essential hypertension     GERD (gastroesophageal reflux disease)     Major depressive disorder, recurrent episode, mild (HCC)     Alcohol use     Chronic low back pain     Spondylolisthesis of lumbar region     Lumbar foraminal  "stenosis     Chronic rhinitis     Chronic gout of multiple sites, unspecified cause     Tobacco use disorder     Varicose veins of both lower extremities     Healthcare maintenance     CKD (chronic kidney disease) stage 3, GFR 30-59 ml/min (H)     H/O Alcohol-induced Pancreatitis      COPD (chronic obstructive pulmonary disease) (H)- mild       MEDICATIONS        Current Outpatient Medications   Medication     albuterol (PROAIR HFA/PROVENTIL HFA/VENTOLIN HFA) 108 (90 Base) MCG/ACT inhaler     allopurinol (ZYLOPRIM) 100 MG tablet     aspirin (ASA) 81 MG tablet     buPROPion (WELLBUTRIN SR) 150 MG 12 hr tablet     CALCIUM 600 MG PO TABS     fish oil-omega-3 fatty acids 1000 MG capsule     fluticasone-salmeterol (AIRDUO RESPICLICK) 113-14 MCG/ACT inhaler     lisinopril (ZESTRIL) 2.5 MG tablet     metoprolol tartrate (LOPRESSOR) 50 MG tablet     montelukast (SINGULAIR) 10 MG tablet     MULTIVITAMINS PO TABS     ORDER FOR DME     No current facility-administered medications for this visit.        Reviewed and updated as needed this visit by Provider:  Tobacco  Allergies  Meds  Med Hx  Surg Hx  Fam Hx  Soc Hx     ROS      Constitutional, neuro, ENT, endocrine, pulmonary, cardiac, gastrointestinal, genitourinary, musculoskeletal, integument and psychiatric systems are negative, except as otherwise noted.    PHYSICAL EXAM   /66   Pulse 67   Temp 97.7  F (36.5  C) (Tympanic)   Resp 16   Ht 1.626 m (5' 4\")   Wt 60.8 kg (134 lb)   SpO2 97%   BMI 23.00 kg/m    Body mass index is 23 kg/m .  GENERAL APPEARANCE: healthy, alert and no distress  RESP: lungs clear to auscultation - no rales, rhonchi or wheezes  CV: regular rates and rhythm, normal S1 S2, no S3 or S4 and no murmur, click or rub  ABDOMEN: soft, nontender, without hepatosplenomegaly or masses and bowel sounds normal  MS: extremities normal- no gross deformities noted  SKIN: no suspicious lesions or rashes  PSYCH: mentation appears normal and affect " normal/bright    ASSESSMENT & PLAN     1. Hospital discharge follow-up    2. Alcohol-induced acute pancreatitis without infection or necrosis  Acute, stable    3. Alcohol use  Chronic, stable  Reduce daily consumption    Get FLU SHOT in October        Risks, benefits, side effects and rationale for treatment plan fully discussed with the patient and understanding expressed.  Terri Hill, MANASP-BC  [facility]

## 2020-09-02 NOTE — DISCHARGE SUMMARY
[facility]  Hospitalist Discharge Summary       Date of Admission:  8/31/2020  Date of Discharge:  9/2/2020 10:17 AM  Discharging Provider: Mahendra Gurrola MD      Discharge Diagnoses     Acute alcoholic pancreatitis  Daily alcohol use/abuse  Mildly elevated liver function tests  Recent hives  Benign essential hypertension  Intermittent asthma   GERD (gastroesophageal reflux disease)   Major depressive disorder, recurrent episode, mild (HCC)  Chronic low back pain  Tobacco use disorder  CKD (chronic kidney disease) stage 3, GFR 30-59 ml/min (H)  COVID-19 negative.       Follow-ups Needed After Discharge   Follow-up Appointments     Follow-up and recommended labs and tests      Follow up with primary care provider, Terri Hill, within 7   days for hospital follow- up.  No follow up labs or test are needed.           Discharge Disposition   Discharged to home  Condition at discharge: Stable    Hospital Course   Fabi Rizzo is a 65 year old female who was admitted on 8/31/2020 with abdominal pain, found to have pancreatitis.    Acute alcoholic pancreatitis  Daily alcohol user as below, suspect flare due to increased use in the 48 hours prior to admission.  Identical to prior episodes but not quite as bad.  No red flags, last CT in 2013 showed no pseudocysts or abscess.  Agree with ER provider that this seems pretty clear-cut and that we can hold off on CT for now.  If worsening or not improving or if bili/alk phos/liver enyzmes are worsening then would proceed with CT abdomen or US.   Getting first 1L LR in ER, will give a 2nd liter bolus then 250/h.  Reassess tomorrow.  Dilaudid prn for pain with oxycodone ordered - hope to wean off dilaudid.  - Abdominal pain improved, LFTs normalized  - Started clear liquids, advanced to regular diet, tolerated without difficulty  - Opiates weaned off  - Stressed need to refrain from alcohol excess      Daily alcohol use/abuse  2 drinks  daily, occasionally more.  Recommend quitting in the short term, and at least cutting back long-term given pancreatitis.  No apparent withdrawal at present, but started on CIWA with IV followed by oral vitamins, follow.  - No evidence of alcohol withdrawal       Mildly elevated liver function tests  Suspect due to alcohol.  Follow.      - Normalized     Recent hives  Diagnosed via recent virtual visit and started on prednisone taper which is almost complete, will finish last 1 days of 10 mg (ordered), rash resolved.         Benign essential hypertension  Blood pressure stable, continue home metoprolol and lisinopril.       Intermittent asthma  At baseline - continue home fluticasone/salmeterol, singulair and albuterol prn     GERD (gastroesophageal reflux disease)  Doing well, ordered famotidine IV for now given NPO status and pancreatitis, no scheduled medications at home.       Major depressive disorder, recurrent episode, mild (HCC)  Mood and affect stable, no suicidal ideation - continue home wellbutrin      Chronic low back pain  No complaints of this today.       Tobacco use disorder  3 cigs per day, does not want replacement or help quitting at present.        CKD (chronic kidney disease) stage 3, GFR 30-59 ml/min (H)  Stable.      Asymptomatic COVID-19 screen negative.       Consultations This Hospital Stay   None    Code Status   Full Code    Time Spent on this Encounter   I, Mahendra Gurrola MD, personally saw the patient today and spent greater than 30 minutes discharging this patient.       Mahendra Gurrola MD  [facility]  ______________________________________________________________________    Physical Exam   Vital Signs: Temp: 98.3  F (36.8  C) Temp src: Oral BP: (!) 140/80 Pulse: 66   Resp: 18 SpO2: 97 % O2 Device: None (Room air)    Weight: 136 lbs 7.44 oz       Gen: Well nourished, well developed, alert and oriented x 3, no acute distressed  HEENT: Atraumatic,  normocephalic; sclera non-injected, anicterric; oral mucosa moist, no lesion, no exudate  Lungs: Clear to ausculation, no wheezes, no rhonchi, no rales  Heart: Regular rate, regular rhythm, no gallops, no rubs, no murmurs  GI: Bowel sound normal, no hepatosplenomegaly, no masses, non-tender, non-distended, no guarding, no rebound tenderness  Lymph: No lymphadenopathy, no edema  Skin: No rashes, no chronic venous stasis     Primary Care Physician   Terri Hill    Discharge Orders      Follow-up and recommended labs and tests    Follow up with primary care provider, Terri Hill, within 7 days for hospital follow- up.  No follow up labs or test are needed.     Activity    Your activity upon discharge: activity as tolerated     Reason for your hospital stay    This is a 65 year old female admitted with pancreatitis.     Full Code     Diet    Follow this diet upon discharge: Orders Placed This Encounter      Regular Diet Adult       Significant Results and Procedures   Most Recent 3 CBC's:  Recent Labs   Lab Test 09/01/20  0527 08/31/20  1929 06/15/16  1030   WBC 7.1 9.0 4.7   HGB 12.2 16.0* 14.6   * 106* 105*    223 236     Most Recent 3 BMP's:  Recent Labs   Lab Test 09/01/20 0527 08/31/20 1929 07/31/20  1428    133 137   POTASSIUM 4.0 4.1 4.5   CHLORIDE 107 102 105   CO2 26 26 27   BUN 22 27 20   CR 0.83 1.03 1.56*   ANIONGAP 5 5 5   QING 8.4* 10.0 10.4*   GLC 82 111* 101*     Most Recent 2 LFT's:  Recent Labs   Lab Test 09/01/20 0527 08/31/20 1929   AST 41 67*   ALT 41 67*   ALKPHOS 70 102   BILITOTAL 0.8 0.8   ,   Results for orders placed or performed in visit on 02/26/18   *MA Screening Digital Bilateral    Narrative    SCREENING MAMMOGRAM, BILATERAL, DIGITAL w/CAD - 2/26/2018 11:31 AM.    BREAST SYMPTOMS: No current breast complaints.     COMPARISON:  11/21/2008.    BREAST DENSITY: Scattered fibroglandular densities.    COMMENTS: No findings of suspicion for malignancy.        Impression    IMPRESSION: BI-RADS CATEGORY: 1 - Negative.    RECOMMENDED FOLLOW-UP: Annual Mammography.  Recommend routine annual screening mammography.    Exam results letter mailed to patient.                ARABELLA AVALOS MD       Discharge Medications   Current Discharge Medication List      CONTINUE these medications which have NOT CHANGED    Details   albuterol (PROAIR HFA/PROVENTIL HFA/VENTOLIN HFA) 108 (90 Base) MCG/ACT inhaler inhale 2 PUFFS EVERY 6 HOURS AS NEEDED SHORTNESS OF BREATH OR wheeze  Qty: 18 g, Refills: 0    Comments: This prescription was filled on 5/18/2020. Any refills authorized will be placed on file.  Associated Diagnoses: Chronic non-seasonal allergic rhinitis      allopurinol (ZYLOPRIM) 100 MG tablet Take 1 tablet (100 mg) by mouth daily Due for lab only uric acid for further refill.  Qty: 90 tablet, Refills: 0    Associated Diagnoses: Chronic gout of multiple sites, unspecified cause      aspirin (ASA) 81 MG tablet Take 81 mg by mouth daily      buPROPion (WELLBUTRIN SR) 150 MG 12 hr tablet Take 1 tablet (150 mg) by mouth 2 times daily  Qty: 180 tablet, Refills: 5    Associated Diagnoses: Major depressive disorder, recurrent episode, mild (H)      CALCIUM 600 MG PO TABS 1 TABLET TWICE DAILY WITH FOOD  Qty: 60 Tab, Refills: 3      fish oil-omega-3 fatty acids 1000 MG capsule Take 1 g by mouth daily      fluticasone-salmeterol (AIRDUO RESPICLICK) 113-14 MCG/ACT inhaler Inhale 1 puff into the lungs every 12 hours  Qty: 3 Inhaler, Refills: 3    Comments: This prescription was filled on 8/3/2020. Any refills authorized will be placed on file.  Associated Diagnoses: Mild intermittent asthma without complication      lisinopril (ZESTRIL) 2.5 MG tablet Take 1 tablet (2.5 mg) by mouth daily  Qty: 90 tablet, Refills: 0    Associated Diagnoses: Benign essential hypertension      metoprolol tartrate (LOPRESSOR) 50 MG tablet Take 1 tablet (50 mg) by mouth daily  Qty: 90 tablet, Refills: 3     Associated Diagnoses: Benign essential hypertension      montelukast (SINGULAIR) 10 MG tablet Take 1 tablet (10 mg) by mouth At Bedtime  Qty: 90 tablet, Refills: 3    Associated Diagnoses: Chronic non-seasonal allergic rhinitis; Mild intermittent asthma without complication      MULTIVITAMINS PO TABS ONE DAILY  Qty: 100 Tab, Refills: 3      predniSONE (DELTASONE) 20 MG tablet Take 3 tabs by mouth daily x 3 days, then 2 tabs daily x 3 days, then 1 tab daily x 3 days, then 1/2 tab daily x 3 days.  Qty: 20 tablet, Refills: 0    Associated Diagnoses: Hives      !! order for DME Equipment being ordered: RAH HOSE to knee 10-15 mmHg calf width 14 cm bilaterally  Qty: 2 Units, Refills: 0    Associated Diagnoses: Varicose veins of both lower extremities      !! ORDER FOR DME Equipment being ordered: Nebulizer  Qty: 1 Device, Refills: 0    Associated Diagnoses: Chest wall pain; Cough       !! - Potential duplicate medications found. Please discuss with provider.        Allergies   No Known Allergies

## 2020-09-02 NOTE — PLAN OF CARE
Pt has had a quiet night.  Was medicated for back pain earlier and was able to fall asleep.  Pt up independently in her room and can let needs be known.  KIM Marie RN

## 2020-09-03 ENCOUNTER — TELEPHONE (OUTPATIENT)
Dept: FAMILY MEDICINE | Facility: CLINIC | Age: 65
End: 2020-09-03

## 2020-09-03 NOTE — TELEPHONE ENCOUNTER
ED / Discharge Outreach Protocol    Patient Contact    Attempt # 1    Was call answered?  No.  Left message on voicemail with information to call me back.    Hosp F/U appt scheduled 09-09-20.  FRANCHESCA Hicks RN

## 2020-09-03 NOTE — TELEPHONE ENCOUNTER
ED/UC/IP follow up phone call:    RN please call to follow up.    Number of ED visits in past 12 months = 1  Luz Maria [facility]

## 2020-09-04 NOTE — TELEPHONE ENCOUNTER
ED / Discharge Outreach Protocol    Patient Contact    Attempt # 2    Was call answered?  No.  Left message on voicemail with information to call me back.  FRANCHESCA Hicks RN

## 2020-09-09 ENCOUNTER — OFFICE VISIT (OUTPATIENT)
Dept: FAMILY MEDICINE | Facility: CLINIC | Age: 65
End: 2020-09-09
Payer: COMMERCIAL

## 2020-09-09 VITALS
TEMPERATURE: 97.7 F | DIASTOLIC BLOOD PRESSURE: 66 MMHG | OXYGEN SATURATION: 97 % | WEIGHT: 134 LBS | HEIGHT: 64 IN | RESPIRATION RATE: 16 BRPM | BODY MASS INDEX: 22.88 KG/M2 | HEART RATE: 67 BPM | SYSTOLIC BLOOD PRESSURE: 124 MMHG

## 2020-09-09 DIAGNOSIS — Z78.9 ALCOHOL USE: Chronic | ICD-10-CM

## 2020-09-09 DIAGNOSIS — K85.20 ALCOHOL-INDUCED ACUTE PANCREATITIS WITHOUT INFECTION OR NECROSIS: ICD-10-CM

## 2020-09-09 DIAGNOSIS — Z09 HOSPITAL DISCHARGE FOLLOW-UP: Primary | ICD-10-CM

## 2020-09-09 PROBLEM — N18.30 CKD (CHRONIC KIDNEY DISEASE) STAGE 3, GFR 30-59 ML/MIN (H): Chronic | Status: ACTIVE | Noted: 2020-08-10

## 2020-09-09 PROBLEM — K85.90 ACUTE PANCREATITIS: Status: RESOLVED | Noted: 2020-08-31 | Resolved: 2020-09-09

## 2020-09-09 PROBLEM — L82.1 SEBORRHEIC KERATOSIS: Status: RESOLVED | Noted: 2019-10-25 | Resolved: 2020-09-09

## 2020-09-09 PROBLEM — D22.9 ATYPICAL MOLE: Status: RESOLVED | Noted: 2019-10-25 | Resolved: 2020-09-09

## 2020-09-09 PROBLEM — Z00.00 HEALTHCARE MAINTENANCE: Status: ACTIVE | Noted: 2019-10-24

## 2020-09-09 PROBLEM — Z71.6 ENCOUNTER FOR SMOKING CESSATION COUNSELING: Status: RESOLVED | Noted: 2019-10-25 | Resolved: 2020-09-09

## 2020-09-09 PROCEDURE — 99213 OFFICE O/P EST LOW 20 MIN: CPT | Performed by: NURSE PRACTITIONER

## 2020-09-09 ASSESSMENT — MIFFLIN-ST. JEOR: SCORE: 1142.82

## 2020-09-09 NOTE — PATIENT INSTRUCTIONS
1. Hospital discharge follow-up    2. Alcohol-induced acute pancreatitis without infection or necrosis  Acute, stable    3. Alcohol use  Chronic, stable  Reduce daily consumption    Get FLU SHOT in October

## 2020-09-09 NOTE — NURSING NOTE
"Chief Complaint   Patient presents with     Hospital F/U       Initial /66   Pulse 67   Temp 97.7  F (36.5  C) (Tympanic)   Resp 16   Ht 1.626 m (5' 4\")   Wt 60.8 kg (134 lb)   SpO2 97%   BMI 23.00 kg/m   Estimated body mass index is 23 kg/m  as calculated from the following:    Height as of this encounter: 1.626 m (5' 4\").    Weight as of this encounter: 60.8 kg (134 lb).    Patient presents to the clinic using No DME    Health Maintenance that is potentially due pending provider review:  Mammogram    Pt will schedule mammogram appt.    Is there anyone who you would like to be able to receive your results? No  If yes have patient fill out WILLIAM    "

## 2020-10-19 DIAGNOSIS — J30.89 CHRONIC NON-SEASONAL ALLERGIC RHINITIS: ICD-10-CM

## 2020-10-19 DIAGNOSIS — J45.20 MILD INTERMITTENT ASTHMA WITHOUT COMPLICATION: Chronic | ICD-10-CM

## 2020-10-20 RX ORDER — MONTELUKAST SODIUM 10 MG/1
TABLET ORAL
Qty: 90 TABLET | Refills: 3 | Status: SHIPPED | OUTPATIENT
Start: 2020-10-20

## 2020-10-20 NOTE — TELEPHONE ENCOUNTER
"Requested Prescriptions   Pending Prescriptions Disp Refills     montelukast (SINGULAIR) 10 MG tablet [Pharmacy Med Name: montelukast 10 mg tablet] 90 tablet 3     Sig: Take 1 tablet (10 mg) by mouth At Bedtime       Leukotriene Inhibitors Protocol Failed - 10/19/2020  8:01 AM        Failed - Asthma control assessment score within normal limits in last 6 months     Please review ACT score.           Passed - Patient is age 12 or older     If patient is under 16, ok to refill using age based dosing.           Passed - Medication is active on med list        Passed - Recent (6 mo) or future (30 days) visit within the authorizing provider's specialty     Patient had office visit in the last 6 months or has a visit in the next 30 days with authorizing provider or within the authorizing provider's specialty.  See \"Patient Info\" tab in inbasket, or \"Choose Columns\" in Meds & Orders section of the refill encounter.                 "

## 2020-10-30 DIAGNOSIS — I10 BENIGN ESSENTIAL HYPERTENSION: ICD-10-CM

## 2020-10-30 DIAGNOSIS — M1A.09X0 CHRONIC GOUT OF MULTIPLE SITES, UNSPECIFIED CAUSE: ICD-10-CM

## 2020-10-30 RX ORDER — ALLOPURINOL 100 MG/1
100 TABLET ORAL DAILY
Qty: 90 TABLET | Refills: 2 | Status: SHIPPED | OUTPATIENT
Start: 2020-10-30

## 2020-10-30 RX ORDER — LISINOPRIL 2.5 MG/1
TABLET ORAL
Qty: 90 TABLET | Refills: 2 | Status: SHIPPED | OUTPATIENT
Start: 2020-10-30

## 2020-10-30 NOTE — TELEPHONE ENCOUNTER
"Prescriptions approved per [facility] Refill Protocol.    Requested Prescriptions   Pending Prescriptions Disp Refills     lisinopril (ZESTRIL) 2.5 MG tablet [Pharmacy Med Name: lisinopril 2.5 mg tablet] 90 tablet 0     Sig: Take 1 tablet (2.5 mg) by mouth daily       ACE Inhibitors (Including Combos) Protocol Passed - 10/30/2020  8:58 AM        Passed - Blood pressure under 140/90 in past 12 months     BP Readings from Last 3 Encounters:   09/09/20 124/66   09/02/20 (!) 140/80   08/10/20 135/70                 Passed - Recent (12 mo) or future (30 days) visit within the authorizing provider's specialty     Patient has had an office visit with the authorizing provider or a provider within the authorizing providers department within the previous 12 mos or has a future within next 30 days. See \"Patient Info\" tab in inbasket, or \"Choose Columns\" in Meds & Orders section of the refill encounter.              Passed - Medication is active on med list        Passed - Patient is age 18 or older        Passed - No active pregnancy on record        Passed - Normal serum creatinine on file in past 12 months     Recent Labs   Lab Test 09/01/20 0527   CR 0.83       Ok to refill medication if creatinine is low          Passed - Normal serum potassium on file in past 12 months     Recent Labs   Lab Test 09/01/20 0527   POTASSIUM 4.0             Passed - No positive pregnancy test within past 12 months           allopurinol (ZYLOPRIM) 100 MG tablet [Pharmacy Med Name: allopurinol 100 mg tablet] 90 tablet 0     Sig: Take 1 tablet (100 mg) by mouth daily Due for lab only uric acid for further refill.       Gout Agents Protocol Passed - 10/30/2020  8:58 AM        Passed - CBC on file in past 12 months     Recent Labs   Lab Test 09/01/20 0527   WBC 7.1   RBC 3.48*   HGB 12.2   HCT 36.9                    Passed - ALT on file in past 12 months     Recent Labs   Lab Test 09/01/20 0527   ALT 41             Passed - Has Uric Acid on " "file in past 12 months and value is less than 6     Recent Labs   Lab Test 07/31/20  1428   URIC 4.9     If level is 6mg/dL or greater, ok to refill one time and refer to provider.           Passed - Recent (12 mo) or future (30 days) visit within the authorizing provider's specialty     Patient has had an office visit with the authorizing provider or a provider within the authorizing providers department within the previous 12 mos or has a future within next 30 days. See \"Patient Info\" tab in inbasket, or \"Choose Columns\" in Meds & Orders section of the refill encounter.              Passed - Medication is active on med list        Passed - Patient is age 18 or older        Passed - No active pregnancy on record        Passed - Normal serum creatinine on file in the past 12 months     Recent Labs   Lab Test 09/01/20  0527   CR 0.83       Ok to refill medication if creatinine is low          Passed - No positive pregnancy test in past year           Arelis FERNANDEZ RN, BSN        "

## 2020-12-09 DIAGNOSIS — J30.89 CHRONIC NON-SEASONAL ALLERGIC RHINITIS: ICD-10-CM

## 2020-12-09 NOTE — TELEPHONE ENCOUNTER
Routing to covering provider to advise refill as Hanane Hdadad not providers at [place].    Routing refill request to provider for review/approval because:  Patient needs to be seen because:  Overdue for medication review. Patient is using for Chronic non seasonal allergic rhinitis. Liudmila refill given.    DEBORA Howard

## 2020-12-10 RX ORDER — ALBUTEROL SULFATE 90 UG/1
AEROSOL, METERED RESPIRATORY (INHALATION)
Qty: 18 G | Refills: 0 | Status: SHIPPED | OUTPATIENT
Start: 2020-12-10 | End: 2021-02-22

## 2020-12-10 NOTE — TELEPHONE ENCOUNTER
Pt was called, she will transfer her care to [facility]. She will think about who she wants to establish care with - likely Dr. Avila as she has seen him before.  ACT updated today. She says she uses her albuterol inhaler once every morning.    ACT Total Scores 10/25/2019 7/20/2020 12/10/2020   ACT TOTAL SCORE - - -   ASTHMA ER VISITS - - -   ASTHMA HOSPITALIZATIONS - - -   ACT TOTAL SCORE (Goal Greater than or Equal to 20) 20 19 20   In the past 12 months, how many times did you visit the emergency room for your asthma without being admitted to the hospital? 0 0 0   In the past 12 months, how many times were you hospitalized overnight because of your asthma? 0 0 0     Arelis FERNANDEZ, RN, BSN

## 2020-12-11 ENCOUNTER — VIRTUAL VISIT (OUTPATIENT)
Dept: FAMILY MEDICINE | Facility: OTHER | Age: 65
End: 2020-12-11
Payer: COMMERCIAL

## 2020-12-11 PROCEDURE — 99421 OL DIG E/M SVC 5-10 MIN: CPT | Performed by: PHYSICIAN ASSISTANT

## 2020-12-11 ASSESSMENT — ASTHMA QUESTIONNAIRES: ACT_TOTALSCORE: 20

## 2020-12-11 NOTE — PROGRESS NOTES
"Date: 2020 07:25:00  Clinician: Marah Kellogg  Clinician NPI: [id number]  Patient: Fabi Rizzo  Patient : 1955  Patient Address: [address] [place], [place] [address]  Patient Phone: [phone]  Visit Protocol: URI  Patient Summary:  Fabi is a 65 year old ( : 1955 ) female who initiated a [facility] Visit for COVID-19 (Coronavirus) evaluation and screening. When asked the question \"Please sign me up to receive news, health information and promotions from [facility].\", Fabi responded \"Yes\".    When asked when her symptoms started, Fabi reported that she does not have any symptoms.   She denies taking antibiotic medication in the past month and having recent facial or sinus surgery in the past 60 days.    Pertinent COVID-19 (Coronavirus) information  Fabi does not work or volunteer as healthcare worker or a . In the past 14 days, Fabi has not worked or volunteered at a healthcare facility or group living setting.   In the past 14 days, she also has not lived in a congregate living setting.   Fabi has had a close contact with a laboratory-confirmed COVID-19 patient in the last 14 days. She was exposed at her work. Date Fabi was exposed to the laboratory-confirmed COVID-19 patient: 2020   Additional information about contact with COVID-19 (Coronavirus) patient as reported by the patient (free text): I was exposed on 2020 to both a supervisor at work and my family later that day and they have all tested positive   Fabi is not living in the same household with the COVID-19 positive patient. She was in an enclosed space for greater than 15 minutes with the COVID-19 patient.   During the encounter, neither were wearing masks.   Since 2019, Fabi has been tested for COVID-19 and has not had upper respiratory infection or influenza-like illness.      Result of COVID-19 test: Negative     Date of her COVID-19 test: 10/19/2020      " Pertinent medical history  She has not been told by her provider to avoid NSAIDs.   Fabi does not get yeast infections when she takes antibiotics.   Fabi does not have diabetes. She denies having immunosuppressive conditions (e.g., chemotherapy, HIV, organ transplant, long-term use of steroids or other immunosuppressive medications, splenectomy). She does not have severe COPD and congestive heart failure. She does not have asthma.   Fabi needs a return to work/school note.   Weight: 140 lbs   Fabi smokes or uses smokeless tobacco.   Weight: 140 lbs    MEDICATIONS: montelukast oral, allopurinol oral, bupropion HCl oral, lisinopril-hydrochlorothiazide oral, metoprolol tartrate-hydrochlorothiazide oral, ALLERGIES: NKDA  Clinician Response:  Dear Fabi,   Based on your exposure to COVID-19 (coronavirus), we would like to test you for this virus.  1. Please call [phone] to schedule your visit. Explain that you were referred by [facility] to have a COVID-19 test. Be ready to share your [facility] visit ID number.  * If you need to schedule in [facility] please call [phone] or for [employer] employees please call [phone].   * If you need to schedule in the [place] area please call [phone]. [employer] employees call [phone].   The following will serve as your written order for this COVID Test, ordered by me, for the indication of suspected COVID [Z20.828]: The test will be ordered in [facility], our electronic health record, after you are scheduled. It will show as ordered and authorized by Estuardo Cantu MD.  Order: COVID-19 (coronavirus) PCR for ASYMPTOMATIC EXPOSURE testing from [facility].   If you know you have had close contact with someone who tested positive, you should be quarantined for 14 days after this exposure. You should stay in quarantine for the14 days even if the covid test is negative, the optimal time to test after exposure is 5-7 days from the exposure  Quarantine means   What should  I do?  For safety, it's very important to follow these rules. Do this for 14 days after the date you were last exposed to the virus..  Stay home and away from others. Don't go to school or anywhere else. Generally quarantine means staying home from work but there are some exceptions to this. Please contact your workplace.   No hugging, kissing or shaking hands.  Don't let anyone visit.  Cover your mouth and nose with a mask, tissue or washcloth to avoid spreading germs.  Wash your hands and face often. Use soap and water.  What are the symptoms of COVID-19?  The most common symptoms are cough, fever and trouble breathing. Less common symptoms include headache, body aches, fatigue (feeling very tired), chills, sore throat, stuffy or runny nose, diarrhea (loose poop), loss of taste or smell, belly pain, and nausea or vomiting (feeling sick to your stomach or throwing up).  After 14 days, if you have still don't have symptoms, you likely don't have this virus.  If you develop symptoms, follow these guidelines.  If you're normally healthy: Please start another [facility] visit to report your symptoms. Go to [email or web address].  If you have a serious health problem (like cancer, heart failure, an organ transplant or kidney disease): Call your specialty clinic. Let them know that you might have COVID-19.  2. When it's time for your COVID test:  Stay at least 6 feet away from others. (If someone will drive you to your test, stay in the backseat, as far away from the  as you can.)  Cover your mouth and nose with a mask, tissue or washcloth.  Go straight to the testing site. Don't make any stops on the way there or back.  Please note  Caregivers in these groups are at risk for severe illness due to COVID-19:  o People 65 years and older  o People who live in a nursing home or long-term care facility  o People with chronic disease (lung, heart, cancer, diabetes, kidney, liver, immunologic)  o People who have a weakened immune  system, including those who:  Are in cancer treatment  Take medicine that weakens the immune system, such as corticosteroids  Had a bone marrow or organ transplant  Have an immune deficiency  Have poorly controlled HIV or AIDS  Are obese (body mass index of 40 or higher)  Smoke regularly  Where can I get more information?   [employer] [facility] -- About COVID-19: [email or web address]  CDC -- What to Do If You're Sick: www.cdc.gov/coronavirus/2019-ncov/about/steps-when-sick.html  CDC -- Ending Home Isolation: www.cdc.gov/coronavirus/2019-ncov/hcp/disposition-in-home-patients.html  [facility] -- Caring for Someone: www.cdc.gov/coronavirus/2019-ncov/if-you-are-sick/care-for-someone.html  [facility] -- Interim Guidance for Hospital Discharge to Home: www.health.[facility].mn./diseases/coronavirus/hcp/hospdischarge.pdf  [facility] clinical trials (COVID-19 research studies): clinicalaffairs.[facility]-clinical-trials  Below are the COVID-19 hotlines at the [place] Department of Health ([facility]). Interpreters are available.  For health questions: Call 051-292-9294 or 1-861.668.2660 (7 a.m. to 7 p.m.)  For questions about schools and childcare: Call 863-058-3910 or 1-513.914.5127 (7 a.m. to 7 p.m.)    Diagnosis: Contact with and (suspected) exposure to other viral communicable diseases  Diagnosis ICD: Z20.828

## 2020-12-12 DIAGNOSIS — Z20.822 ENCOUNTER FOR LABORATORY TESTING FOR COVID-19 VIRUS: Primary | ICD-10-CM

## 2020-12-12 PROCEDURE — U0003 INFECTIOUS AGENT DETECTION BY NUCLEIC ACID (DNA OR RNA); SEVERE ACUTE RESPIRATORY SYNDROME CORONAVIRUS 2 (SARS-COV-2) (CORONAVIRUS DISEASE [COVID-19]), AMPLIFIED PROBE TECHNIQUE, MAKING USE OF HIGH THROUGHPUT TECHNOLOGIES AS DESCRIBED BY CMS-2020-01-R: HCPCS | Performed by: FAMILY MEDICINE

## 2020-12-13 LAB
LABORATORY COMMENT REPORT: NORMAL
SARS-COV-2 RNA SPEC QL NAA+PROBE: NEGATIVE
SARS-COV-2 RNA SPEC QL NAA+PROBE: NORMAL
SPECIMEN SOURCE: NORMAL
SPECIMEN SOURCE: NORMAL

## 2020-12-14 ENCOUNTER — HEALTH MAINTENANCE LETTER (OUTPATIENT)
Age: 65
End: 2020-12-14

## 2020-12-15 DIAGNOSIS — I10 BENIGN ESSENTIAL HYPERTENSION: Chronic | ICD-10-CM

## 2020-12-15 RX ORDER — METOPROLOL TARTRATE 50 MG
50 TABLET ORAL DAILY
Qty: 90 TABLET | Refills: 0 | Status: SHIPPED | OUTPATIENT
Start: 2020-12-15 | End: 2020-12-16

## 2020-12-15 NOTE — TELEPHONE ENCOUNTER
Received call from [facility] - pt has been out of metoprolol for a couple days and needs refilled. Former [facility]AW pt.    Arelis FERNANDEZ, RN, BSN

## 2020-12-16 DIAGNOSIS — I10 BENIGN ESSENTIAL HYPERTENSION: Chronic | ICD-10-CM

## 2020-12-16 RX ORDER — METOPROLOL TARTRATE 50 MG
50 TABLET ORAL DAILY
Qty: 90 TABLET | Refills: 2 | Status: SHIPPED | OUTPATIENT
Start: 2020-12-16 | End: 2021-11-26

## 2021-04-17 ENCOUNTER — HEALTH MAINTENANCE LETTER (OUTPATIENT)
Age: 66
End: 2021-04-17

## 2021-08-10 ENCOUNTER — APPOINTMENT (OUTPATIENT)
Dept: MRI IMAGING | Facility: CLINIC | Age: 66
End: 2021-08-10
Attending: EMERGENCY MEDICINE
Payer: MEDICARE

## 2021-08-10 ENCOUNTER — HOSPITAL ENCOUNTER (EMERGENCY)
Facility: CLINIC | Age: 66
Discharge: HOME | End: 2021-08-10
Attending: EMERGENCY MEDICINE | Admitting: EMERGENCY MEDICINE
Payer: MEDICARE

## 2021-08-10 VITALS
WEIGHT: 135 LBS | HEART RATE: 81 BPM | TEMPERATURE: 97.5 F | OXYGEN SATURATION: 97 % | RESPIRATION RATE: 16 BRPM | BODY MASS INDEX: 23.17 KG/M2 | DIASTOLIC BLOOD PRESSURE: 97 MMHG | SYSTOLIC BLOOD PRESSURE: 127 MMHG

## 2021-08-10 DIAGNOSIS — R07.9 CHEST PAIN, UNSPECIFIED TYPE: ICD-10-CM

## 2021-08-10 DIAGNOSIS — R20.8 DYSESTHESIA: ICD-10-CM

## 2021-08-10 LAB
ANION GAP SERPL CALCULATED.3IONS-SCNC: 10 MMOL/L (ref 3–14)
BASOPHILS # BLD AUTO: 0.1 10E3/UL (ref 0–0.2)
BASOPHILS NFR BLD AUTO: 1 %
BUN SERPL-MCNC: 23 MG/DL (ref 7–30)
CALCIUM SERPL-MCNC: 9.6 MG/DL (ref 8.5–10.1)
CHLORIDE BLD-SCNC: 107 MMOL/L (ref 94–109)
CO2 SERPL-SCNC: 23 MMOL/L (ref 20–32)
CREAT SERPL-MCNC: 1.1 MG/DL (ref 0.52–1.04)
EOSINOPHIL # BLD AUTO: 0.3 10E3/UL (ref 0–0.7)
EOSINOPHIL NFR BLD AUTO: 5 %
ERYTHROCYTE [DISTWIDTH] IN BLOOD BY AUTOMATED COUNT: 12.8 % (ref 10–15)
GFR SERPL CREATININE-BSD FRML MDRD: 53 ML/MIN/1.73M2
GLUCOSE BLD-MCNC: 76 MG/DL (ref 70–99)
GLUCOSE BLDC GLUCOMTR-MCNC: 78 MG/DL (ref 70–99)
HCT VFR BLD AUTO: 40.6 % (ref 35–47)
HGB BLD-MCNC: 13.6 G/DL (ref 11.7–15.7)
HOLD SPECIMEN: NORMAL
IMM GRANULOCYTES # BLD: 0 10E3/UL
IMM GRANULOCYTES NFR BLD: 0 %
LYMPHOCYTES # BLD AUTO: 2.2 10E3/UL (ref 0.8–5.3)
LYMPHOCYTES NFR BLD AUTO: 40 %
MCH RBC QN AUTO: 35.2 PG (ref 26.5–33)
MCHC RBC AUTO-ENTMCNC: 33.5 G/DL (ref 31.5–36.5)
MCV RBC AUTO: 105 FL (ref 78–100)
MONOCYTES # BLD AUTO: 0.7 10E3/UL (ref 0–1.3)
MONOCYTES NFR BLD AUTO: 12 %
NEUTROPHILS # BLD AUTO: 2.4 10E3/UL (ref 1.6–8.3)
NEUTROPHILS NFR BLD AUTO: 42 %
NRBC # BLD AUTO: 0 10E3/UL
NRBC BLD AUTO-RTO: 0 /100
PLATELET # BLD AUTO: 171 10E3/UL (ref 150–450)
POTASSIUM BLD-SCNC: 4.8 MMOL/L (ref 3.4–5.3)
RBC # BLD AUTO: 3.86 10E6/UL (ref 3.8–5.2)
SODIUM SERPL-SCNC: 140 MMOL/L (ref 133–144)
TROPONIN I SERPL-MCNC: <0.015 UG/L (ref 0–0.04)
WBC # BLD AUTO: 5.6 10E3/UL (ref 4–11)

## 2021-08-10 PROCEDURE — 99285 EMERGENCY DEPT VISIT HI MDM: CPT | Mod: 25 | Performed by: EMERGENCY MEDICINE

## 2021-08-10 PROCEDURE — 84484 ASSAY OF TROPONIN QUANT: CPT | Performed by: EMERGENCY MEDICINE

## 2021-08-10 PROCEDURE — 96360 HYDRATION IV INFUSION INIT: CPT | Mod: 59 | Performed by: EMERGENCY MEDICINE

## 2021-08-10 PROCEDURE — A9585 GADOBUTROL INJECTION: HCPCS | Performed by: EMERGENCY MEDICINE

## 2021-08-10 PROCEDURE — 70544 MR ANGIOGRAPHY HEAD W/O DYE: CPT

## 2021-08-10 PROCEDURE — 258N000003 HC RX IP 258 OP 636: Performed by: EMERGENCY MEDICINE

## 2021-08-10 PROCEDURE — 36415 COLL VENOUS BLD VENIPUNCTURE: CPT | Performed by: EMERGENCY MEDICINE

## 2021-08-10 PROCEDURE — 70549 MR ANGIOGRAPH NECK W/O&W/DYE: CPT

## 2021-08-10 PROCEDURE — 255N000002 HC RX 255 OP 636: Performed by: EMERGENCY MEDICINE

## 2021-08-10 PROCEDURE — 96361 HYDRATE IV INFUSION ADD-ON: CPT | Performed by: EMERGENCY MEDICINE

## 2021-08-10 PROCEDURE — 85025 COMPLETE CBC W/AUTO DIFF WBC: CPT | Performed by: EMERGENCY MEDICINE

## 2021-08-10 PROCEDURE — 80048 BASIC METABOLIC PNL TOTAL CA: CPT | Performed by: EMERGENCY MEDICINE

## 2021-08-10 PROCEDURE — 70553 MRI BRAIN STEM W/O & W/DYE: CPT

## 2021-08-10 PROCEDURE — 93010 ELECTROCARDIOGRAM REPORT: CPT | Performed by: EMERGENCY MEDICINE

## 2021-08-10 PROCEDURE — 93005 ELECTROCARDIOGRAM TRACING: CPT | Performed by: EMERGENCY MEDICINE

## 2021-08-10 RX ORDER — GADOBUTROL 604.72 MG/ML
10 INJECTION INTRAVENOUS ONCE
Status: COMPLETED | OUTPATIENT
Start: 2021-08-10 | End: 2021-08-10

## 2021-08-10 RX ADMIN — SODIUM CHLORIDE 50 ML: 9 INJECTION, SOLUTION INTRAVENOUS at 10:19

## 2021-08-10 RX ADMIN — GADOBUTROL 10 ML: 604.72 INJECTION INTRAVENOUS at 10:19

## 2021-08-10 ASSESSMENT — ENCOUNTER SYMPTOMS
EYE REDNESS: 0
COLOR CHANGE: 0
COUGH: 0
SHORTNESS OF BREATH: 1
HEADACHES: 0
VOMITING: 0
CHILLS: 0
FEVER: 0
MYALGIAS: 0
DYSURIA: 0
DIARRHEA: 0
EYE PAIN: 0
DIAPHORESIS: 1
NUMBNESS: 1
FREQUENCY: 0
NAUSEA: 0
SORE THROAT: 1
ABDOMINAL PAIN: 0

## 2021-08-10 NOTE — ED TRIAGE NOTES
pt here with intermittent episode of facial numbness that started last night at 2000. pt says she keeps feeling hot/cold. Pt reports heart fluttering on/off for the past two weeks. Some SOB. No cardiac hx.  Stroke eval called in triage; discussed with MD.

## 2021-08-10 NOTE — CONSULTS
"      [facility]    Stroke Telephone Note    I was called by John Cordero on 08/10/21 regarding patient Fabi Rizzo. The patient is a 66 year old female who, according to Dr Cordero, had an episode of right body abnormal sensation starting at 7:30 am while brushing her teeth. There was associated shortness of breath, chest pain but no palpitations. The patient had a similar episode last night which subsided spontaneously.     BP (!) 121/92   Pulse 73   Temp 97.5  F (36.4  C) (Tympanic)   Resp 19   Wt 61.2 kg (135 lb)   SpO2 99%   BMI 23.17 kg/m         Imaging Findings   None done yet.     Impression  Recurrent episode of abnormal right chucky-body abnormal sensation associated with shortness of breath and chest pain. Questionable if this is a primary neurologic issue or secondary to cardiac event. Stroke usually cause decreased sensation which is not the case here. Regardless, the neurological deficit is too mild to warrant any acute therapy.       Recommendations   MRI brain with contrast, MRA head without contrast, MRA neck with contrast. After MRI we will reassess.     My recommendations are based on the information provided over the phone by Fabi Rizzo's in-person providers. They are not intended to replace the clinical judgment of her in-person providers. I was not requested to personally see or examine the patient at this time.      Nithin Barth MD, Msc, ROBERT, FAAN   of Neurology  [facility]     08/10/2021 9:52 AM  To page me or covering stroke neurology team member, click here: AMCOM  Choose \"On Call\" tab at top, then search dropdown box for \"Neurology Adult\" & press Enter, look for Neuro ICU/Stroke      "

## 2021-08-10 NOTE — DISCHARGE INSTRUCTIONS
Call [phone] to schedule an appointment to have your cardiac event monitor hooked up.    A referral for neurology has been placed.  You should be contacted for scheduling.  I would like to see you in approximately 6 weeks time.  Will be important that you have your cardiac event monitor completed and resulted prior to your appointment.    Your MRI was reassuring.    As we discussed, I do not know what was causing your symptoms.  Given your reassuring MRI I have very low suspicion for acute stroke versus TIA.  Could be related to a cardiac arrhythmia which would be expected to be identified on the event monitor.    If you develop new or concerning symptoms, return to the emergency department for further evaluation treatment.  Otherwise follow-up with your primary care provider.

## 2021-08-10 NOTE — ED PROVIDER NOTES
"  History     Chief Complaint   Patient presents with     Numbness     pt here with intermittent episode of facial numbness that started last night at 2000. pt says she keeps feeling hot/cold. Pt reports heart fluttering on/off for the past two weeks. No cardiac hx.       Palpitations     HPI  Fabi Rizzo is a 66 year old female with history hypertension, tobacco use, CKD 3, COPD, alcohol use pancreatitis, with multiple complaints including \"not feeling right\" facial numbness\" and chest pain.  Patient ports that last night before going to bed she just was not feeling well.  She is unable to give any further description or details.  Says I just cannot describe it but I did not feel right.  She felt that her whole face was numb and felt warm flushed and slightly diaphoretic.  During this time she also felt short of air.  The symptoms lasted for 20 to 30 minutes and then resolved.  This morning approximately 730 she had another 18 to 20-minute episode which was similar.  She also reports that she has intermittent pain in her right anterior chest.  She has had this previously and was having it this morning as well.  Pain does not radiate.  No known history of coronary artery disease.  Or diabetes.  She does smoke and drink socially.    The patient's PMHx, Surgical Hx, Allergies, and Medications were all reviewed with the patient.    Allergies:  No Known Allergies    Problem List:    Patient Active Problem List    Diagnosis Date Noted     H/O Alcohol-induced Pancreatitis  08/31/2020     Priority: Medium     CKD (chronic kidney disease) stage 3, GFR 30-59 ml/min 08/10/2020     Priority: Medium     Healthcare maintenance 10/24/2019     Priority: Medium     Varicose veins of both lower extremities 10/17/2017     Priority: Medium     Chronic rhinitis 06/15/2016     Priority: Medium     Chronic gout of multiple sites, unspecified cause 06/15/2016     Priority: Medium     Tobacco use disorder 06/15/2016     Priority: " Medium     Spondylolisthesis of lumbar region 09/03/2015     Priority: Medium     Lumbar foraminal stenosis 09/03/2015     Priority: Medium     COPD (chronic obstructive pulmonary disease) (H)- mild 05/06/2013     Priority: Medium     Alcohol use 11/16/2012     Priority: Medium     Chronic low back pain 11/16/2012     Priority: Medium     Pt referred to pain clinic.   Has been asking for pain meds for pain at different sites over the last year. Needs to see pain specialist.  Also, of note, drinks alcohol almost on a daily basis.   Poor compliance with appointments.       GERD (gastroesophageal reflux disease) 04/20/2011     Priority: Medium     Major depressive disorder, recurrent episode, mild (HCC) 04/20/2011     Priority: Medium     Benign essential hypertension 12/21/2010     Priority: Medium     CARDIOVASCULAR SCREENING; LDL GOAL LESS THAN 130 10/31/2010     Priority: Medium        Past Medical History:    No past medical history on file.    Past Surgical History:    Past Surgical History:   Procedure Laterality Date     LAMINECTOMY LUMBAR POSTERIOR MICROSCOPIC ONE LEVEL N/A 11/17/2015    Procedure: LAMINECTOMY LUMBAR POSTERIOR MICROSCOPIC ONE LEVEL;  Surgeon: Stephen Vuong MD;  Location: [facility]     SURGICAL HISTORY OF -   09/30/2009    OPEN REDUCTION, INTERNAL FIXATINO OF RIGHT ANKLE (MEDIAL AND LATERL MALLEOLI)       Family History:    Family History   Problem Relation Age of Onset     C.A.D. Mother      C.A.D. Father      Other Cancer Brother         leukemia from agent orange      Diabetes Brother      Hypertension Brother        Social History:  Marital Status:   [2]  Social History     Tobacco Use     Smoking status: Current Every Day Smoker     Packs/day: 0.25     Years: 30.00     Pack years: 7.50     Types: Cigarettes     Smokeless tobacco: Never Used   Substance Use Topics     Alcohol use: Yes     Drug use: No        Medications:    albuterol (VENTOLIN HFA) 108 (90 Base) MCG/ACT  inhaler  allopurinol (ZYLOPRIM) 100 MG tablet  aspirin (ASA) 81 MG tablet  buPROPion (WELLBUTRIN SR) 150 MG 12 hr tablet  CALCIUM 600 MG PO TABS  fish oil-omega-3 fatty acids 1000 MG capsule  fluticasone-salmeterol (AIRDUO RESPICLICK) 113-14 MCG/ACT inhaler  lisinopril (ZESTRIL) 2.5 MG tablet  metoprolol tartrate (LOPRESSOR) 50 MG tablet  montelukast (SINGULAIR) 10 MG tablet  MULTIVITAMINS PO TABS  ORDER FOR DME          Review of Systems   Constitutional: Positive for diaphoresis. Negative for chills and fever.   HENT: Positive for sore throat.    Eyes: Negative for pain and redness.   Respiratory: Positive for shortness of breath. Negative for cough.    Cardiovascular: Positive for chest pain.   Gastrointestinal: Negative for abdominal pain, diarrhea, nausea and vomiting.   Genitourinary: Negative for dysuria, frequency and urgency.   Musculoskeletal: Negative for myalgias.   Skin: Negative for color change.   Neurological: Positive for numbness. Negative for headaches.       Physical Exam   BP: 109/73  Pulse: 77  Temp: 97.5  F (36.4  C)  Resp: 20  Weight: 61.2 kg (135 lb)  SpO2: 98 %    Physical Exam  General: Awake, alert, and cooperative. No acute distress  Mental Status: Oriented x 3. Speech normal.  Head: Normocephalic, atraumatic, symmetric.   Eyes: Conjunctiva normal, no discharge, PERRL 3 mm. No neglect/hemianopsia. No nystagmus.   Ears, Nose, Throat: External ears and nares normal.  No oral exudates. Oral mucosa moist.  Neck: Supple. No adenopathy. Non-tender.   Cardiovascular: Regular rate. Regular rhythm. No murmurs. Peripheral pulses strong. Normal capillary refill. No LE edema.   Respiratory: Normal effort. No wheezes, rales, or rhonchi bilaterally.  Chest Wall: Equal rise.  Abdomen: No tenderess, soft, non-distended. No rebound or guarding. No masses palpated  Back: Atraumatic.   Genitourinary: Normal external genitalia  Musculoskeletal: No gross deformity. Warm and well perfused  Neurologic: Mental  status: Alert, awake. Oriented to self, date, and place. Normal speech and language. GCS 15  Cranial Nerves: II-XII intact  Motor: Follows commands x 4 extremities. Down going Babinski's. No Clonus.   Upper Extremities:   RUE: 5/5 shoulder abduction. 5/5 elbow flex/ext. 5/5 wrist flex/ext. 5/5 hand .   LUE: 5/5 shoulder abduction. 5/5 elbow flex/ext. 5/5 wrist flex/ext. 5/5 hand .   Lower Extremities:   RLE: 5/5 hip flexion. 5/5 knee flex/ext. 5/5 ankle plantar-/dorsiflexion. 5/5 EHL.   LLE: 5/5 hip flexion. 5/5 knee flex/ext. 5/5 ankle plantar-/dorsiflexion. 5/5 EHL   Sensory: patient reports reduced sensation in right upper and lower extremity as well as in V2 distribution on right side of face.   Coordination: Finger-nose finger intact. Heel-shin intact.   Skin: Warm, dry, no pallor, no erythema, no jaundice or rash.  Psychiatric:  Appropriate affect. Behavior is normal.    ED Course     ED Course as of Aug 10 1452   Tue Aug 10, 2021   0949 I reviewed the case with Dr. Lee with stroke neurology.  No acute intervention recommended.  Recommends MR brain as well as MRA of head and neck.      1201 Reviewed imaging with Dr. Barth. Thinks likely a TIA/stroke mimic. No acute intervention. Recommends outpatient 30 day cardiac monitoring. Follow up in general neurology clinic in 6 weeks.         Procedures          EKG.  Interpreted by myself.  Leftward axis.  Sinus rhythm.  69 bpm.  No ectopy.  Poor R wave progression.  No ST elevations or depressions.  Normal intervals.  No significant changes compared to 3/9/2012.    Critical Care time:  none         The patient has stroke symptoms:         ED Stroke specific documentation           NIHSS PDF     Patient last known well time: 0730  ED Provider first to bedside at: 0900  CT Results received at: N/A    Thrombolytics:   Not given due to minor/isolated/quickly resolving symptoms.    If treating with thrombolytics: Ensure SBP<180 and DBP<105 prior to treatment  with thrombolytics.  Administering thrombolytics after treatment with IV labetalol, hydralazine, or nicardipine is reasonable once BP control is established.    Endovascular Retrieval:  Not initiated due to absence of proximal vessel occlusion    National [employer] Stroke Scale (Baseline)  Time Performed: 0900      Score    Level of consciousness: (0)   Alert, keenly responsive    LOC questions: (0)   Answers both questions correctly    LOC commands: (0)   Performs both tasks correctly    Best gaze: (0)   Normal    Visual: (0)   No visual loss    Facial palsy: (0)   Normal symmetrical movements    Motor arm (left): (0)   No drift    Motor arm (right): (0)   No drift    Motor leg (left): (0)   No drift    Motor leg (right): (0)   No drift    Limb ataxia: (0)   Absent    Sensory: (1)   Mild to moderate sensory loss    Best language: (0)   Normal- no aphasia    Dysarthria: (0)   Normal    Extinction and inattention: (0)   No abnormality        Total Score:  1        Stroke Mimics were considered (including migraine headache, seizure disorder, hypoglycemia (or hyperglycemia), head or spinal trauma, CNS infection, Toxin ingestion and shock state (e.g. sepsis) .                     Results for orders placed or performed during the hospital encounter of 08/10/21 (from the past 24 hour(s))   [place] Draw    Narrative    The following orders were created for panel order [place] Draw.  Procedure                               Abnormality         Status                     ---------                               -----------         ------                     Extra Blue Top Tube[[id number]]                              Final result               Extra Red Top Tube[[id number]]                                                          Extra Green Top (Lithium...[[id number]]                      Final result               Extra Purple Top Tube[[id number]]                            Final result                 Please view  results for these tests on the individual orders.   Extra Blue Top Tube   Result Value Ref Range    Hold Specimen JIC    Extra Green Top (Lithium Heparin) Tube   Result Value Ref Range    Hold Specimen JIC    Extra Purple Top Tube   Result Value Ref Range    Hold Specimen JIC    CBC with Platelets & Differential    Narrative    The following orders were created for panel order CBC with Platelets & Differential.  Procedure                               Abnormality         Status                     ---------                               -----------         ------                     CBC with platelets and d...[[id number]]  Abnormal            Final result                 Please view results for these tests on the individual orders.   Basic metabolic panel   Result Value Ref Range    Sodium 140 133 - 144 mmol/L    Potassium 4.8 3.4 - 5.3 mmol/L    Chloride 107 94 - 109 mmol/L    Carbon Dioxide (CO2) 23 20 - 32 mmol/L    Anion Gap 10 3 - 14 mmol/L    Urea Nitrogen 23 7 - 30 mg/dL    Creatinine 1.10 (H) 0.52 - 1.04 mg/dL    Calcium 9.6 8.5 - 10.1 mg/dL    Glucose 76 70 - 99 mg/dL    GFR Estimate 53 (L) >60 mL/min/1.73m2   Troponin I   Result Value Ref Range    Troponin I <0.015 0.000 - 0.045 ug/L   CBC with platelets and differential   Result Value Ref Range    WBC Count 5.6 4.0 - 11.0 10e3/uL    RBC Count 3.86 3.80 - 5.20 10e6/uL    Hemoglobin 13.6 11.7 - 15.7 g/dL    Hematocrit 40.6 35.0 - 47.0 %     (H) 78 - 100 fL    MCH 35.2 (H) 26.5 - 33.0 pg    MCHC 33.5 31.5 - 36.5 g/dL    RDW 12.8 10.0 - 15.0 %    Platelet Count 171 150 - 450 10e3/uL    % Neutrophils 42 %    % Lymphocytes 40 %    % Monocytes 12 %    % Eosinophils 5 %    % Basophils 1 %    % Immature Granulocytes 0 %    NRBCs per 100 WBC 0 <1 /100    Absolute Neutrophils 2.4 1.6 - 8.3 10e3/uL    Absolute Lymphocytes 2.2 0.8 - 5.3 10e3/uL    Absolute Monocytes 0.7 0.0 - 1.3 10e3/uL    Absolute Eosinophils 0.3 0.0 - 0.7 10e3/uL    Absolute Basophils 0.1 0.0  - 0.2 10e3/uL    Absolute Immature Granulocytes 0.0 <=0.0 10e3/uL    Absolute NRBCs 0.0 10e3/uL   Glucose by meter   Result Value Ref Range    GLUCOSE BY METER POCT 78 70 - 99 mg/dL   MRA Brain ([place]) wo Contrast    Narrative    MR ANGIOGRAM OF THE HEAD WITHOUT CONTRAST   8/10/2021 10:37 AM     HISTORY: Neuro deficit, acute, stroke suspected; Decreased sensation  right side of body.    TECHNIQUE:  3D time-of-flight MR angiogram of the head without  contrast.    COMPARISON: None.    FINDINGS: Mild motion artifact. The vertebral arteries, basilar  artery, and posterior cerebral arteries are patent. The internal  carotid arteries, anterior cerebral arteries, and middle cerebral  arteries are patent. No evidence of large vessel occlusion or  high-grade stenosis. No evidence of aneurysm or vascular malformation.      Impression    IMPRESSION: No evidence of large vessel occlusion or high-grade  stenosis.    NALLELY BUTLER MD         SYSTEM ID:  [id number]   MR Brain w/o & w Contrast    Narrative    MRI BRAIN WITHOUT AND WITH CONTRAST August 10, 2021 10:53 AM    HISTORY: Neuro deficit, acute, stroke suspected. Decreased sensation  on right side of body.     TECHNIQUE: Multiplanar, multisequence MRI of the brain without and  with 10 mL Gadavist.    COMPARISON: None.    FINDINGS: No evidence of acute ischemia or hemorrhage.  Mild-to-moderate volume loss is present. Scattered white matter T2  hyperintensities likely represent mild-to-moderate chronic small  vessel ischemic change. No abnormal enhancement or diffusion  restriction. Probable incidental small developmental venous anomaly  within the right frontal pole.    Marrow signal is within normal limits. Bilateral mastoid cavity  opacification, presumably inflammatory. Mild paranasal sinus mucosal  thickening. Presumed nasopharyngeal submucosal cysts. Bilateral lens  replacements.      Impression    IMPRESSION:  1. No evidence of acute ischemia or  hemorrhage.  2. Volume loss and white matter T2 hyperintensities which likely  represent chronic small vessel ischemic change.    NALLELY BUTLER MD         SYSTEM ID:  [id number]   MRA Neck (Carotids) wo & w Contrast    Narrative    MRA NECK WITHOUT AND WITH CONTRAST August 10, 2021 10:54 AM     HISTORY: Neuro deficit, acute, stroke suspected. Decrease sensation  right side of body.    TECHNIQUE: 2D time-of-flight MR angiogram of the neck without contrast  and 3D MR angiogram of the neck with  10 mL Gadavist. Estimates of  carotid stenoses are made relative to the distal internal carotid  artery diameters except as noted.    COMPARISON: None.    FINDINGS: The bilateral common carotid, internal carotid, external  carotid, and vertebral arteries are patent. Mild plaque at the  bilateral carotid bifurcations. No evidence of large vessel occlusion  or high-grade stenosis. Presumed artifactual signal loss at the  proximal right common carotid artery. No evidence of dissection. Poor  visualization of the bilateral vertebral artery origins, likely  artifactual/technical.      Impression    IMPRESSION: No evidence of large vessel occlusion, high-grade  stenosis, or dissection.    NALLELY BUTLER MD         SYSTEM ID:  [id number]       Medications   gadobutrol (GADAVIST) injection 10 mL (10 mLs Intravenous Given 8/10/21 1019)   0.9% sodium chloride BOLUS (0 mLs Intravenous Stopped 8/10/21 1308)       Assessments & Plan (with Medical Decision Making)   66 year old female with past medical history of hypertension, CKD three, COPD, alcohol induced pancreatitis, tobacco use, with intermittent facial numbness, feeling generally unwell, and episodic right anterior chest discomfort which is not clearly related to above.  Based on complaint of bilateral facial numbness with no other localizing symptoms routine evaluation pursued.  Patient did not have loss of sensation and no focal motor weakness.  She had subjective decreased  sensation on entirety of the right side of her body.  NIH stroke scale of one.  I discussed the case with Dr. Lee with stroke neurology who recommended skipping over CT scan and pursuing MR/MRA.  No indication for lytics.  I have very low suspicion for large vessel occlusion requiring mechanical thrombectomy based on her examination.  Patient does not meet criteria for tier 1 or tier 2 code stroke.    MR MRI without acute findings to explain her symptoms.  No acute intervention from stroke neurology standpoint.  They did recommend follow-up in general neurology clinic in 6 weeks and 30-day cardiac event monitor.  Patient's ECG did not show any signs of acute ischemia and cardiac troponin was below level of detection.  Chest  pain in her right anterior chest is not exertional and does not appear to be anginal in nature.  I have very low suspicion for ACS as underlying etiology.  However given the transient nature of her symptoms generally feeling unwell pain could be related to an intermittent arrhythmia such as atrial fibrillation/flutter.  Having the cardiac event monitor is a good idea.  Patient's lab work was generally unrevealing.  CBC and BMP were grossly normal.    She remained hemodynamically stable in the emergency department and had improvement of her symptoms.  No chest pain.  Given her reassuring evaluation I feel she is appropriate for outpatient management.  Neurology referral placed in [facility] and outpatient order for 30-day cardiac event monitor also placed.  Follow-up and ED return precautions significance patient.  She addressed agreement understanding of plan and discharged in stable condition.        I have reviewed the nursing notes.         Discharge Medication List as of 8/10/2021  1:09 PM          Final diagnoses:   Dysesthesia - right side of body   Chest pain, unspecified type     John Brown MD    8/10/2021   [facility] EMERGENCY DEPT    Disclaimer: This note  consists of words and symbols derived from keyboarding and dictation using voice recognition software.  As a result, there may be errors that have gone undetected.  Please consider this when interpreting information found in this note.             John Brown MD  08/10/21 9822

## 2021-08-12 ENCOUNTER — HOSPITAL ENCOUNTER (OUTPATIENT)
Dept: CARDIOLOGY | Facility: CLINIC | Age: 66
Discharge: HOME | End: 2021-08-12
Attending: EMERGENCY MEDICINE | Admitting: EMERGENCY MEDICINE
Payer: MEDICARE

## 2021-08-12 DIAGNOSIS — R07.9 CHEST PAIN, UNSPECIFIED TYPE: ICD-10-CM

## 2021-08-12 DIAGNOSIS — R20.8 DYSESTHESIA: ICD-10-CM

## 2021-08-12 PROCEDURE — 93270 REMOTE 30 DAY ECG REV/REPORT: CPT

## 2021-08-12 PROCEDURE — 93272 ECG/REVIEW INTERPRET ONLY: CPT | Performed by: INTERNAL MEDICINE

## 2021-10-02 ENCOUNTER — HEALTH MAINTENANCE LETTER (OUTPATIENT)
Age: 66
End: 2021-10-02

## 2021-11-25 ENCOUNTER — TELEPHONE (OUTPATIENT)
Dept: FAMILY MEDICINE | Facility: CLINIC | Age: 66
End: 2021-11-25
Payer: MEDICARE

## 2021-11-25 DIAGNOSIS — I10 BENIGN ESSENTIAL HYPERTENSION: Chronic | ICD-10-CM

## 2021-11-26 RX ORDER — METOPROLOL TARTRATE 50 MG
50 TABLET ORAL DAILY
Qty: 14 TABLET | Refills: 0 | Status: SHIPPED | OUTPATIENT
Start: 2021-11-26

## 2021-11-26 NOTE — TELEPHONE ENCOUNTER
Routing refill request to provider for review/approval because:  Last recorded blood pressure does not meet RN protocol parameters    Obi Zafar RN

## 2021-12-01 NOTE — TELEPHONE ENCOUNTER
Metoprolol and Lopressor refills need apt.   LM on identified voice mail that appointment is needed

## 2022-05-08 ENCOUNTER — HEALTH MAINTENANCE LETTER (OUTPATIENT)
Age: 67
End: 2022-05-08

## 2023-01-14 ENCOUNTER — HEALTH MAINTENANCE LETTER (OUTPATIENT)
Age: 68
End: 2023-01-14

## 2023-06-11 NOTE — TELEPHONE ENCOUNTER
"Requested Prescriptions   Pending Prescriptions Disp Refills     montelukast (SINGULAIR) 10 MG tablet [Pharmacy Med Name: MONTELUKAST SODIUM 10 MG TABLET] 90 tablet 0     Sig: Take 1 tablet (10 mg) by mouth At Bedtime       Leukotriene Inhibitors Protocol Failed - 6/24/2019  5:44 PM        Failed - Asthma control assessment score within normal limits in last 6 months     Please review ACT score.           Failed - Recent (6 mo) or future (30 days) visit within the authorizing provider's specialty     Patient had office visit in the last 6 months or has a visit in the next 30 days with authorizing provider or within the authorizing provider's specialty.  See \"Patient Info\" tab in inbasket, or \"Choose Columns\" in Meds & Orders section of the refill encounter.            Passed - Patient is age 12 or older     If patient is under 16, ok to refill using age based dosing.           Passed - Medication is active on med list        montelukast (SINGULAIR) 10 MG tablet  Last Written Prescription Date:  03/27/2019  Last Fill Quantity: 90 tablet,  # refills: 0   Last office visit: 11/21/2018 with prescribing provider:  CLARISSE Hill   Future Office Visit:      ACT Total Scores 5/5/2017 5/5/2017 2/16/2018   ACT TOTAL SCORE - - -   ASTHMA ER VISITS - - -   ASTHMA HOSPITALIZATIONS - - -   ACT TOTAL SCORE (Goal Greater than or Equal to 20) 18 18 15   In the past 12 months, how many times did you visit the emergency room for your asthma without being admitted to the hospital? 0 0 0   In the past 12 months, how many times were you hospitalized overnight because of your asthma? 0 0 0     Sepideh ALEXANDER (R) (M)    "
Refilled with attached appt reminder message.  FRANCHESCA Hicks RN    
SIGUE A TU MÉDICO EN 1-2 DÍAS. REGRESE A LA ER PARA CUALQUIER SÍNTOMA PENDIENTE O NUEVAS PREOCUPACIONES.     Sindrome viral en niños    LO QUE NECESITA SABER:    ¿Qué es el síndrome viral?El síndrome viral es un término usado para los síntomas de melly infección causada por un virus. Los virus se propagan fácilmente de melly persona a otra mediante los objetos que se comparten.    ¿Cuáles son los signos y síntomas del síndrome viral?Los signos y síntomas podrían empezar de manera lenta o repentina y durar desde horas hasta josse. Pueden ser de leves a graves y pueden cambiar en un periodo de días u horas. Rothman hijo podría tener cualquiera de los siguientes:    Fiebre y escalofríos    Congestión o goteo nasal    Tos, dolor de garganta y voz ronca    Dolor de agustin, o dolor y presión alrededor de los ojos    Dolor muscular y de las articulaciones    Falta de aliento o sibilancia    Dolor abdominal, calambres y diarrea    Náuseas, vómitos o pérdida del apetito  ¿Cómo se diagnostica y trata el síndrome viral?El médico de rothman renee le hará preguntas acerca de los síntomas de rothman renee y lo examinará. Para melly infección viral, no se administran antibióticos. El pediatra le puede recomendar los siguientes:    Acetaminofénalivia el dolor y baja la fiebre. Está disponible sin receta médica. Pregunte qué cantidad debe darle a rothman renee y con qué frecuencia. Siga las indicaciones. Jaison las etiquetas de todos los demás medicamentos que esté tomando rothman hijo para saber si también contienen acetaminofén, o pregunte a rothman médico o farmacéutico. El acetaminofén puede causar daño en el hígado cuando no se aaron de forma correcta.    AINEcomo el ibuprofeno, ayudan a disminuir la inflamación, el dolor y la fiebre. Elizabeth medicamento está disponible con o sin melly receta médica. Los MARIELLE pueden causar sangrado estomacal o problemas renales en ciertas personas. Si rothman renee está tomando un anticoagulante, siempre pregunte si los MARIELLE son seguros para él. Siempre jaison la etiqueta de elizabeth medicamento y siga las instrucciones. No administre elizabeth medicamento a niños menores de 6 meses de federico sin antes obtener la autorización del médico.    El rociador nasal de agua salinatal vez ayude a aliviar la congestión de los senos paranasales de rothman hijo.  ¿Cómo mino cuidar de mi renee?    Caleb a rothman renee suficientes líquidos para evitar la deshidratación.Los ejemplos incluyen agua, paletas de hielo, gelatina con sabor y caldo. Pregunte cuánto líquido debe verito el renee a diario y qué líquidos le recomiendan. Es posible que deba administrarle al renee melly solución oral con electrolitos si está vomitando o tiene diarrea. No le dé a rothman renee líquidos que contienen cafeína. La cafeína puede empeorar la deshidratación.    Pídale a rothman renee que repose.Anime a rothman hijo a que tome siestas sam el día. El descanso podría ayudar a que rothman renee se sienta mejor más rápido.    Use un humidificador de vapor fríopara aumentar el nivel de humedad en el aire de rothman hogar. [place] podría facilitar que rothman renee respire y ayudarlo a disminuir rothman tos.    Aplique gotas underwood en la narizdel bebé si tiene congestión nasal. Ponga unas cuantas gotas en cada fosa nasal. Introduzca suavemente melly feroz de succión para remover la mucosidad.  Uso apropiado de la jeringa de bulbo      Revise la temperatura de rothman renee atiya se le indique.[place] le ayudará a vigilar la condición de rothman renee. Pregunte al pediatra con qué frecuencia debe revisar la temperatura del renee.  Cómo verito la temperatura en niños  ¿Qué puedo hacer para prevenir la propagación de los gérmenes?    Indique a rothman hijo que se lave las kapil con frecuenciacon jabón y agua. Recuérdele a rothman hijo que se frote las kapil enjabonadas, enlazando los dedos, sam al menos 20 segundos. Pau que rothman hijo se enjuague con agua corriente caliente Ayude a rothman hijo a secarse las kapil con melly toalla limpia o melly toalla de papel. Recuérdele a rothman hijo que use un desinfectante de kapil que contenga alcohol si no hay agua y jabón disponibles.  Lavado de kapil      Recuérdele a rothman hijo que se cubra al toser o estornudar.Muéstrele a rothman hijo cómo usar un pañuelo para cubrirse la boca y la nariz. Pau que arroje el pañuelo a la basura de inmediato. Recuérdele a rothman hijo que tosa o estornude en el pliegue del codo si es posible. Luego pau que rothman hijo se lave reno las kapil con agua y jabón o use un desinfectante de kapil.    Mantenga a rothman renee en casa mientras esté enfermo.[place] es especialmente importante sam los primeros 3 a 5 días de enfermedad. El virus es más contagioso sam elizabeth tiempo.    Recuérdele a rothman hijo que no comparta artículos.Por ejemplo, juguetes, bebidas y comida.    Pregunte acerca de las vacunas que rothman renee necesita.Las vacunas ayudan a prevenir algunas infecciones que causan enfermedades. Pau que rothman hijo se aplique melly vacuna anual contra la gripe tan pronto atiya se recomiende, normalmente en septiembre u octubre. El médico de rothman renee puede indicarle qué otras vacunas debería recibir rothman hijo, y cuándo debe recibirlas.  Calendario de vacunación recomendado para 2022      Llame al número de emergencias local (911 en los [place]) si:    Rothman hijo sufre melly convulsión.    El renee tiene dificultad para respirar o está respirando muy rápido.    Los labios, la lengua o las uñas de rothman renee se ponen azules.    No es posible despertar a rothman hijo.  ¿Cuándo mino buscar atención inmediata?    Rothman hijo se queja de rigidez en el jaime y mucho dolor de agustin.    Rothman hijo tiene la boca reseca, los labios partidos, llora sin lágrimas o está mareado.    La parte blanda de la agustin del renee está hundida o abultada.    Rothman hijo tose sanjay o melly mucosidad espesa de color amarilla o irma.    Rothman hijo está muy débil o confundido.    Rothman hijo memo de orinar u orina mucho menos de lo habitual.    El renee tiene dolor abdominal intenso o rothman abdomen está más mack de lo habitual.  ¿Cuándo mino llamar al médico de mi hijo?    Rothman hijo tiene fiebre por más de 3 días.    Los síntomas de rothman renee no mejoran con el tratamiento.    El renee tiene poco apetito o está desnutrido.    Tiene sarpullido, dolor de oído o garganta irritada.    Siente dolor al orinar.    Está irritable e inquieto y no lo puede calmar.    ted tiene preguntas o inquietudes sobre la condición o el cuidado de rothman hijo.  ACUERDOS SOBRE ROTHMAN CUIDADO:    Sridhar tiene el derecho de participar en la planificación del cuidado de rothman hijo. Infórmese sobre la condición de randa de rothman renee y cómo puede ser tratada. Discuta las opciones de tratamiento con los médicos de rothman renee para decidir el cuidado que sridhar desea para él.    © [employer] 1973, 2023

## 2024-02-17 ENCOUNTER — HEALTH MAINTENANCE LETTER (OUTPATIENT)
Age: 69
End: 2024-02-17